# Patient Record
Sex: MALE | Race: WHITE | NOT HISPANIC OR LATINO | URBAN - METROPOLITAN AREA
[De-identification: names, ages, dates, MRNs, and addresses within clinical notes are randomized per-mention and may not be internally consistent; named-entity substitution may affect disease eponyms.]

---

## 2017-10-13 ENCOUNTER — EMERGENCY (EMERGENCY)
Facility: HOSPITAL | Age: 82
LOS: 0 days | Discharge: HOME | End: 2017-10-13
Attending: EMERGENCY MEDICINE | Admitting: EMERGENCY MEDICINE
Payer: MEDICARE

## 2017-10-13 VITALS
DIASTOLIC BLOOD PRESSURE: 62 MMHG | HEART RATE: 89 BPM | OXYGEN SATURATION: 99 % | RESPIRATION RATE: 17 BRPM | SYSTOLIC BLOOD PRESSURE: 171 MMHG | TEMPERATURE: 98 F

## 2017-10-13 VITALS — HEIGHT: 71 IN | WEIGHT: 160.06 LBS

## 2017-10-13 DIAGNOSIS — I10 ESSENTIAL (PRIMARY) HYPERTENSION: ICD-10-CM

## 2017-10-13 DIAGNOSIS — M54.9 DORSALGIA, UNSPECIFIED: ICD-10-CM

## 2017-10-13 DIAGNOSIS — E03.9 HYPOTHYROIDISM, UNSPECIFIED: ICD-10-CM

## 2017-10-13 LAB
ALBUMIN SERPL ELPH-MCNC: 4.1 G/DL — SIGNIFICANT CHANGE UP (ref 3.3–5)
ALP SERPL-CCNC: 52 U/L — SIGNIFICANT CHANGE UP (ref 40–120)
ALT FLD-CCNC: 32 U/L — SIGNIFICANT CHANGE UP (ref 12–78)
ANION GAP SERPL CALC-SCNC: 8 MMOL/L — SIGNIFICANT CHANGE UP (ref 5–17)
ANISOCYTOSIS BLD QL: SLIGHT — SIGNIFICANT CHANGE UP
APTT BLD: 29.4 SEC — SIGNIFICANT CHANGE UP (ref 27.5–37.4)
AST SERPL-CCNC: 34 U/L — SIGNIFICANT CHANGE UP (ref 15–37)
BASO STIPL BLD QL SMEAR: SLIGHT — SIGNIFICANT CHANGE UP
BASOPHILS # BLD AUTO: 0 K/UL — SIGNIFICANT CHANGE UP (ref 0–0.2)
BASOPHILS NFR BLD AUTO: 0.4 % — SIGNIFICANT CHANGE UP (ref 0–2)
BILIRUB SERPL-MCNC: 2.3 MG/DL — HIGH (ref 0.2–1.2)
BUN SERPL-MCNC: 18 MG/DL — SIGNIFICANT CHANGE UP (ref 7–23)
CALCIUM SERPL-MCNC: 9.5 MG/DL — SIGNIFICANT CHANGE UP (ref 8.5–10.1)
CHLORIDE SERPL-SCNC: 102 MMOL/L — SIGNIFICANT CHANGE UP (ref 96–108)
CO2 SERPL-SCNC: 28 MMOL/L — SIGNIFICANT CHANGE UP (ref 22–31)
CREAT SERPL-MCNC: 1.09 MG/DL — SIGNIFICANT CHANGE UP (ref 0.5–1.3)
DACRYOCYTES BLD QL SMEAR: SLIGHT — SIGNIFICANT CHANGE UP
ELLIPTOCYTES BLD QL SMEAR: SLIGHT — SIGNIFICANT CHANGE UP
EOSINOPHIL # BLD AUTO: 0 K/UL — SIGNIFICANT CHANGE UP (ref 0–0.5)
EOSINOPHIL NFR BLD AUTO: 0.4 % — SIGNIFICANT CHANGE UP (ref 0–6)
GLUCOSE SERPL-MCNC: 107 MG/DL — HIGH (ref 70–99)
HCT VFR BLD CALC: 32.2 % — LOW (ref 39–50)
HGB BLD-MCNC: 10.1 G/DL — LOW (ref 13–17)
INR BLD: 1.07 RATIO — SIGNIFICANT CHANGE UP (ref 0.88–1.16)
LYMPHOCYTES # BLD AUTO: 0.9 K/UL — LOW (ref 1–3.3)
LYMPHOCYTES # BLD AUTO: 37.2 % — SIGNIFICANT CHANGE UP (ref 13–44)
MANUAL DIF COMMENT BLD-IMP: SIGNIFICANT CHANGE UP
MCHC RBC-ENTMCNC: 26.1 PG — LOW (ref 27–34)
MCHC RBC-ENTMCNC: 31.3 GM/DL — LOW (ref 32–36)
MCV RBC AUTO: 83.6 FL — SIGNIFICANT CHANGE UP (ref 80–100)
MONOCYTES # BLD AUTO: 0 K/UL — SIGNIFICANT CHANGE UP (ref 0–0.9)
MONOCYTES NFR BLD AUTO: 1.9 % — LOW (ref 2–14)
NEUTROPHILS # BLD AUTO: 1.5 K/UL — LOW (ref 1.8–7.4)
NEUTROPHILS NFR BLD AUTO: 60 % — SIGNIFICANT CHANGE UP (ref 43–77)
PLAT MORPH BLD: NORMAL — SIGNIFICANT CHANGE UP
PLATELET # BLD AUTO: 89 K/UL — LOW (ref 150–400)
POIKILOCYTOSIS BLD QL AUTO: SLIGHT — SIGNIFICANT CHANGE UP
POLYCHROMASIA BLD QL SMEAR: SLIGHT — SIGNIFICANT CHANGE UP
POTASSIUM SERPL-MCNC: 4.1 MMOL/L — SIGNIFICANT CHANGE UP (ref 3.5–5.3)
POTASSIUM SERPL-SCNC: 4.1 MMOL/L — SIGNIFICANT CHANGE UP (ref 3.5–5.3)
PROT SERPL-MCNC: 7.9 GM/DL — SIGNIFICANT CHANGE UP (ref 6–8.3)
PROTHROM AB SERPL-ACNC: 11.6 SEC — SIGNIFICANT CHANGE UP (ref 9.8–12.7)
RBC # BLD: 3.86 M/UL — LOW (ref 4.2–5.8)
RBC # FLD: 16.4 % — HIGH (ref 10.3–14.5)
RBC BLD AUTO: (no result)
SCHISTOCYTES BLD QL AUTO: SLIGHT — SIGNIFICANT CHANGE UP
SODIUM SERPL-SCNC: 138 MMOL/L — SIGNIFICANT CHANGE UP (ref 135–145)
TARGETS BLD QL SMEAR: SLIGHT — SIGNIFICANT CHANGE UP
WBC # BLD: 2.4 K/UL — LOW (ref 3.8–10.5)
WBC # FLD AUTO: 2.4 K/UL — LOW (ref 3.8–10.5)

## 2017-10-13 PROCEDURE — 99284 EMERGENCY DEPT VISIT MOD MDM: CPT

## 2017-10-13 PROCEDURE — 73502 X-RAY EXAM HIP UNI 2-3 VIEWS: CPT | Mod: 26

## 2017-10-13 PROCEDURE — 71010: CPT | Mod: 26

## 2017-10-13 PROCEDURE — 72131 CT LUMBAR SPINE W/O DYE: CPT | Mod: 26

## 2017-10-13 RX ORDER — ONDANSETRON 8 MG/1
4 TABLET, FILM COATED ORAL ONCE
Qty: 0 | Refills: 0 | Status: COMPLETED | OUTPATIENT
Start: 2017-10-13 | End: 2017-10-13

## 2017-10-13 RX ORDER — TRAMADOL HYDROCHLORIDE 50 MG/1
1 TABLET ORAL
Qty: 12 | Refills: 0 | OUTPATIENT
Start: 2017-10-13

## 2017-10-13 RX ORDER — OXYCODONE AND ACETAMINOPHEN 5; 325 MG/1; MG/1
1 TABLET ORAL EVERY 4 HOURS
Qty: 0 | Refills: 0 | Status: DISCONTINUED | OUTPATIENT
Start: 2017-10-13 | End: 2017-10-13

## 2017-10-13 RX ADMIN — ONDANSETRON 4 MILLIGRAM(S): 8 TABLET, FILM COATED ORAL at 13:48

## 2017-10-13 RX ADMIN — OXYCODONE AND ACETAMINOPHEN 1 TABLET(S): 5; 325 TABLET ORAL at 13:47

## 2017-10-13 NOTE — ED PROVIDER NOTE - MEDICAL DECISION MAKING DETAILS
84M pmhx hypothyroidism, HTN presents to the ED s/p slip and fall last night c/o back pain. Plan labs, fluids, x-ray chest, CT lumbar.

## 2017-10-13 NOTE — ED PROVIDER NOTE - OBJECTIVE STATEMENT
84M pmhx hypothyroidism, HTN presents to the ED s/p slip and fall last night c/o back pain. Pt fell backwards last night while walking in his driveway. Denies head trauma or LOC. Pt does not take any anticoagulants or ASA. Pt normally ambulates without any assistance. After the fall, Pt notes some difficulty walking. Pt can stand with some help. Pt has no other complaints and denies SOB, CP, fever/chills, n/v/d and HA. 8/10 in severity. PMD Elyse.
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
## 2017-10-13 NOTE — SBIRT NOTE. - NSSBIRTFULLSCREEN_GEN_A_ED_FT
Meeting with patient attempted however Full Screen Not Performed due to: With another patient and unable to see them.

## 2017-10-13 NOTE — ED ADULT TRIAGE NOTE - CHIEF COMPLAINT QUOTE
slip and fall last night walking across driveway, no loc, no blood thinners, now with lower back pain

## 2017-10-19 ENCOUNTER — EMERGENCY (EMERGENCY)
Facility: HOSPITAL | Age: 82
LOS: 0 days | Discharge: HOME | End: 2017-10-19
Attending: EMERGENCY MEDICINE | Admitting: EMERGENCY MEDICINE
Payer: MEDICARE

## 2017-10-19 VITALS
TEMPERATURE: 98 F | SYSTOLIC BLOOD PRESSURE: 140 MMHG | RESPIRATION RATE: 18 BRPM | OXYGEN SATURATION: 100 % | DIASTOLIC BLOOD PRESSURE: 78 MMHG | HEART RATE: 85 BPM

## 2017-10-19 VITALS — HEIGHT: 71 IN | WEIGHT: 160.06 LBS

## 2017-10-19 DIAGNOSIS — E03.9 HYPOTHYROIDISM, UNSPECIFIED: ICD-10-CM

## 2017-10-19 DIAGNOSIS — K56.41 FECAL IMPACTION: ICD-10-CM

## 2017-10-19 DIAGNOSIS — K56.49 OTHER IMPACTION OF INTESTINE: ICD-10-CM

## 2017-10-19 DIAGNOSIS — I10 ESSENTIAL (PRIMARY) HYPERTENSION: ICD-10-CM

## 2017-10-19 DIAGNOSIS — Z85.6 PERSONAL HISTORY OF LEUKEMIA: ICD-10-CM

## 2017-10-19 PROCEDURE — 99284 EMERGENCY DEPT VISIT MOD MDM: CPT

## 2017-10-19 PROCEDURE — 74000: CPT | Mod: 26

## 2017-10-19 RX ORDER — POLYETHYLENE GLYCOL 3350 17 G/17G
17 POWDER, FOR SOLUTION ORAL
Qty: 200 | Refills: 0
Start: 2017-10-19 | End: 2017-10-26

## 2017-10-19 NOTE — ED PROVIDER NOTE - PROGRESS NOTE DETAILS
ED attending Mastanduono digital disimpaction with hard stool in vault. AJM: pt feeling improved, passed large stool after disimpaction and enema. requesting DC. will give rx for miralax

## 2017-10-19 NOTE — ED ADULT NURSE NOTE - OBJECTIVE STATEMENT
Pt complains of constipation after recent back surgery, reports taking pain medication but unsure of name.  Wife at bedside.  Awaiting x-ray.

## 2017-10-19 NOTE — ED STATDOCS - PROGRESS NOTE DETAILS
83 y/o male with PMHx of HTN, hypothyroidism, hairy cell leukemia presents to the ED c/o rectal pain and inability to go to the bathroom for the past couple days. Had one episiode of fecal impaction about 5 years ago. Pt is currently on thyroid and HTN medication. NKDA, non smoker. Last colonoscopy 4-5 years ago. PMD Dr. Mike Pappas. Will send pt to the Main ED for further evaluation.

## 2017-10-19 NOTE — ED PROVIDER NOTE - OBJECTIVE STATEMENT
83 y/o M with PMHx of HTN, hypothyroidism, hairy cell leukemia presents to the ED c/o rectal pain, constipation for the past x3 days. Pt states that he has had fecal impaction about 5 years ago, currently on thyroid and HTN medication along with pain medication that he received after recent ED visit for trip and fall. Pt currently calm, able to provide adequate hx and denies dysuria, hematuria, NVD, abd pain, CP or any other acute c/o at this time.

## 2017-10-19 NOTE — ED ADULT TRIAGE NOTE - CHIEF COMPLAINT QUOTE
Pt. to the ED C/O Impacted Bowel - Pt. reports abdominal and Rectal pain and states Last BM was yesterday - Hx. of Impacted Bowel

## 2018-07-05 ENCOUNTER — EMERGENCY (EMERGENCY)
Facility: HOSPITAL | Age: 83
LOS: 0 days | Discharge: HOME | End: 2018-07-05
Attending: EMERGENCY MEDICINE | Admitting: EMERGENCY MEDICINE
Payer: MEDICARE

## 2018-07-05 VITALS
SYSTOLIC BLOOD PRESSURE: 116 MMHG | HEART RATE: 98 BPM | HEIGHT: 67 IN | TEMPERATURE: 98 F | OXYGEN SATURATION: 96 % | WEIGHT: 149.91 LBS | DIASTOLIC BLOOD PRESSURE: 56 MMHG | RESPIRATION RATE: 18 BRPM

## 2018-07-05 VITALS
OXYGEN SATURATION: 99 % | RESPIRATION RATE: 18 BRPM | HEART RATE: 90 BPM | TEMPERATURE: 98 F | DIASTOLIC BLOOD PRESSURE: 52 MMHG | SYSTOLIC BLOOD PRESSURE: 128 MMHG

## 2018-07-05 DIAGNOSIS — E03.9 HYPOTHYROIDISM, UNSPECIFIED: ICD-10-CM

## 2018-07-05 DIAGNOSIS — Z90.49 ACQUIRED ABSENCE OF OTHER SPECIFIED PARTS OF DIGESTIVE TRACT: ICD-10-CM

## 2018-07-05 DIAGNOSIS — Z85.6 PERSONAL HISTORY OF LEUKEMIA: ICD-10-CM

## 2018-07-05 DIAGNOSIS — S62.511B DISPLACED FRACTURE OF PROXIMAL PHALANX OF RIGHT THUMB, INITIAL ENCOUNTER FOR OPEN FRACTURE: ICD-10-CM

## 2018-07-05 DIAGNOSIS — M79.641 PAIN IN RIGHT HAND: ICD-10-CM

## 2018-07-05 DIAGNOSIS — Z90.89 ACQUIRED ABSENCE OF OTHER ORGANS: ICD-10-CM

## 2018-07-05 DIAGNOSIS — M50.30 OTHER CERVICAL DISC DEGENERATION, UNSPECIFIED CERVICAL REGION: ICD-10-CM

## 2018-07-05 DIAGNOSIS — I10 ESSENTIAL (PRIMARY) HYPERTENSION: ICD-10-CM
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
## 2018-07-05 LAB
ALBUMIN SERPL ELPH-MCNC: 3.5 G/DL — SIGNIFICANT CHANGE UP (ref 3.3–5)
ALP SERPL-CCNC: 58 U/L — SIGNIFICANT CHANGE UP (ref 40–120)
ALT FLD-CCNC: 16 U/L — SIGNIFICANT CHANGE UP (ref 12–78)
ANION GAP SERPL CALC-SCNC: 10 MMOL/L — SIGNIFICANT CHANGE UP (ref 5–17)
ANISOCYTOSIS BLD QL: SLIGHT — SIGNIFICANT CHANGE UP
AST SERPL-CCNC: 16 U/L — SIGNIFICANT CHANGE UP (ref 15–37)
BASO STIPL BLD QL SMEAR: PRESENT — SIGNIFICANT CHANGE UP
BASOPHILS # BLD AUTO: 0.02 K/UL — SIGNIFICANT CHANGE UP (ref 0–0.2)
BASOPHILS NFR BLD AUTO: 0.5 % — SIGNIFICANT CHANGE UP (ref 0–2)
BILIRUB SERPL-MCNC: 0.8 MG/DL — SIGNIFICANT CHANGE UP (ref 0.2–1.2)
BUN SERPL-MCNC: 14 MG/DL — SIGNIFICANT CHANGE UP (ref 7–23)
CALCIUM SERPL-MCNC: 8.6 MG/DL — SIGNIFICANT CHANGE UP (ref 8.5–10.1)
CHLORIDE SERPL-SCNC: 105 MMOL/L — SIGNIFICANT CHANGE UP (ref 96–108)
CO2 SERPL-SCNC: 26 MMOL/L — SIGNIFICANT CHANGE UP (ref 22–31)
CREAT SERPL-MCNC: 1.09 MG/DL — SIGNIFICANT CHANGE UP (ref 0.5–1.3)
DACRYOCYTES BLD QL SMEAR: SLIGHT — SIGNIFICANT CHANGE UP
ELLIPTOCYTES BLD QL SMEAR: SLIGHT — SIGNIFICANT CHANGE UP
EOSINOPHIL # BLD AUTO: 0.08 K/UL — SIGNIFICANT CHANGE UP (ref 0–0.5)
EOSINOPHIL NFR BLD AUTO: 2.1 % — SIGNIFICANT CHANGE UP (ref 0–6)
GLUCOSE SERPL-MCNC: 87 MG/DL — SIGNIFICANT CHANGE UP (ref 70–99)
HCT VFR BLD CALC: 29 % — LOW (ref 39–50)
HGB BLD-MCNC: 9.9 G/DL — LOW (ref 13–17)
HYPOCHROMIA BLD QL: SIGNIFICANT CHANGE UP
IMM GRANULOCYTES NFR BLD AUTO: 0.8 % — SIGNIFICANT CHANGE UP (ref 0–1.5)
LYMPHOCYTES # BLD AUTO: 0.88 K/UL — LOW (ref 1–3.3)
LYMPHOCYTES # BLD AUTO: 23.4 % — SIGNIFICANT CHANGE UP (ref 13–44)
MANUAL SMEAR VERIFICATION: SIGNIFICANT CHANGE UP
MCHC RBC-ENTMCNC: 23.1 PG — LOW (ref 27–34)
MCHC RBC-ENTMCNC: 34.1 GM/DL — SIGNIFICANT CHANGE UP (ref 32–36)
MCV RBC AUTO: 67.8 FL — LOW (ref 80–100)
MONOCYTES # BLD AUTO: 0.39 K/UL — SIGNIFICANT CHANGE UP (ref 0–0.9)
MONOCYTES NFR BLD AUTO: 10.4 % — SIGNIFICANT CHANGE UP (ref 2–14)
NEUTROPHILS # BLD AUTO: 2.36 K/UL — SIGNIFICANT CHANGE UP (ref 1.8–7.4)
NEUTROPHILS NFR BLD AUTO: 62.8 % — SIGNIFICANT CHANGE UP (ref 43–77)
NRBC # BLD: 0 /100 WBCS — SIGNIFICANT CHANGE UP (ref 0–0)
PLAT MORPH BLD: NORMAL — SIGNIFICANT CHANGE UP
PLATELET # BLD AUTO: 131 K/UL — LOW (ref 150–400)
POIKILOCYTOSIS BLD QL AUTO: SLIGHT — SIGNIFICANT CHANGE UP
POLYCHROMASIA BLD QL SMEAR: SLIGHT — SIGNIFICANT CHANGE UP
POTASSIUM SERPL-MCNC: 3.8 MMOL/L — SIGNIFICANT CHANGE UP (ref 3.5–5.3)
POTASSIUM SERPL-SCNC: 3.8 MMOL/L — SIGNIFICANT CHANGE UP (ref 3.5–5.3)
PROT SERPL-MCNC: 6.6 GM/DL — SIGNIFICANT CHANGE UP (ref 6–8.3)
RBC # BLD: 4.28 M/UL — SIGNIFICANT CHANGE UP (ref 4.2–5.8)
RBC # FLD: 15.8 % — HIGH (ref 10.3–14.5)
RBC BLD AUTO: ABNORMAL
SODIUM SERPL-SCNC: 141 MMOL/L — SIGNIFICANT CHANGE UP (ref 135–145)
WBC # BLD: 3.76 K/UL — LOW (ref 3.8–10.5)
WBC # FLD AUTO: 3.76 K/UL — LOW (ref 3.8–10.5)

## 2018-07-05 PROCEDURE — 70450 CT HEAD/BRAIN W/O DYE: CPT | Mod: 26

## 2018-07-05 PROCEDURE — 99285 EMERGENCY DEPT VISIT HI MDM: CPT | Mod: 25

## 2018-07-05 PROCEDURE — 71045 X-RAY EXAM CHEST 1 VIEW: CPT | Mod: 26

## 2018-07-05 PROCEDURE — 72125 CT NECK SPINE W/O DYE: CPT | Mod: 26

## 2018-07-05 PROCEDURE — 73140 X-RAY EXAM OF FINGER(S): CPT | Mod: 26,RT

## 2018-07-05 PROCEDURE — 93010 ELECTROCARDIOGRAM REPORT: CPT

## 2018-07-05 RX ORDER — TETANUS TOXOID, REDUCED DIPHTHERIA TOXOID AND ACELLULAR PERTUSSIS VACCINE, ADSORBED 5; 2.5; 8; 8; 2.5 [IU]/.5ML; [IU]/.5ML; UG/.5ML; UG/.5ML; UG/.5ML
0.5 SUSPENSION INTRAMUSCULAR ONCE
Qty: 0 | Refills: 0 | Status: COMPLETED | OUTPATIENT
Start: 2018-07-05 | End: 2018-07-05

## 2018-07-05 RX ORDER — SODIUM CHLORIDE 9 MG/ML
1000 INJECTION INTRAMUSCULAR; INTRAVENOUS; SUBCUTANEOUS ONCE
Qty: 0 | Refills: 0 | Status: COMPLETED | OUTPATIENT
Start: 2018-07-05 | End: 2018-07-05

## 2018-07-05 RX ORDER — CEFAZOLIN SODIUM 1 G
2000 VIAL (EA) INJECTION ONCE
Qty: 0 | Refills: 0 | Status: COMPLETED | OUTPATIENT
Start: 2018-07-05 | End: 2018-07-05

## 2018-07-05 RX ORDER — CEPHALEXIN 500 MG
1 CAPSULE ORAL
Qty: 40 | Refills: 0
Start: 2018-07-05 | End: 2018-07-14

## 2018-07-05 RX ADMIN — SODIUM CHLORIDE 1000 MILLILITER(S): 9 INJECTION INTRAMUSCULAR; INTRAVENOUS; SUBCUTANEOUS at 19:55

## 2018-07-05 RX ADMIN — TETANUS TOXOID, REDUCED DIPHTHERIA TOXOID AND ACELLULAR PERTUSSIS VACCINE, ADSORBED 0.5 MILLILITER(S): 5; 2.5; 8; 8; 2.5 SUSPENSION INTRAMUSCULAR at 22:32

## 2018-07-05 RX ADMIN — Medication 100 MILLIGRAM(S): at 19:55

## 2018-07-05 NOTE — ED PROVIDER NOTE - NS_ ATTENDINGSCRIBEDETAILS _ED_A_ED_FT
The scribe's documentation has been prepared under my direction and personally reviewed by me in its entirety. I confirm that the note above accurately reflects all work, treatment, procedures, and medical decision-making performed by me.  Tyshawn Wagner MD

## 2018-07-05 NOTE — ED ADULT NURSE NOTE - OBJECTIVE STATEMENT
pt presents to the ed s/p fall and landed on his R hand, R thumb is wrapped. Pt denies loc / being on blood thinners / sob / cp.
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
## 2018-07-05 NOTE — ED PROVIDER NOTE - SKIN, MLM
right skin tear lower thoracic paraspinal. abrasion to right side scalp. multiple skin tear posterior right arm PIP (3-5)joints right knuckle. right skin tear lower thoracic paraspinal. superficial abrasion to right parietal scalp.  skin tear posterior right PIP (3-5)joints right

## 2018-07-05 NOTE — ED PROVIDER NOTE - PROGRESS NOTE DETAILS
Sharee GREWAL for ED Attending Dr. Wagner: called hand surgery pt feels well, ambulates w/o difficulty, cleared by hand. stable for dc and outpatient f/u

## 2018-07-05 NOTE — ED ADULT NURSE NOTE - CHPI ED SYMPTOMS NEG
no chills/no decreased eating/drinking/no nausea/no numbness/no tingling/no weakness/no fever/no vomiting/no dizziness
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
## 2018-07-05 NOTE — ED ADULT NURSE NOTE - CHIEF COMPLAINT QUOTE
s/p unwitnessed fall.  pt denies hitting head or blood thinners.  pt GCS 15. as per EMS right thumb fx

## 2018-07-05 NOTE — ED PROVIDER NOTE - MEDICAL DECISION MAKING DETAILS
79 y/o male presents to the ED s/p mechanical fall. Open fx to right thumb. will be given abx. Plan for X-ray, CT-Head, Consult hand surgeon.

## 2018-07-08 ENCOUNTER — EMERGENCY (EMERGENCY)
Facility: HOSPITAL | Age: 83
LOS: 0 days | Discharge: HOME | End: 2018-07-08
Attending: EMERGENCY MEDICINE | Admitting: EMERGENCY MEDICINE
Payer: MEDICARE

## 2018-07-08 VITALS
WEIGHT: 149.91 LBS | HEART RATE: 109 BPM | OXYGEN SATURATION: 100 % | DIASTOLIC BLOOD PRESSURE: 60 MMHG | SYSTOLIC BLOOD PRESSURE: 132 MMHG | TEMPERATURE: 97 F | RESPIRATION RATE: 18 BRPM | HEIGHT: 70 IN

## 2018-07-08 DIAGNOSIS — M79.641 PAIN IN RIGHT HAND: ICD-10-CM

## 2018-07-08 DIAGNOSIS — Z85.6 PERSONAL HISTORY OF LEUKEMIA: ICD-10-CM

## 2018-07-08 DIAGNOSIS — I10 ESSENTIAL (PRIMARY) HYPERTENSION: ICD-10-CM

## 2018-07-08 DIAGNOSIS — Z90.89 ACQUIRED ABSENCE OF OTHER ORGANS: ICD-10-CM

## 2018-07-08 DIAGNOSIS — Z90.49 ACQUIRED ABSENCE OF OTHER SPECIFIED PARTS OF DIGESTIVE TRACT: ICD-10-CM

## 2018-07-08 DIAGNOSIS — E03.9 HYPOTHYROIDISM, UNSPECIFIED: ICD-10-CM

## 2018-07-08 PROCEDURE — 73110 X-RAY EXAM OF WRIST: CPT | Mod: 26,RT

## 2018-07-08 PROCEDURE — 99285 EMERGENCY DEPT VISIT HI MDM: CPT

## 2018-07-08 PROCEDURE — 73090 X-RAY EXAM OF FOREARM: CPT | Mod: 26,RT

## 2018-07-08 RX ORDER — OXYCODONE AND ACETAMINOPHEN 5; 325 MG/1; MG/1
1 TABLET ORAL ONCE
Qty: 0 | Refills: 0 | Status: DISCONTINUED | OUTPATIENT
Start: 2018-07-08 | End: 2018-07-08

## 2018-07-08 RX ADMIN — OXYCODONE AND ACETAMINOPHEN 1 TABLET(S): 5; 325 TABLET ORAL at 14:06

## 2018-07-08 NOTE — ED STATDOCS - NS_ ATTENDINGSCRIBEDETAILS _ED_A_ED_FT
I, Brendan Addison MD,  performed the initial face to face bedside interview with this patient regarding history of present illness, review of symptoms and relevant past medical, social and family history.  I completed an independent physical examination.    The history, relevant review of systems, past medical and surgical history, medical decision making, and physical examination was documented by the scribe in my presence and I attest to the accuracy of the documentation.

## 2018-07-08 NOTE — ED STATDOCS - MUSCULOSKELETAL, MLM
+TTP of the R wrist, neurovascular intact distally, cape-refill less than 2 seconds, splint in place. range of motion is not limited and there is no muscle tenderness.

## 2018-07-08 NOTE — ED STATDOCS - PROGRESS NOTE DETAILS
84 yr. old male PMH: Leukemia ,Thyroidectomy, Cholecystectomy presents to ED with 84 yr. old male PMH: Leukemia ,Thyroidectomy, Cholecystectomy presents to ED with worsening pain in right thumb and wrist. . Patient sustained a fracture of thumb and seen and treated by Dr. Segundo in  ED on Thursday. No new injury yet C/O pain. Neurovascular status intact. Currently on antibiotics. Has appointment on Tuesday. Seen and examined by attending in Intake. Plan: X-Ray and pain medication. Will f/U with results and re evaluate. Neli HARPER

## 2018-07-08 NOTE — ED STATDOCS - ATTENDING CONTRIBUTION TO CARE
I, Brendan Addison MD,  performed the initial face to face bedside interview with this patient regarding history of present illness, review of symptoms and relevant past medical, social and family history.  I completed an independent physical examination.  I was the initial provider who evaluated this patient. I have signed out the follow up of any pending tests (i.e. labs, radiological studies) to the ACP.  I have communicated the patient’s plan of care and disposition with the ACP.
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
## 2018-07-08 NOTE — ED ADULT NURSE NOTE - OBJECTIVE STATEMENT
pt c/o right hand and wrist pain.  pt has splint in place from prior finger fx.  reports tylenol is "not cutting it"  fingers cool and mobile

## 2018-10-21 NOTE — ED ADULT NURSE NOTE - NSHISCREENINGQ1_ED_A_ED
I will START or STAY ON the medications listed below when I get home from the hospital:    predniSONE 5 mg oral tablet  -- 1 tab(s) by mouth every other day  -- Indication: For Scleroderma    acetaminophen 325 mg oral tablet  -- 2 tab(s) by mouth every 6 hours, As needed, Temp greater or equal to 38C (100.4F)  -- Indication: For fever     Plaquenil 200 mg oral tablet  -- 1 tab(s) by mouth once a day  -- Indication: For Scleroderma    cefpodoxime 100 mg oral tablet  -- 1 tab(s) by mouth every 12 hours x 10 days   -- Finish all this medication unless otherwise directed by prescriber.  Take with food or milk.    -- Indication: For uti    levothyroxine 88 mcg (0.088 mg) oral tablet  -- 1 tab(s) by mouth once a day  -- Indication: For Hypothyroidism    Vitamin D3 2000 intl units oral capsule  -- 1 cap(s) by mouth once a day  -- Indication: For Supplement
No

## 2019-05-05 ENCOUNTER — INPATIENT (INPATIENT)
Facility: HOSPITAL | Age: 84
LOS: 3 days | Discharge: NURSING, REHAB OR LONG-TERM CARE | End: 2019-05-09
Attending: SURGERY | Admitting: SURGERY
Payer: MEDICARE

## 2019-05-05 VITALS
DIASTOLIC BLOOD PRESSURE: 52 MMHG | WEIGHT: 139.99 LBS | SYSTOLIC BLOOD PRESSURE: 140 MMHG | OXYGEN SATURATION: 100 % | HEART RATE: 75 BPM | TEMPERATURE: 98 F | HEIGHT: 67 IN | RESPIRATION RATE: 20 BRPM

## 2019-05-05 LAB
ALBUMIN SERPL ELPH-MCNC: 3.9 G/DL — SIGNIFICANT CHANGE UP (ref 3.3–5)
ALP SERPL-CCNC: 53 U/L — SIGNIFICANT CHANGE UP (ref 40–120)
ALT FLD-CCNC: 24 U/L — SIGNIFICANT CHANGE UP (ref 12–78)
ANION GAP SERPL CALC-SCNC: 4 MMOL/L — LOW (ref 5–17)
ANISOCYTOSIS BLD QL: SIGNIFICANT CHANGE UP
APTT BLD: 29.8 SEC — SIGNIFICANT CHANGE UP (ref 27.5–36.3)
AST SERPL-CCNC: 24 U/L — SIGNIFICANT CHANGE UP (ref 15–37)
BASO STIPL BLD QL SMEAR: PRESENT — SIGNIFICANT CHANGE UP
BASOPHILS # BLD AUTO: 0.02 K/UL — SIGNIFICANT CHANGE UP (ref 0–0.2)
BASOPHILS NFR BLD AUTO: 0.6 % — SIGNIFICANT CHANGE UP (ref 0–2)
BILIRUB SERPL-MCNC: 1.1 MG/DL — SIGNIFICANT CHANGE UP (ref 0.2–1.2)
BUN SERPL-MCNC: 9 MG/DL — SIGNIFICANT CHANGE UP (ref 7–23)
CALCIUM SERPL-MCNC: 9.1 MG/DL — SIGNIFICANT CHANGE UP (ref 8.5–10.1)
CHLORIDE SERPL-SCNC: 107 MMOL/L — SIGNIFICANT CHANGE UP (ref 96–108)
CO2 SERPL-SCNC: 29 MMOL/L — SIGNIFICANT CHANGE UP (ref 22–31)
CREAT SERPL-MCNC: 0.94 MG/DL — SIGNIFICANT CHANGE UP (ref 0.5–1.3)
DACRYOCYTES BLD QL SMEAR: SLIGHT — SIGNIFICANT CHANGE UP
EOSINOPHIL # BLD AUTO: 0.04 K/UL — SIGNIFICANT CHANGE UP (ref 0–0.5)
EOSINOPHIL NFR BLD AUTO: 1.2 % — SIGNIFICANT CHANGE UP (ref 0–6)
ETHANOL SERPL-MCNC: 218 MG/DL — HIGH (ref 0–10)
GLUCOSE SERPL-MCNC: 104 MG/DL — HIGH (ref 70–99)
HCT VFR BLD CALC: 34.2 % — LOW (ref 39–50)
HGB BLD-MCNC: 11.4 G/DL — LOW (ref 13–17)
HYPOCHROMIA BLD QL: SLIGHT — SIGNIFICANT CHANGE UP
IMM GRANULOCYTES NFR BLD AUTO: 1.2 % — SIGNIFICANT CHANGE UP (ref 0–1.5)
INR BLD: 0.97 RATIO — SIGNIFICANT CHANGE UP (ref 0.88–1.16)
LYMPHOCYTES # BLD AUTO: 0.9 K/UL — LOW (ref 1–3.3)
LYMPHOCYTES # BLD AUTO: 26.1 % — SIGNIFICANT CHANGE UP (ref 13–44)
MACROCYTES BLD QL: SLIGHT — SIGNIFICANT CHANGE UP
MANUAL SMEAR VERIFICATION: SIGNIFICANT CHANGE UP
MCHC RBC-ENTMCNC: 23 PG — LOW (ref 27–34)
MCHC RBC-ENTMCNC: 33.3 GM/DL — SIGNIFICANT CHANGE UP (ref 32–36)
MCV RBC AUTO: 69.1 FL — LOW (ref 80–100)
MICROCYTES BLD QL: SLIGHT — SIGNIFICANT CHANGE UP
MONOCYTES # BLD AUTO: 0.32 K/UL — SIGNIFICANT CHANGE UP (ref 0–0.9)
MONOCYTES NFR BLD AUTO: 9.3 % — SIGNIFICANT CHANGE UP (ref 2–14)
NEUTROPHILS # BLD AUTO: 2.13 K/UL — SIGNIFICANT CHANGE UP (ref 1.8–7.4)
NEUTROPHILS NFR BLD AUTO: 61.6 % — SIGNIFICANT CHANGE UP (ref 43–77)
NRBC # BLD: 0 /100 WBCS — SIGNIFICANT CHANGE UP (ref 0–0)
OVALOCYTES BLD QL SMEAR: SIGNIFICANT CHANGE UP
PLAT MORPH BLD: NORMAL — SIGNIFICANT CHANGE UP
PLATELET # BLD AUTO: 107 K/UL — LOW (ref 150–400)
POIKILOCYTOSIS BLD QL AUTO: SLIGHT — SIGNIFICANT CHANGE UP
POLYCHROMASIA BLD QL SMEAR: SLIGHT — SIGNIFICANT CHANGE UP
POTASSIUM SERPL-MCNC: 4.2 MMOL/L — SIGNIFICANT CHANGE UP (ref 3.5–5.3)
POTASSIUM SERPL-SCNC: 4.2 MMOL/L — SIGNIFICANT CHANGE UP (ref 3.5–5.3)
PROT SERPL-MCNC: 7.2 GM/DL — SIGNIFICANT CHANGE UP (ref 6–8.3)
PROTHROM AB SERPL-ACNC: 10.8 SEC — SIGNIFICANT CHANGE UP (ref 10–12.9)
RBC # BLD: 4.95 M/UL — SIGNIFICANT CHANGE UP (ref 4.2–5.8)
RBC # FLD: 16.9 % — HIGH (ref 10.3–14.5)
RBC BLD AUTO: ABNORMAL
SODIUM SERPL-SCNC: 140 MMOL/L — SIGNIFICANT CHANGE UP (ref 135–145)
TARGETS BLD QL SMEAR: SIGNIFICANT CHANGE UP
WBC # BLD: 3.45 K/UL — LOW (ref 3.8–10.5)
WBC # FLD AUTO: 3.45 K/UL — LOW (ref 3.8–10.5)

## 2019-05-05 PROCEDURE — 70450 CT HEAD/BRAIN W/O DYE: CPT | Mod: 26

## 2019-05-05 PROCEDURE — 72170 X-RAY EXAM OF PELVIS: CPT | Mod: 26

## 2019-05-05 PROCEDURE — 99285 EMERGENCY DEPT VISIT HI MDM: CPT | Mod: 25

## 2019-05-05 PROCEDURE — 72125 CT NECK SPINE W/O DYE: CPT | Mod: 26

## 2019-05-05 PROCEDURE — 71260 CT THORAX DX C+: CPT | Mod: 26

## 2019-05-05 PROCEDURE — 71045 X-RAY EXAM CHEST 1 VIEW: CPT | Mod: 26

## 2019-05-05 PROCEDURE — 93010 ELECTROCARDIOGRAM REPORT: CPT

## 2019-05-05 PROCEDURE — 74177 CT ABD & PELVIS W/CONTRAST: CPT | Mod: 26

## 2019-05-05 PROCEDURE — 99053 MED SERV 10PM-8AM 24 HR FAC: CPT
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
## 2019-05-05 NOTE — ED ADULT NURSE NOTE - NSIMPLEMENTINTERV_GEN_ALL_ED
Implemented All Fall Risk Interventions:  [place] to call system. Call bell, personal items and telephone within reach. Instruct patient to call for assistance. Room bathroom lighting operational. Non-slip footwear when patient is off stretcher. Physically safe environment: no spills, clutter or unnecessary equipment. Stretcher in lowest position, wheels locked, appropriate side rails in place. Provide visual cue, wrist band, yellow gown, etc. Monitor gait and stability. Monitor for mental status changes and reorient to person, place, and time. Review medications for side effects contributing to fall risk. Reinforce activity limits and safety measures with patient and family.
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
## 2019-05-06 LAB
ACANTHOCYTES BLD QL SMEAR: SLIGHT — SIGNIFICANT CHANGE UP
ANION GAP SERPL CALC-SCNC: 7 MMOL/L — SIGNIFICANT CHANGE UP (ref 5–17)
ANISOCYTOSIS BLD QL: SIGNIFICANT CHANGE UP
APPEARANCE UR: ABNORMAL
BACTERIA # UR AUTO: ABNORMAL
BASOPHILS # BLD AUTO: 0.02 K/UL — SIGNIFICANT CHANGE UP (ref 0–0.2)
BASOPHILS NFR BLD AUTO: 0.3 % — SIGNIFICANT CHANGE UP (ref 0–2)
BILIRUB UR-MCNC: NEGATIVE — SIGNIFICANT CHANGE UP
BUN SERPL-MCNC: 8 MG/DL — SIGNIFICANT CHANGE UP (ref 7–23)
CALCIUM SERPL-MCNC: 8.7 MG/DL — SIGNIFICANT CHANGE UP (ref 8.5–10.1)
CHLORIDE SERPL-SCNC: 107 MMOL/L — SIGNIFICANT CHANGE UP (ref 96–108)
CO2 SERPL-SCNC: 26 MMOL/L — SIGNIFICANT CHANGE UP (ref 22–31)
COLOR SPEC: ABNORMAL
COMMENT - URINE: SIGNIFICANT CHANGE UP
CREAT SERPL-MCNC: 0.78 MG/DL — SIGNIFICANT CHANGE UP (ref 0.5–1.3)
DACRYOCYTES BLD QL SMEAR: SLIGHT — SIGNIFICANT CHANGE UP
DIFF PNL FLD: ABNORMAL
ELLIPTOCYTES BLD QL SMEAR: SLIGHT — SIGNIFICANT CHANGE UP
EOSINOPHIL # BLD AUTO: 0.04 K/UL — SIGNIFICANT CHANGE UP (ref 0–0.5)
EOSINOPHIL NFR BLD AUTO: 0.6 % — SIGNIFICANT CHANGE UP (ref 0–6)
GLUCOSE SERPL-MCNC: 75 MG/DL — SIGNIFICANT CHANGE UP (ref 70–99)
GLUCOSE UR QL: NEGATIVE MG/DL — SIGNIFICANT CHANGE UP
HCT VFR BLD CALC: 35.1 % — LOW (ref 39–50)
HGB BLD-MCNC: 11.9 G/DL — LOW (ref 13–17)
IMM GRANULOCYTES NFR BLD AUTO: 1.4 % — SIGNIFICANT CHANGE UP (ref 0–1.5)
KETONES UR-MCNC: ABNORMAL
LEUKOCYTE ESTERASE UR-ACNC: ABNORMAL
LYMPHOCYTES # BLD AUTO: 0.84 K/UL — LOW (ref 1–3.3)
LYMPHOCYTES # BLD AUTO: 13.2 % — SIGNIFICANT CHANGE UP (ref 13–44)
MANUAL SMEAR VERIFICATION: SIGNIFICANT CHANGE UP
MCHC RBC-ENTMCNC: 23.2 PG — LOW (ref 27–34)
MCHC RBC-ENTMCNC: 33.9 GM/DL — SIGNIFICANT CHANGE UP (ref 32–36)
MCV RBC AUTO: 68.3 FL — LOW (ref 80–100)
MONOCYTES # BLD AUTO: 0.44 K/UL — SIGNIFICANT CHANGE UP (ref 0–0.9)
MONOCYTES NFR BLD AUTO: 6.9 % — SIGNIFICANT CHANGE UP (ref 2–14)
NEUTROPHILS # BLD AUTO: 4.93 K/UL — SIGNIFICANT CHANGE UP (ref 1.8–7.4)
NEUTROPHILS NFR BLD AUTO: 77.6 % — HIGH (ref 43–77)
NITRITE UR-MCNC: NEGATIVE — SIGNIFICANT CHANGE UP
NRBC # BLD: 0 /100 WBCS — SIGNIFICANT CHANGE UP (ref 0–0)
OVALOCYTES BLD QL SMEAR: SLIGHT — SIGNIFICANT CHANGE UP
PH UR: 7 — SIGNIFICANT CHANGE UP (ref 5–8)
PLAT MORPH BLD: NORMAL — SIGNIFICANT CHANGE UP
PLATELET # BLD AUTO: 106 K/UL — LOW (ref 150–400)
POIKILOCYTOSIS BLD QL AUTO: SIGNIFICANT CHANGE UP
POTASSIUM SERPL-MCNC: 3.9 MMOL/L — SIGNIFICANT CHANGE UP (ref 3.5–5.3)
POTASSIUM SERPL-SCNC: 3.9 MMOL/L — SIGNIFICANT CHANGE UP (ref 3.5–5.3)
PROT UR-MCNC: 30 MG/DL
RBC # BLD: 5.14 M/UL — SIGNIFICANT CHANGE UP (ref 4.2–5.8)
RBC # FLD: 17.2 % — HIGH (ref 10.3–14.5)
RBC BLD AUTO: ABNORMAL
RBC CASTS # UR COMP ASSIST: >50 /HPF (ref 0–4)
SCHISTOCYTES BLD QL AUTO: SLIGHT — SIGNIFICANT CHANGE UP
SODIUM SERPL-SCNC: 140 MMOL/L — SIGNIFICANT CHANGE UP (ref 135–145)
SP GR SPEC: 1.01 — SIGNIFICANT CHANGE UP (ref 1.01–1.02)
TARGETS BLD QL SMEAR: SIGNIFICANT CHANGE UP
UROBILINOGEN FLD QL: 1 MG/DL
WBC # BLD: 6.36 K/UL — SIGNIFICANT CHANGE UP (ref 3.8–10.5)
WBC # FLD AUTO: 6.36 K/UL — SIGNIFICANT CHANGE UP (ref 3.8–10.5)
WBC UR QL: SIGNIFICANT CHANGE UP

## 2019-05-06 PROCEDURE — 72141 MRI NECK SPINE W/O DYE: CPT | Mod: 26

## 2019-05-06 PROCEDURE — 93010 ELECTROCARDIOGRAM REPORT: CPT

## 2019-05-06 PROCEDURE — 99232 SBSQ HOSP IP/OBS MODERATE 35: CPT

## 2019-05-06 RX ORDER — LOSARTAN POTASSIUM 100 MG/1
100 TABLET, FILM COATED ORAL DAILY
Qty: 0 | Refills: 0 | Status: DISCONTINUED | OUTPATIENT
Start: 2019-05-06 | End: 2019-05-09

## 2019-05-06 RX ORDER — DOCUSATE SODIUM 100 MG
100 CAPSULE ORAL THREE TIMES A DAY
Qty: 0 | Refills: 0 | Status: DISCONTINUED | OUTPATIENT
Start: 2019-05-06 | End: 2019-05-09

## 2019-05-06 RX ORDER — ACETAMINOPHEN 500 MG
650 TABLET ORAL EVERY 4 HOURS
Qty: 0 | Refills: 0 | Status: DISCONTINUED | OUTPATIENT
Start: 2019-05-06 | End: 2019-05-09

## 2019-05-06 RX ORDER — LEVOTHYROXINE SODIUM 125 MCG
175 TABLET ORAL DAILY
Qty: 0 | Refills: 0 | Status: DISCONTINUED | OUTPATIENT
Start: 2019-05-06 | End: 2019-05-09

## 2019-05-06 RX ORDER — TAMSULOSIN HYDROCHLORIDE 0.4 MG/1
0.4 CAPSULE ORAL AT BEDTIME
Qty: 0 | Refills: 0 | Status: DISCONTINUED | OUTPATIENT
Start: 2019-05-06 | End: 2019-05-09

## 2019-05-06 RX ORDER — ONDANSETRON 8 MG/1
4 TABLET, FILM COATED ORAL EVERY 6 HOURS
Qty: 0 | Refills: 0 | Status: DISCONTINUED | OUTPATIENT
Start: 2019-05-06 | End: 2019-05-09

## 2019-05-06 RX ORDER — PANTOPRAZOLE SODIUM 20 MG/1
40 TABLET, DELAYED RELEASE ORAL DAILY
Qty: 0 | Refills: 0 | Status: COMPLETED | OUTPATIENT
Start: 2019-05-06 | End: 2019-05-07

## 2019-05-06 RX ORDER — SODIUM CHLORIDE 9 MG/ML
1000 INJECTION, SOLUTION INTRAVENOUS
Qty: 0 | Refills: 0 | Status: DISCONTINUED | OUTPATIENT
Start: 2019-05-06 | End: 2019-05-09

## 2019-05-06 RX ORDER — SENNA PLUS 8.6 MG/1
2 TABLET ORAL AT BEDTIME
Qty: 0 | Refills: 0 | Status: DISCONTINUED | OUTPATIENT
Start: 2019-05-06 | End: 2019-05-09

## 2019-05-06 RX ADMIN — SODIUM CHLORIDE 75 MILLILITER(S): 9 INJECTION, SOLUTION INTRAVENOUS at 22:32

## 2019-05-06 RX ADMIN — Medication 650 MILLIGRAM(S): at 22:23

## 2019-05-06 RX ADMIN — TAMSULOSIN HYDROCHLORIDE 0.4 MILLIGRAM(S): 0.4 CAPSULE ORAL at 22:21

## 2019-05-06 RX ADMIN — Medication 650 MILLIGRAM(S): at 23:19

## 2019-05-06 RX ADMIN — PANTOPRAZOLE SODIUM 40 MILLIGRAM(S): 20 TABLET, DELAYED RELEASE ORAL at 14:14

## 2019-05-06 RX ADMIN — Medication 100 MILLIGRAM(S): at 22:21

## 2019-05-06 RX ADMIN — LOSARTAN POTASSIUM 100 MILLIGRAM(S): 100 TABLET, FILM COATED ORAL at 16:45

## 2019-05-06 RX ADMIN — SODIUM CHLORIDE 75 MILLILITER(S): 9 INJECTION, SOLUTION INTRAVENOUS at 06:14

## 2019-05-06 RX ADMIN — SODIUM CHLORIDE 75 MILLILITER(S): 9 INJECTION, SOLUTION INTRAVENOUS at 02:42

## 2019-05-06 NOTE — H&P ADULT - NSHPREVIEWOFSYSTEMS_GEN_ALL_CORE
ROS:.  [] A ten-point review of systems was otherwise negative except as noted.  Systemic:	[ ] Fever	[ ] Chills	[ ] Night sweats    [ ] Fatigue	[ ] Other  [] Cardiovascular:  [] Pulmonary:  [] Renal/Urologic:  [] Gastrointestinal: abdominal pain, vomiting  [] Metabolic:  [] Neurologic:  [] Hematologic:  [] ENT:  [] Ophthalmologic:  [] Musculoskeletal:    [ X Due to altered mental status/intubation, subjective information were not able to be obtained from the patient. History was obtained, to the extent possible, from review of the chart and collateral sources of information.
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
## 2019-05-06 NOTE — ED ADULT NURSE REASSESSMENT NOTE - NEURO WDL
Alert and oriented to person, place and time, memory intact, behavior appropriate to situation, PERRL. Alert and oriented to person, place and time, memory intact, behavior appropriate to situation, PERRL- L eye artifical
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
## 2019-05-06 NOTE — H&P ADULT - NSHPPHYSICALEXAM_GEN_ALL_CORE
Vital Signs Last 24 Hrs  T(C): 36.7 (05 May 2019 23:00), Max: 36.7 (05 May 2019 21:19)  T(F): 98 (05 May 2019 23:00), Max: 98 (05 May 2019 21:19)  HR: 80 (05 May 2019 23:00) (75 - 80)  BP: 150/68 (05 May 2019 23:00) (140/52 - 150/68)  BP(mean): --  RR: 20 (05 May 2019 23:00) (20 - 20)  SpO2: 100% (05 May 2019 23:00) (100% - 100%)    PHYSICAL EXAM:  Constitutional: NAD, GCS: 14/15, emaciated  AOX2  Eyes:  WNL  ENMT:  cervical collar in place  Neck:  WNL, non tender  Back: Non tender  Respiratory: CTABL  Cardiovascular:  S1+S2+0  Gastrointestinal: Soft, ND , NT  Genitourinary:  WNL  Extremities: NV intact, multiple old bruises B/l hands, forearm.  Vascular:  Intact  Neurological: No focal neurological deficit,  CN, motor and sensory system grossly intact.  Skin: WNL  Musculoskeletal: WNL  Psychiatric: Grossly WNL
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
## 2019-05-06 NOTE — CONSULT NOTE ADULT - ASSESSMENT
1. Hematuria  2. Recurrent UTIs  3. LUTS  4. Renal cysts    Plan:    -UA/urine culture to r/o UTI as possible etiology of hematuria  -No further work-up at this time. Will need to follow up as outpatient for further evaluation/management  -flomax 0.4mg daily  -finasteride 5mg daily  -no further workup or intervention for renal cysts indicated at this time      Thank you for allowing me to assist in the care of this patient  Please feel free to call with any questions/concerns    Curtis Castaneda MD  [facility]  W: [phone]
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
## 2019-05-07 LAB
CULTURE RESULTS: NO GROWTH — SIGNIFICANT CHANGE UP
SPECIMEN SOURCE: SIGNIFICANT CHANGE UP

## 2019-05-07 PROCEDURE — 93010 ELECTROCARDIOGRAM REPORT: CPT

## 2019-05-07 RX ORDER — PANTOPRAZOLE SODIUM 20 MG/1
40 TABLET, DELAYED RELEASE ORAL
Qty: 0 | Refills: 0 | Status: DISCONTINUED | OUTPATIENT
Start: 2019-05-08 | End: 2019-05-09

## 2019-05-07 RX ORDER — TUBERCULIN PURIFIED PROTEIN DERIVATIVE 5 [IU]/.1ML
5 INJECTION, SOLUTION INTRADERMAL ONCE
Qty: 0 | Refills: 0 | Status: DISCONTINUED | OUTPATIENT
Start: 2019-05-07 | End: 2019-05-09

## 2019-05-07 RX ORDER — HEPARIN SODIUM 5000 [USP'U]/ML
5000 INJECTION INTRAVENOUS; SUBCUTANEOUS EVERY 12 HOURS
Qty: 0 | Refills: 0 | Status: DISCONTINUED | OUTPATIENT
Start: 2019-05-07 | End: 2019-05-09

## 2019-05-07 RX ADMIN — PANTOPRAZOLE SODIUM 40 MILLIGRAM(S): 20 TABLET, DELAYED RELEASE ORAL at 13:08

## 2019-05-07 RX ADMIN — Medication 100 MILLIGRAM(S): at 22:44

## 2019-05-07 RX ADMIN — Medication 100 MILLIGRAM(S): at 13:08

## 2019-05-07 RX ADMIN — Medication 175 MICROGRAM(S): at 05:12

## 2019-05-07 RX ADMIN — Medication 650 MILLIGRAM(S): at 22:44

## 2019-05-07 RX ADMIN — Medication 100 MILLIGRAM(S): at 05:12

## 2019-05-07 RX ADMIN — Medication 650 MILLIGRAM(S): at 23:14

## 2019-05-07 RX ADMIN — HEPARIN SODIUM 5000 UNIT(S): 5000 INJECTION INTRAVENOUS; SUBCUTANEOUS at 17:35

## 2019-05-07 RX ADMIN — LOSARTAN POTASSIUM 100 MILLIGRAM(S): 100 TABLET, FILM COATED ORAL at 05:12
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
## 2019-05-07 NOTE — PROGRESS NOTE ADULT - SUBJECTIVE AND OBJECTIVE BOX
Patient seen   Chart reviewed    MRI and CT scan reviewed    Patient is asymptomatic  ROM is stiff, but painless    The CTscan "fx", is a non-displaced line through anterior osteophyte at C5. There is autofusion at C5-6-7, bridging osteophyte at C4-5  No retropulsion or compression    MRI is NOT impressive for edema that you would expect with a fracture  "hyperintensity" may represent a vascular channel.    Given the patient's stable MRI and CT scan and no complaints of pain, I would D/C the collar  Follow along clinically    PAVEL Xiao MD

## 2019-05-08 RX ADMIN — SODIUM CHLORIDE 75 MILLILITER(S): 9 INJECTION, SOLUTION INTRAVENOUS at 17:51

## 2019-05-08 RX ADMIN — SODIUM CHLORIDE 75 MILLILITER(S): 9 INJECTION, SOLUTION INTRAVENOUS at 05:36

## 2019-05-08 RX ADMIN — Medication 100 MILLIGRAM(S): at 14:26

## 2019-05-08 RX ADMIN — PANTOPRAZOLE SODIUM 40 MILLIGRAM(S): 20 TABLET, DELAYED RELEASE ORAL at 05:37

## 2019-05-08 RX ADMIN — LOSARTAN POTASSIUM 100 MILLIGRAM(S): 100 TABLET, FILM COATED ORAL at 05:37

## 2019-05-08 RX ADMIN — HEPARIN SODIUM 5000 UNIT(S): 5000 INJECTION INTRAVENOUS; SUBCUTANEOUS at 05:36

## 2019-05-08 RX ADMIN — TAMSULOSIN HYDROCHLORIDE 0.4 MILLIGRAM(S): 0.4 CAPSULE ORAL at 00:25

## 2019-05-08 RX ADMIN — Medication 100 MILLIGRAM(S): at 21:42

## 2019-05-08 RX ADMIN — TAMSULOSIN HYDROCHLORIDE 0.4 MILLIGRAM(S): 0.4 CAPSULE ORAL at 21:42

## 2019-05-08 RX ADMIN — Medication 175 MICROGRAM(S): at 05:38

## 2019-05-08 RX ADMIN — HEPARIN SODIUM 5000 UNIT(S): 5000 INJECTION INTRAVENOUS; SUBCUTANEOUS at 17:53

## 2019-05-08 RX ADMIN — Medication 100 MILLIGRAM(S): at 05:38

## 2019-05-08 NOTE — PROVIDER CONTACT NOTE (OTHER) - SITUATION
called Dr Rey service spoke to  Jaja
Left message with service to please return Dr. Hines's call.
Left message with service to please return Dr. Hines's call.
Spoke with  to inform him of consult
hematuria    left message with ans service
left message for Dr. Pappas

## 2019-05-08 NOTE — PROGRESS NOTE ADULT - SUBJECTIVE AND OBJECTIVE BOX
[facility]                                                           Orthopedic Spine Progress Note      SUBJECTIVE: Patient seen and examined, c/o some neck soreness this morning    Pain (0-10):   Current Pain Management:  [ ] PCA   [x] Po Analgesics [ ] IM /IV Anagesics     Vital Signs Last 24 Hrs  T(C): 37 (08 May 2019 05:25), Max: 37 (08 May 2019 05:25)  T(F): 98.6 (08 May 2019 05:25), Max: 98.6 (08 May 2019 05:25)  HR: 81 (08 May 2019 05:25) (52 - 102)  BP: 151/61 (08 May 2019 05:25) (109/45 - 151/61)  BP(mean): 61 (07 May 2019 14:00) (61 - 63)  RR: 18 (08 May 2019 05:25) (18 - 24)  SpO2: 100% (08 May 2019 05:25) (100% - 100%)  I&O's Detail    07 May 2019 07:01  -  08 May 2019 07:00  --------------------------------------------------------  IN:    sodium chloride 0.9%: 1000 mL  Total IN: 1000 mL    OUT:    Incontinent per Condom Catheter: 600 mL  Total OUT: 600 mL    Total NET: 400 mL          OBJECTIVE:     Cervical ROM: wnl  Lumbar ROM: not tested  Neurological: A/O x 3              Sensation: [x] intact to light touch  [ ] decreased:          Motor exam: [x]          [x] Upper extremity    Delt      Bicp       Tri      Wrist ext  Wrst Flex       Digit Ext Digit Flex                               R         5/5       5/5       5/5       5/5           5/5           5/5       5/5          5/5                               L          5/5       5/5       5/5       5/5           5/5           5/5       5/5          5/5         [x] Lower ext.     Hip Flx   Quad   Hamstrg   TA       EHL      GS                          R        5/5       5/5       5/5        5/5       5/5      5/5                                 L         5/5       5/5       5/5        5/5       5/5      5/5                                                                [x] Vascular: intact           Tension Signs: none          Long Tract Findings: none           Urinalysis Basic - ( 06 May 2019 13:09 )    Color: Red / Appearance: bloody / S.010 / pH: x  Gluc: x / Ketone: Moderate  / Bili: Negative / Urobili: 1 mg/dL   Blood: x / Protein: 30 mg/dL / Nitrite: Negative   Leuk Esterase: Small / RBC: >50 /HPF / WBC 3-5   Sq Epi: x / Non Sq Epi: x / Bacteria: Occasional
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
## 2019-05-08 NOTE — DIETITIAN INITIAL EVALUATION ADULT. - PHYSICAL APPEARANCE
BMI 21.9. NFPE performed and reveals severe muscle wasting in temples, clavicles, deltoids, severe fat wasting in orbitals, triceps and ribs./underweight
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
## 2019-05-08 NOTE — PROVIDER CONTACT NOTE (OTHER) - DATE AND TIME:
08-May-2019 11:02
06-May-2019 00:48
06-May-2019 01:00
06-May-2019 05:41
06-May-2019 13:58
07-May-2019
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
## 2019-05-08 NOTE — CHART NOTE - NSCHARTNOTEFT_GEN_A_CORE
Upon Nutritional Assessment by the Registered Dietitian your patient was determined to meet criteria / has evidence of the following diagnosis/diagnoses:          [ ]  Mild Protein Calorie Malnutrition        [ ]  Moderate Protein Calorie Malnutrition        [x] Severe Protein Calorie Malnutrition        [ ] Unspecified Protein Calorie Malnutrition        [ ] Underweight / BMI <19        [ ] Morbid Obesity / BMI > 40      Findings as based on:  •  Comprehensive nutrition assessment and consultation  •  Calorie counts (nutrient intake analysis)  •  Food acceptance and intake status from observations by staff  •  Follow up  •  Patient education  •  Intervention secondary to interdisciplinary rounds  •   concerns    Pt meet criteria for severe protein-energy malnutrition in the context of chronic illness r/t leukemia AEB severe muscle/fat wasting, 6/6% wt loss in 2-3 months, increased needs for wound healing/skin integrity.    Treatment:    The following diet has been recommended:   1) Encourage PO intake >80% to meet ENN, Provide Ensure Enlive TID to optimize intake between meals.   2) Monitor PO intake and document in flowsheet to track trends.   3) Monitor labs/electroltes. 4) Weekly wts.       PROVIDER Section:     By signing this assessment you are acknowledging and agree with the diagnosis/diagnoses assigned by the Registered Dietitian    Comments:

## 2019-05-08 NOTE — DIETITIAN INITIAL EVALUATION ADULT. - PERTINENT MEDS FT
MEDICATIONS  (STANDING):  docusate sodium 100 milliGRAM(s) Oral three times a day  heparin  Injectable 5000 Unit(s) SubCutaneous every 12 hours  levothyroxine 175 MICROGram(s) Oral daily  losartan 100 milliGRAM(s) Oral daily  pantoprazole    Tablet 40 milliGRAM(s) Oral before breakfast  PPD  5 Tuberculin Unit(s) Injectable 5 Unit(s) IntraDermal once  sodium chloride 0.9% 1000 milliLiter(s) (75 mL/Hr) IV Continuous <Continuous>  tamsulosin 0.4 milliGRAM(s) Oral at bedtime    MEDICATIONS  (PRN):  acetaminophen   Tablet .. 650 milliGRAM(s) Oral every 4 hours PRN Temp greater or equal to 38C (100.4F), Mild Pain (1 - 3)  ondansetron Injectable 4 milliGRAM(s) IV Push every 6 hours PRN Nausea  senna 2 Tablet(s) Oral at bedtime PRN Constipation

## 2019-05-09 ENCOUNTER — TRANSCRIPTION ENCOUNTER (OUTPATIENT)
Age: 84
End: 2019-05-09

## 2019-05-09 VITALS
HEART RATE: 96 BPM | OXYGEN SATURATION: 100 % | RESPIRATION RATE: 16 BRPM | DIASTOLIC BLOOD PRESSURE: 78 MMHG | SYSTOLIC BLOOD PRESSURE: 138 MMHG | TEMPERATURE: 97 F

## 2019-05-09 PROCEDURE — 99239 HOSP IP/OBS DSCHRG MGMT >30: CPT

## 2019-05-09 RX ORDER — PANTOPRAZOLE SODIUM 20 MG/1
1 TABLET, DELAYED RELEASE ORAL
Qty: 0 | Refills: 0 | DISCHARGE
Start: 2019-05-09

## 2019-05-09 RX ORDER — LEVOTHYROXINE SODIUM 125 MCG
1 TABLET ORAL
Qty: 0 | Refills: 0 | DISCHARGE

## 2019-05-09 RX ORDER — LEVOTHYROXINE SODIUM 125 MCG
1 TABLET ORAL
Qty: 0 | Refills: 0 | DISCHARGE
Start: 2019-05-09

## 2019-05-09 RX ORDER — LOSARTAN POTASSIUM 100 MG/1
1 TABLET, FILM COATED ORAL
Qty: 0 | Refills: 0 | DISCHARGE

## 2019-05-09 RX ORDER — TAMSULOSIN HYDROCHLORIDE 0.4 MG/1
1 CAPSULE ORAL
Qty: 0 | Refills: 0 | DISCHARGE

## 2019-05-09 RX ORDER — DOCUSATE SODIUM 100 MG
1 CAPSULE ORAL
Qty: 0 | Refills: 0 | DISCHARGE
Start: 2019-05-09

## 2019-05-09 RX ORDER — FINASTERIDE 5 MG/1
1 TABLET, FILM COATED ORAL
Qty: 30 | Refills: 0
Start: 2019-05-09 | End: 2019-06-07

## 2019-05-09 RX ORDER — LOSARTAN POTASSIUM 100 MG/1
1 TABLET, FILM COATED ORAL
Qty: 0 | Refills: 0 | DISCHARGE
Start: 2019-05-09

## 2019-05-09 RX ORDER — TAMSULOSIN HYDROCHLORIDE 0.4 MG/1
1 CAPSULE ORAL
Qty: 0 | Refills: 0 | DISCHARGE
Start: 2019-05-09

## 2019-05-09 RX ADMIN — LOSARTAN POTASSIUM 100 MILLIGRAM(S): 100 TABLET, FILM COATED ORAL at 05:48

## 2019-05-09 RX ADMIN — Medication 175 MICROGRAM(S): at 05:48

## 2019-05-09 RX ADMIN — Medication 100 MILLIGRAM(S): at 05:48

## 2019-05-09 RX ADMIN — PANTOPRAZOLE SODIUM 40 MILLIGRAM(S): 20 TABLET, DELAYED RELEASE ORAL at 05:48

## 2019-05-09 RX ADMIN — Medication 100 MILLIGRAM(S): at 13:12

## 2019-05-09 RX ADMIN — HEPARIN SODIUM 5000 UNIT(S): 5000 INJECTION INTRAVENOUS; SUBCUTANEOUS at 05:48

## 2019-05-09 NOTE — DISCHARGE NOTE NURSING/CASE MANAGEMENT/SOCIAL WORK - NSDCPEEMAIL_GEN_ALL_CORE
[facility] email [email or web address]

## 2019-05-09 NOTE — PROGRESS NOTE ADULT - SUBJECTIVE AND OBJECTIVE BOX
Pt seen resting in bed. Pt c/o mild constant cervical pain. Pain is improving compared to yesterday. Pt denies upper extremities pain, numbness, and weakness. Pt denies changes in bowel and bladder habits.     PE  Gen appearance: NAD  Motor strength: 5/5 of b/l upper extremities  Sensation: intact to light touch  Non-tender to palpation of the cervical spine at this time.    Plan---cervical pain---non-displaced line through anterior osteophyte at C5. There is autofusion at C5-6-7, bridging osteophyte at C4-5  No retropulsion or compression  - Can DC collar and wear it for comfort.  - Pt is cleared from spine standpoint for discharge.  - F/u office as outpatient. Sign off. Reconsult as needed.   - Discussed case with Dr. Torres.
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
## 2019-05-09 NOTE — PROGRESS NOTE ADULT - PROVIDER SPECIALTY LIST ADULT
Hospitalist
Orthopedics
Trauma Surgery

## 2019-05-09 NOTE — DISCHARGE NOTE PROVIDER - CARE PROVIDER_API CALL
Curtis Castaneda)  Urology  [address] [place] [address]  Phone: [phone]  Fax: [phone]  Follow Up Time: 1 week    Shiva Austin)  Orthopaedic Surgery  [address] [place] [address], [address] [place] [address]  Phone: [phone]  Fax: [phone]  Follow Up Time: 1 week    Primary Medical Doctor,   Phone: (   )    -  Fax: (   )    -  Follow Up Time: 1 week
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
## 2019-05-09 NOTE — DISCHARGE NOTE PROVIDER - NSDCFUADDINST_GEN_ALL_CORE_FT
Take all appropriate fall risk precautions. Please follow up with spine surgeon Dr Austin, and urologist Dr Castaneda, and primary medical doctor as instructed after discharge. Please seek immediate medical attention for chest pain, shortness of breath, worsening neck pain, any neurologic changes, or adverse changes to your health.

## 2019-05-09 NOTE — DISCHARGE NOTE NURSING/CASE MANAGEMENT/SOCIAL WORK - NSDCDPATPORTLINK_GEN_ALL_CORE
You can access the [facility] Patient Portal, offered by [facility], by registering with the following website: [email or web address]

## 2019-05-09 NOTE — DISCHARGE NOTE PROVIDER - PROVIDER TOKENS
PROVIDER:[TOKEN:[[id number]],FOLLOWUP:[1 week]],PROVIDER:[TOKEN:[[id number]],FOLLOWUP:[1 week]],FREE:[LAST:[Primary Medical Doctor],PHONE:[(   )    -],FAX:[(   )    -],FOLLOWUP:[1 week]]
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
## 2019-05-09 NOTE — DISCHARGE NOTE NURSING/CASE MANAGEMENT/SOCIAL WORK - NSDCPEWEB_GEN_ALL_CORE
[facility] for Tobacco Control website --- [email or web address] website --- [email or web address]
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
## 2019-05-09 NOTE — DISCHARGE NOTE PROVIDER - NSDCACTIVITY_GEN_ALL_CORE
Walking - Indoors allowed/No heavy lifting/straining/Do not drive or operate machinery/Walking - Outdoors allowed/Do not make important decisions/Stairs allowed/Showering allowed
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
## 2019-05-13 DIAGNOSIS — E43 UNSPECIFIED SEVERE PROTEIN-CALORIE MALNUTRITION: ICD-10-CM
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
## 2019-05-13 DIAGNOSIS — S12.401A UNSPECIFIED NONDISPLACED FRACTURE OF FIFTH CERVICAL VERTEBRA, INITIAL ENCOUNTER FOR CLOSED FRACTURE: ICD-10-CM

## 2019-05-13 DIAGNOSIS — N40.0 BENIGN PROSTATIC HYPERPLASIA WITHOUT LOWER URINARY TRACT SYMPTOMS: ICD-10-CM

## 2019-05-13 DIAGNOSIS — R31.9 HEMATURIA, UNSPECIFIED: ICD-10-CM

## 2019-05-13 DIAGNOSIS — C91.40 HAIRY CELL LEUKEMIA NOT HAVING ACHIEVED REMISSION: ICD-10-CM

## 2019-05-13 DIAGNOSIS — N28.1 CYST OF KIDNEY, ACQUIRED: ICD-10-CM

## 2019-05-13 DIAGNOSIS — I10 ESSENTIAL (PRIMARY) HYPERTENSION: ICD-10-CM

## 2019-05-13 DIAGNOSIS — F10.129 ALCOHOL ABUSE WITH INTOXICATION, UNSPECIFIED: ICD-10-CM

## 2019-05-13 DIAGNOSIS — R91.1 SOLITARY PULMONARY NODULE: ICD-10-CM

## 2019-05-13 DIAGNOSIS — E03.9 HYPOTHYROIDISM, UNSPECIFIED: ICD-10-CM

## 2019-05-13 DIAGNOSIS — F03.90 UNSPECIFIED DEMENTIA WITHOUT BEHAVIORAL DISTURBANCE: ICD-10-CM

## 2019-07-27 NOTE — ED PROVIDER NOTE - CPE EDP NEURO NORM
Implemented All Universal Safety Interventions:  [place] to call system. Call bell, personal items and telephone within reach. Instruct patient to call for assistance. Room bathroom lighting operational. Non-slip footwear when patient is off stretcher. Physically safe environment: no spills, clutter or unnecessary equipment. Stretcher in lowest position, wheels locked, appropriate side rails in place. normal... - - -

## 2019-09-17 NOTE — ED PROVIDER NOTE - SKIN, MLM
Continue Regimen: Epiduo Forte nightly Detail Level: Simple Skin normal color for race, warm, dry and intact. Plan: Patient is interested in Isotretinoin. Patient will need letter from mental health professional faxed to our office stating her depression is stable and the provider is comfortable with her going on a medication that can potentially worsen her depression.\\n\\nPatient declined to give Spironolactone a longer timeframe for improvement. Also declined increasing dosage to see if that would helpful Samples Given: Oracea

## 2019-12-16 ENCOUNTER — INPATIENT (INPATIENT)
Facility: HOSPITAL | Age: 84
LOS: 2 days | Discharge: NURSING, REHAB OR LONG-TERM CARE | DRG: 205 | End: 2019-12-19
Attending: INTERNAL MEDICINE | Admitting: INTERNAL MEDICINE
Payer: MEDICARE

## 2019-12-16 VITALS
TEMPERATURE: 97 F | SYSTOLIC BLOOD PRESSURE: 139 MMHG | DIASTOLIC BLOOD PRESSURE: 63 MMHG | HEIGHT: 72 IN | RESPIRATION RATE: 17 BRPM | OXYGEN SATURATION: 100 % | HEART RATE: 73 BPM | WEIGHT: 149.91 LBS

## 2019-12-16 DIAGNOSIS — R29.6 REPEATED FALLS: ICD-10-CM

## 2019-12-16 LAB
ALBUMIN SERPL ELPH-MCNC: 3.7 G/DL — SIGNIFICANT CHANGE UP (ref 3.3–5)
ALP SERPL-CCNC: 47 U/L — SIGNIFICANT CHANGE UP (ref 40–120)
ALT FLD-CCNC: 17 U/L — SIGNIFICANT CHANGE UP (ref 12–78)
ANION GAP SERPL CALC-SCNC: 7 MMOL/L — SIGNIFICANT CHANGE UP (ref 5–17)
APTT BLD: 29.3 SEC — SIGNIFICANT CHANGE UP (ref 27.5–36.3)
AST SERPL-CCNC: 16 U/L — SIGNIFICANT CHANGE UP (ref 15–37)
BASOPHILS # BLD AUTO: 0.01 K/UL — SIGNIFICANT CHANGE UP (ref 0–0.2)
BASOPHILS NFR BLD AUTO: 0.1 % — SIGNIFICANT CHANGE UP (ref 0–2)
BILIRUB SERPL-MCNC: 1.8 MG/DL — HIGH (ref 0.2–1.2)
BUN SERPL-MCNC: 14 MG/DL — SIGNIFICANT CHANGE UP (ref 7–23)
CALCIUM SERPL-MCNC: 8.6 MG/DL — SIGNIFICANT CHANGE UP (ref 8.5–10.1)
CHLORIDE SERPL-SCNC: 104 MMOL/L — SIGNIFICANT CHANGE UP (ref 96–108)
CO2 SERPL-SCNC: 28 MMOL/L — SIGNIFICANT CHANGE UP (ref 22–31)
CREAT SERPL-MCNC: 0.98 MG/DL — SIGNIFICANT CHANGE UP (ref 0.5–1.3)
EOSINOPHIL # BLD AUTO: 0.02 K/UL — SIGNIFICANT CHANGE UP (ref 0–0.5)
EOSINOPHIL NFR BLD AUTO: 0.3 % — SIGNIFICANT CHANGE UP (ref 0–6)
GLUCOSE SERPL-MCNC: 106 MG/DL — HIGH (ref 70–99)
HCT VFR BLD CALC: 35.4 % — LOW (ref 39–50)
HGB BLD-MCNC: 11.5 G/DL — LOW (ref 13–17)
IMM GRANULOCYTES NFR BLD AUTO: 0.4 % — SIGNIFICANT CHANGE UP (ref 0–1.5)
INR BLD: 1.09 RATIO — SIGNIFICANT CHANGE UP (ref 0.88–1.16)
LYMPHOCYTES # BLD AUTO: 0.63 K/UL — LOW (ref 1–3.3)
LYMPHOCYTES # BLD AUTO: 9.1 % — LOW (ref 13–44)
MCHC RBC-ENTMCNC: 21.8 PG — LOW (ref 27–34)
MCHC RBC-ENTMCNC: 32.5 GM/DL — SIGNIFICANT CHANGE UP (ref 32–36)
MCV RBC AUTO: 67.2 FL — LOW (ref 80–100)
MONOCYTES # BLD AUTO: 0.59 K/UL — SIGNIFICANT CHANGE UP (ref 0–0.9)
MONOCYTES NFR BLD AUTO: 8.5 % — SIGNIFICANT CHANGE UP (ref 2–14)
NEUTROPHILS # BLD AUTO: 5.65 K/UL — SIGNIFICANT CHANGE UP (ref 1.8–7.4)
NEUTROPHILS NFR BLD AUTO: 81.6 % — HIGH (ref 43–77)
PLATELET # BLD AUTO: 112 K/UL — LOW (ref 150–400)
POTASSIUM SERPL-MCNC: 4.3 MMOL/L — SIGNIFICANT CHANGE UP (ref 3.5–5.3)
POTASSIUM SERPL-SCNC: 4.3 MMOL/L — SIGNIFICANT CHANGE UP (ref 3.5–5.3)
PROT SERPL-MCNC: 6.9 GM/DL — SIGNIFICANT CHANGE UP (ref 6–8.3)
PROTHROM AB SERPL-ACNC: 12.1 SEC — SIGNIFICANT CHANGE UP (ref 10–12.9)
RBC # BLD: 5.27 M/UL — SIGNIFICANT CHANGE UP (ref 4.2–5.8)
RBC # FLD: 15.7 % — HIGH (ref 10.3–14.5)
SODIUM SERPL-SCNC: 139 MMOL/L — SIGNIFICANT CHANGE UP (ref 135–145)
TROPONIN I SERPL-MCNC: <0.015 NG/ML — SIGNIFICANT CHANGE UP (ref 0.01–0.04)
WBC # BLD: 6.93 K/UL — SIGNIFICANT CHANGE UP (ref 3.8–10.5)
WBC # FLD AUTO: 6.93 K/UL — SIGNIFICANT CHANGE UP (ref 3.8–10.5)

## 2019-12-16 PROCEDURE — 85025 COMPLETE CBC W/AUTO DIFF WBC: CPT

## 2019-12-16 PROCEDURE — 73060 X-RAY EXAM OF HUMERUS: CPT | Mod: 26,RT

## 2019-12-16 PROCEDURE — 84484 ASSAY OF TROPONIN QUANT: CPT

## 2019-12-16 PROCEDURE — 97530 THERAPEUTIC ACTIVITIES: CPT | Mod: GP

## 2019-12-16 PROCEDURE — 97116 GAIT TRAINING THERAPY: CPT | Mod: GP

## 2019-12-16 PROCEDURE — 36415 COLL VENOUS BLD VENIPUNCTURE: CPT

## 2019-12-16 PROCEDURE — 82607 VITAMIN B-12: CPT

## 2019-12-16 PROCEDURE — 86780 TREPONEMA PALLIDUM: CPT

## 2019-12-16 PROCEDURE — 71260 CT THORAX DX C+: CPT | Mod: 26

## 2019-12-16 PROCEDURE — 83735 ASSAY OF MAGNESIUM: CPT

## 2019-12-16 PROCEDURE — 84443 ASSAY THYROID STIM HORMONE: CPT

## 2019-12-16 PROCEDURE — 86618 LYME DISEASE ANTIBODY: CPT

## 2019-12-16 PROCEDURE — 80053 COMPREHEN METABOLIC PANEL: CPT

## 2019-12-16 PROCEDURE — 70450 CT HEAD/BRAIN W/O DYE: CPT | Mod: 26

## 2019-12-16 PROCEDURE — 84100 ASSAY OF PHOSPHORUS: CPT

## 2019-12-16 PROCEDURE — 74177 CT ABD & PELVIS W/CONTRAST: CPT | Mod: 26

## 2019-12-16 RX ORDER — ACETAMINOPHEN 500 MG
650 TABLET ORAL EVERY 4 HOURS
Refills: 0 | Status: DISCONTINUED | OUTPATIENT
Start: 2019-12-16 | End: 2019-12-19

## 2019-12-16 RX ORDER — SODIUM CHLORIDE 9 MG/ML
1000 INJECTION INTRAMUSCULAR; INTRAVENOUS; SUBCUTANEOUS
Refills: 0 | Status: DISCONTINUED | OUTPATIENT
Start: 2019-12-16 | End: 2019-12-19

## 2019-12-16 RX ORDER — LACTULOSE 10 G/15ML
10 SOLUTION ORAL DAILY
Refills: 0 | Status: DISCONTINUED | OUTPATIENT
Start: 2019-12-16 | End: 2019-12-19

## 2019-12-16 RX ORDER — HEPARIN SODIUM 5000 [USP'U]/ML
5000 INJECTION INTRAVENOUS; SUBCUTANEOUS EVERY 12 HOURS
Refills: 0 | Status: DISCONTINUED | OUTPATIENT
Start: 2019-12-16 | End: 2019-12-19

## 2019-12-16 RX ORDER — LEVOTHYROXINE SODIUM 125 MCG
175 TABLET ORAL DAILY
Refills: 0 | Status: DISCONTINUED | OUTPATIENT
Start: 2019-12-16 | End: 2019-12-19

## 2019-12-16 RX ORDER — SODIUM CHLORIDE 9 MG/ML
1000 INJECTION INTRAMUSCULAR; INTRAVENOUS; SUBCUTANEOUS ONCE
Refills: 0 | Status: COMPLETED | OUTPATIENT
Start: 2019-12-16 | End: 2019-12-16

## 2019-12-16 RX ADMIN — SODIUM CHLORIDE 75 MILLILITER(S): 9 INJECTION INTRAMUSCULAR; INTRAVENOUS; SUBCUTANEOUS at 23:45

## 2019-12-16 RX ADMIN — SODIUM CHLORIDE 1000 MILLILITER(S): 9 INJECTION INTRAMUSCULAR; INTRAVENOUS; SUBCUTANEOUS at 17:52

## 2019-12-16 NOTE — ED ADULT NURSE NOTE - OBJECTIVE STATEMENT
87 y/o M presents to the ED c/o fall yesterday. Pt c/o right rib pain, ecchymosis noted on arm throughout and skin tears.
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
## 2019-12-16 NOTE — ED PROVIDER NOTE - OBJECTIVE STATEMENT
87 yo M hx of presents with CC fall, weakness.  Ambulates with walker at baseline.  States he was walking at home with walker yesterday fell, hitting right chest/side.  Denies head injury or LOC. 85 yo M hx HTN, Hypothyroidism, BPH, hairy-cell Leukemia of presents with CC fall, weakness.  Ambulates with walker at baseline.  States he was walking at home with walker yesterday fell, hitting right chest/side.  He states he's had three falls.  Denies head injury or LOC.  Denies fever, chills, cough, SOB, abdominal pain, n/v/d, rash, or any other symptoms.
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
## 2019-12-16 NOTE — ED PROVIDER NOTE - CARE PLAN
Principal Discharge DX:	Frequent falls  Secondary Diagnosis:	Rib fracture  Secondary Diagnosis:	Dehydration

## 2019-12-16 NOTE — H&P ADULT - HISTORY OF PRESENT ILLNESS
87 y/o M with PMH of HTN, hypothyroidism, BPH, hair cell leukemia, p/w fall. Patient states he was walking with his walker and his legs got tangled up and he fell on his R side hitting his R lateral chest wall. Ln CT patient found to have possible non-displaced fracture of lateral aspect of R 11th rib. Patient states presently pain is well controlled. Patient also denies any symptoms before or after episode, and denies any LOC. Denies CP / palpitations / dizziness / lightheadedness. Patient states that he also felt everyday the two days prior as well, and each time patient states it was because he felt like his "legs tangled up". States that he only recently got the walker and he is still learning to use it properly. Patient states he also has occasional generalized weakness, but the weakness is mostly in his B/L arms, and denies weakness in his legs. Denies sensory deficits. Denies nausea, vomiting, abdominal pain, urinary complaints, unhealing wounds, rashes. Patient states he has been having some unsteady gait lately.     PSH: Thyroidectomy, cholecystectomy    Social Hx: Former smoker, etoh - 1 drink daily, drugs, denies     Family Hx: Denies

## 2019-12-16 NOTE — ED ADULT NURSE REASSESSMENT NOTE - NS ED NURSE REASSESS COMMENT FT1
Pt provided warm blanket and pillow. No complaints at this time. PT pending bed assignment. Will continue to monitor for safety and comfort.

## 2019-12-16 NOTE — ED ADULT NURSE NOTE - NSIMPLEMENTINTERV_GEN_ALL_ED
Implemented All Fall with Harm Risk Interventions:  [place] to call system. Call bell, personal items and telephone within reach. Instruct patient to call for assistance. Room bathroom lighting operational. Non-slip footwear when patient is off stretcher. Physically safe environment: no spills, clutter or unnecessary equipment. Stretcher in lowest position, wheels locked, appropriate side rails in place. Provide visual cue, wrist band, yellow gown, etc. Monitor gait and stability. Monitor for mental status changes and reorient to person, place, and time. Review medications for side effects contributing to fall risk. Reinforce activity limits and safety measures with patient and family. Provide visual clues: red socks.

## 2019-12-16 NOTE — ED ADULT TRIAGE NOTE - CHIEF COMPLAINT QUOTE
Patient brought in by EMS for weakness for 3 days and fall yesterday afternoon from "missed step". Patient denies taking blood thinners and denies head injury. Patient has dried blood on ear from abrasion when asked where it is from patient states he hit his head there but didn't hit the top of his head. Patient denies taking blood thinners.

## 2019-12-17 LAB
ALBUMIN SERPL ELPH-MCNC: 3.1 G/DL — LOW (ref 3.3–5)
ALP SERPL-CCNC: 46 U/L — SIGNIFICANT CHANGE UP (ref 40–120)
ALT FLD-CCNC: 14 U/L — SIGNIFICANT CHANGE UP (ref 12–78)
ANION GAP SERPL CALC-SCNC: 5 MMOL/L — SIGNIFICANT CHANGE UP (ref 5–17)
AST SERPL-CCNC: 12 U/L — LOW (ref 15–37)
BASOPHILS # BLD AUTO: 0.02 K/UL — SIGNIFICANT CHANGE UP (ref 0–0.2)
BASOPHILS NFR BLD AUTO: 0.3 % — SIGNIFICANT CHANGE UP (ref 0–2)
BILIRUB SERPL-MCNC: 1.5 MG/DL — HIGH (ref 0.2–1.2)
BUN SERPL-MCNC: 11 MG/DL — SIGNIFICANT CHANGE UP (ref 7–23)
CALCIUM SERPL-MCNC: 8.2 MG/DL — LOW (ref 8.5–10.1)
CHLORIDE SERPL-SCNC: 103 MMOL/L — SIGNIFICANT CHANGE UP (ref 96–108)
CO2 SERPL-SCNC: 28 MMOL/L — SIGNIFICANT CHANGE UP (ref 22–31)
CREAT SERPL-MCNC: 0.91 MG/DL — SIGNIFICANT CHANGE UP (ref 0.5–1.3)
EOSINOPHIL # BLD AUTO: 0.04 K/UL — SIGNIFICANT CHANGE UP (ref 0–0.5)
EOSINOPHIL NFR BLD AUTO: 0.7 % — SIGNIFICANT CHANGE UP (ref 0–6)
GLUCOSE SERPL-MCNC: 86 MG/DL — SIGNIFICANT CHANGE UP (ref 70–99)
HCT VFR BLD CALC: 32.7 % — LOW (ref 39–50)
HGB BLD-MCNC: 10.6 G/DL — LOW (ref 13–17)
IMM GRANULOCYTES NFR BLD AUTO: 0.5 % — SIGNIFICANT CHANGE UP (ref 0–1.5)
LYME C6 AB IGG/IGM EIA REFLEX WESTERN BL: SIGNIFICANT CHANGE UP
LYMPHOCYTES # BLD AUTO: 0.67 K/UL — LOW (ref 1–3.3)
LYMPHOCYTES # BLD AUTO: 11.3 % — LOW (ref 13–44)
MAGNESIUM SERPL-MCNC: 1.8 MG/DL — SIGNIFICANT CHANGE UP (ref 1.6–2.6)
MCHC RBC-ENTMCNC: 22 PG — LOW (ref 27–34)
MCHC RBC-ENTMCNC: 32.4 GM/DL — SIGNIFICANT CHANGE UP (ref 32–36)
MCV RBC AUTO: 68 FL — LOW (ref 80–100)
MONOCYTES # BLD AUTO: 0.51 K/UL — SIGNIFICANT CHANGE UP (ref 0–0.9)
MONOCYTES NFR BLD AUTO: 8.6 % — SIGNIFICANT CHANGE UP (ref 2–14)
NEUTROPHILS # BLD AUTO: 4.65 K/UL — SIGNIFICANT CHANGE UP (ref 1.8–7.4)
NEUTROPHILS NFR BLD AUTO: 78.6 % — HIGH (ref 43–77)
PHOSPHATE SERPL-MCNC: 2.7 MG/DL — SIGNIFICANT CHANGE UP (ref 2.5–4.5)
PLATELET # BLD AUTO: 102 K/UL — LOW (ref 150–400)
POTASSIUM SERPL-MCNC: 4.1 MMOL/L — SIGNIFICANT CHANGE UP (ref 3.5–5.3)
POTASSIUM SERPL-SCNC: 4.1 MMOL/L — SIGNIFICANT CHANGE UP (ref 3.5–5.3)
PROT SERPL-MCNC: 6.5 GM/DL — SIGNIFICANT CHANGE UP (ref 6–8.3)
RBC # BLD: 4.81 M/UL — SIGNIFICANT CHANGE UP (ref 4.2–5.8)
RBC # FLD: 15.8 % — HIGH (ref 10.3–14.5)
SODIUM SERPL-SCNC: 136 MMOL/L — SIGNIFICANT CHANGE UP (ref 135–145)
T PALLIDUM AB TITR SER: NEGATIVE — SIGNIFICANT CHANGE UP
TSH SERPL-MCNC: 25.5 UU/ML — HIGH (ref 0.34–4.82)
WBC # BLD: 5.92 K/UL — SIGNIFICANT CHANGE UP (ref 3.8–10.5)
WBC # FLD AUTO: 5.92 K/UL — SIGNIFICANT CHANGE UP (ref 3.8–10.5)

## 2019-12-17 PROCEDURE — 99223 1ST HOSP IP/OBS HIGH 75: CPT

## 2019-12-17 RX ADMIN — HEPARIN SODIUM 5000 UNIT(S): 5000 INJECTION INTRAVENOUS; SUBCUTANEOUS at 18:10

## 2019-12-17 RX ADMIN — HEPARIN SODIUM 5000 UNIT(S): 5000 INJECTION INTRAVENOUS; SUBCUTANEOUS at 05:58

## 2019-12-17 RX ADMIN — Medication 175 MICROGRAM(S): at 05:58

## 2019-12-17 NOTE — PHYSICAL THERAPY INITIAL EVALUATION ADULT - GENERAL OBSERVATIONS, REHAB EVAL
Pt. received supine in bed, pleasant and cooperative with PT, endorses that he has had three falls in the last week, no c/o pain at rest.

## 2019-12-17 NOTE — PHYSICAL THERAPY INITIAL EVALUATION ADULT - PERTINENT HX OF CURRENT PROBLEM, REHAB EVAL
87 y/o M with PMH of HTN, hypothyroidism, BPH, hair cell leukemia, p/w fall 12/16. Pt. states he was walking with his walker and his legs got tangled up and he fell on his R side hitting his R lateral chest wall. Ln CT found to have possible non-displaced fracture of lateral aspect of R 11th rib. Pt. states he also fell two other times in the same week and only just recently got his walker and is learning how to use it. Pt. endorses occasional weakness in BUE, not BLE.

## 2019-12-17 NOTE — PHYSICAL THERAPY INITIAL EVALUATION ADULT - GAIT DEVIATIONS NOTED, PT EVAL
Pt. ambulates with an unsteady gait, decreased amairani and step length, flexed posture, mild knee buckling with self correction

## 2019-12-17 NOTE — PROVIDER CONTACT NOTE (OTHER) - SITUATION
Called dr Candido steve
left message for Dr. Rey office staff
left message for Dr. Winston for morning consult.

## 2019-12-17 NOTE — CONSULT NOTE ADULT - ASSESSMENT
86 year old man hx of recurrent falls, poss underlying neuropathy, no long tract finding. elevated TSH.  suggest: check vit b12, folate  endocrinology eval  Pt referral as outpatient. emg/ncv as outpatient

## 2019-12-17 NOTE — PROGRESS NOTE ADULT - SUBJECTIVE AND OBJECTIVE BOX
c/c: fall    HPI:  85 y/o M with PMH of HTN, hypothyroidism, BPH, hairy cell leukemia, p/w fall. Patient states he was walking with his walker and his legs got tangled up and he fell on his R side hitting his R lateral chest wall. In CT patient found to have possible non-displaced fracture of lateral aspect of R 11th rib.  Patient states presently pain is well controlled. Patient also denies any symptoms before or after episode, and denies any LOC. Denies CP / palpitations / dizziness / lightheadedness. Patient states that he also felt everyday the two days prior as well, and each time patient states it was because he felt like his "legs tangled up". States that he only recently got the walker and he is still learning to use it properly. Patient states he also has occasional generalized weakness, but the weakness is mostly in his B/L arms, and denies weakness in his legs. Denies sensory deficits. Denies nausea, vomiting, abdominal pain, urinary complaints, unhealing wounds, rashes. Patient states he has been having some unsteady gait lately.     12/17: pt seen and examined this am. Felt well. Was awaiting physical therapy eval. Upon questioning takes his synthroid with food.       Review of system- All 10 systems reviewed and is as per HPI otherwise negative.     VITALS  T(C): 36.7 (12-17-19 @ 11:23), Max: 36.9 (12-16-19 @ 21:17)  HR: 71 (12-17-19 @ 11:23) (67 - 73)  BP: 123/47 (12-17-19 @ 11:23) (123/47 - 163/58)  RR: 16 (12-17-19 @ 11:23) (16 - 17)  SpO2: 99% (12-17-19 @ 11:23) (98% - 100%)      PHYSICAL EXAM:    GENERAL: Comfortable, no acute distress  HEAD:  Atraumatic, Normocephalic  EYES: EOMI, PERRLA  HEENT: Moist mucous membranes  NECK: Supple, No JVD  NERVOUS SYSTEM:  Alert & Oriented X3, Motor Strength 5/5 B/L upper and lower extremities  CHEST/LUNG: Right lateral chest wall tenderness  HEART: Regular rate and rhythm; No murmurs, rubs, or gallops  ABDOMEN: Soft, Nontender, Nondistended; Bowel sounds present  GENITOURINARY- Voiding, no palpable bladder  EXTREMITIES:  No clubbing, cyanosis, or edema  MUSCULOSKELETAL- No muscle tenderness, No joint tenderness  SKIN-no rash        LABS:                        10.6   5.92  )-----------( 102      ( 17 Dec 2019 06:41 )             32.7     12-17    136  |  103  |  11  ----------------------------<  86  4.1   |  28  |  0.91    Ca    8.2<L>      17 Dec 2019 06:41  Phos  2.7     12-17  Mg     1.8     12-17    TPro  6.5  /  Alb  3.1<L>  /  TBili  1.5<H>  /  DBili  x   /  AST  12<L>  /  ALT  14  /  AlkPhos  46  12-17    PT/INR - ( 16 Dec 2019 15:37 )   PT: 12.1 sec;   INR: 1.09 ratio         PTT - ( 16 Dec 2019 15:37 )  PTT:29.3 sec        CARDIAC MARKERS ( 17 Dec 2019 06:41 )  <0.015 ng/mL / x     / x     / x     / x      CARDIAC MARKERS ( 16 Dec 2019 15:37 )  <0.015 ng/mL / x     / x     / x     / x            MEDS  acetaminophen   Tablet .. 650 milliGRAM(s) Oral every 4 hours PRN  heparin  Injectable 5000 Unit(s) SubCutaneous every 12 hours  lactulose Syrup 10 Gram(s) Oral daily PRN  levothyroxine 175 MICROGram(s) Oral daily  sodium chloride 0.9%. 1000 milliLiter(s) IV Continuous <Continuous>    ASSESSMENT AND PLAN:  86m, PMH as above a/w:    1.Fall/unsteady gait:  -neuro eval appreciated  -check b12/folate  -tsh noted, but pt not taking synthroid appropriately.  -physical therapy eval  -outpt emg/ncv     2. Hypothyroid:  -continue current dose synthroid  -tsh noted, but pt was taking with food likely not having medication absorbed completely    3. Hairy cell leukemia:  -heme consulted.     4. Right rib fx:  -incentive spirometry  -pain control    5. BPH:  -not on meds  -observe for voiding difficulties.    6. DVT px

## 2019-12-17 NOTE — PHYSICAL THERAPY INITIAL EVALUATION ADULT - ADDITIONAL COMMENTS
Progress Notes by Mabel Vaca APN at 04/17/17 03:48 PM     Author:  Mabel Vaca APN Service:  (none) Author Type:  Nurse Practitioner     Filed:  04/17/17 05:53 PM Encounter Date:  4/17/2017 Status:  Signed     :  Mabel Vaca APN (Nurse Practitioner)            Chart note dictated.  Provider billing:[MA1.1T] Supervising Provider: MD Elsy[MA1.1M]        Revision History        User Key Date/Time User Provider Type Action    > MA1.1 04/17/17 05:53 PM Mabel Vaca APN Nurse Practitioner Sign    M - Manual, T - Template             Pt. reports ambulating with a walker "on and off" for about the last week prior to current hospital course. Wife has a 24 hr HHA but pt. states he does not need help in the house or bathing. Pt. states he has a steep driveway.

## 2019-12-18 ENCOUNTER — TRANSCRIPTION ENCOUNTER (OUTPATIENT)
Age: 84
End: 2019-12-18

## 2019-12-18 DIAGNOSIS — C91.40 HAIRY CELL LEUKEMIA NOT HAVING ACHIEVED REMISSION: ICD-10-CM

## 2019-12-18 DIAGNOSIS — R29.6 REPEATED FALLS: ICD-10-CM

## 2019-12-18 DIAGNOSIS — S22.39XA FRACTURE OF ONE RIB, UNSPECIFIED SIDE, INITIAL ENCOUNTER FOR CLOSED FRACTURE: ICD-10-CM

## 2019-12-18 LAB — TSH SERPL-MCNC: 24.4 UU/ML — HIGH (ref 0.34–4.82)

## 2019-12-18 RX ADMIN — HEPARIN SODIUM 5000 UNIT(S): 5000 INJECTION INTRAVENOUS; SUBCUTANEOUS at 07:06

## 2019-12-18 RX ADMIN — HEPARIN SODIUM 5000 UNIT(S): 5000 INJECTION INTRAVENOUS; SUBCUTANEOUS at 17:31

## 2019-12-18 RX ADMIN — Medication 175 MICROGRAM(S): at 14:59

## 2019-12-18 NOTE — DISCHARGE NOTE PROVIDER - HOSPITAL COURSE
87 y/o M with PMH of HTN not on meds, hypothyroidism, BPH, hairy cell leukemia, p/w fall. Patient states he was walking with his walker and his legs got tangled up and he fell on his R side hitting his R lateral chest wall. In CT patient found to have possible non-displaced fracture of lateral aspect of R 11th rib.  Patient states presently pain is well controlled. Patient also denies any symptoms before or after episode, and denies any LOC. Denies CP / palpitations / dizziness / lightheadedness. Patient states that he also felt everyday the two days prior as well, and each time patient states it was because he felt like his "legs tangled up". States that he only recently got the walker and he is still learning to use it properly. Patient states he also has occasional generalized weakness, but the weakness is mostly in his B/L arms, and denies weakness in his legs. Denies sensory deficits. Denies nausea, vomiting, abdominal pain, urinary complaints, unhealing wounds, rashes. Patient states he has been having some unsteady gait lately. He was admitted for further evaluation. Seen by neurology and recommended outpt emg/ncv. His TSH was noted to be around 25. Upon further questioning, was taking his synthroid with food. He is advised not to eat or drink anything other than water for about 1 hour before and after taking his synthroid. He will need repeat TSH in 2-3 weeks.     He also was seen by Dr. Rey from oncology and recommended outpt f/u in 6 mos. He is strongly recommended MARY, but he is refusing. He understands risks of recurrent falls/injuries.        Vital Signs Last 24 Hrs    T(C): 36.4 (18 Dec 2019 10:47), Max: 37.1 (18 Dec 2019 05:05)    T(F): 97.5 (18 Dec 2019 10:47), Max: 98.7 (18 Dec 2019 05:05)    HR: 79 (18 Dec 2019 10:47) (57 - 79)    BP: 124/53 (18 Dec 2019 10:47) (119/54 - 135/55)    BP(mean): 70 (18 Dec 2019 10:47) (70 - 70)    RR: 16 (18 Dec 2019 10:47) (16 - 16)    SpO2: 99% (18 Dec 2019 10:47) (98% - 100%)        PHYSICAL EXAM:        GENERAL: Comfortable, no acute distress     HEAD:  Normocephalic, atraumatic    EYES: EOMI, PERRLA    HEENT: Moist mucous membranes    NECK: Supple, No JVD    NERVOUS SYSTEM:  Alert & Oriented X3    CHEST/LUNG: Clear to auscultation bilaterally    HEART: Regular rate and rhythm    ABDOMEN: Soft, Nontender, Nondistended, Bowel sounds present    GENITOURINARY: Voiding, no palpable bladder    EXTREMITIES:   No clubbing, cyanosis, or edema    MUSCULOSKELETAL- No muscle tenderness, no joint tenderness    SKIN-no rash        LABS:                            10.6     5.92  )-----------( 102      ( 17 Dec 2019 06:41 )               32.7         12-17        136  |  103  |  11    ----------------------------<  86    4.1   |  28  |  0.91        Ca    8.2<L>      17 Dec 2019 06:41    Phos  2.7     12-17    Mg     1.8     12-17        TPro  6.5  /  Alb  3.1<L>  /  TBili  1.5<H>  /  DBili  x   /  AST  12<L>  /  ALT  14  /  AlkPhos  46  12-17        PT/INR - ( 16 Dec 2019 15:37 )   PT: 12.1 sec;   INR: 1.09 ratio               PTT - ( 16 Dec 2019 15:37 )  PTT:29.3 sec        CARDIAC MARKERS ( 17 Dec 2019 06:41 )    <0.015 ng/mL / x     / x     / x     / x        CARDIAC MARKERS ( 16 Dec 2019 15:37 )    <0.015 ng/mL / x     / x     / x     / x                CT Chest w/ IV Cont (12.16.19 @ 17:03) >        IMPRESSION:         Question for nondisplaced fracture of the lateral aspect of the right     11th rib. Small right pleural effusion.        Otherwise, no CT evidence of traumatic injury to the chest, abdomen and     pelvis.        Incidental findings are as above.        FINAL DIAGNOSIS:        1.Fall/unsteady gait:    -likely underlying peripheral neuropathy, for further w/u as outpt.     2. Hypothyroid-uncontrolled likely related to poor absorption given he was taking med with food.    3. Right 11th rib fracture    4. BPH    5. Hairy cell leukemia.        time taken for dc 52 min    plan d/w patinet in detail, d/w Dr. Rey, left message for pcp. 85 y/o M with PMH of HTN not on meds, hypothyroidism, BPH, hairy cell leukemia, p/w fall. Patient states he was walking with his walker and his legs got tangled up and he fell on his R side hitting his R lateral chest wall. In CT patient found to have possible non-displaced fracture of lateral aspect of R 11th rib.  Patient states presently pain is well controlled. Patient also denies any symptoms before or after episode, and denies any LOC. Denies CP / palpitations / dizziness / lightheadedness. Patient states that he also felt everyday the two days prior as well, and each time patient states it was because he felt like his "legs tangled up". States that he only recently got the walker and he is still learning to use it properly. Patient states he also has occasional generalized weakness, but the weakness is mostly in his B/L arms, and denies weakness in his legs. Denies sensory deficits. Denies nausea, vomiting, abdominal pain, urinary complaints, unhealing wounds, rashes. Patient states he has been having some unsteady gait lately. He was admitted for further evaluation. Seen by neurology and recommended outpt emg/ncv. His TSH was noted to be around 25. Upon further questioning, was taking his synthroid with food. He is advised not to eat or drink anything other than water for about 1 hour before and after taking his synthroid. He will need repeat TSH in 2-3 weeks.     He also was seen by Dr. Rey from oncology and recommended outpt f/u in 6 mos. He is strongly recommended MARY, and has finally agreed         for physical exam please see progress note from 12/19        LABS:                            10.6     5.92  )-----------( 102      ( 17 Dec 2019 06:41 )               32.7         12-17        136  |  103  |  11    ----------------------------<  86    4.1   |  28  |  0.91        Ca    8.2<L>      17 Dec 2019 06:41    Phos  2.7     12-17    Mg     1.8     12-17        TPro  6.5  /  Alb  3.1<L>  /  TBili  1.5<H>  /  DBili  x   /  AST  12<L>  /  ALT  14  /  AlkPhos  46  12-17        PT/INR - ( 16 Dec 2019 15:37 )   PT: 12.1 sec;   INR: 1.09 ratio               PTT - ( 16 Dec 2019 15:37 )  PTT:29.3 sec        CARDIAC MARKERS ( 17 Dec 2019 06:41 )    <0.015 ng/mL / x     / x     / x     / x        CARDIAC MARKERS ( 16 Dec 2019 15:37 )    <0.015 ng/mL / x     / x     / x     / x                CT Chest w/ IV Cont (12.16.19 @ 17:03) >        IMPRESSION:         Question for nondisplaced fracture of the lateral aspect of the right     11th rib. Small right pleural effusion.        Otherwise, no CT evidence of traumatic injury to the chest, abdomen and     pelvis.        Incidental findings are as above.        FINAL DIAGNOSIS:        1.Fall/unsteady gait:    -likely underlying peripheral neuropathy, for further w/u as outpt.     2. Hypothyroid-uncontrolled likely related to poor absorption given he was taking med with food.    3. Right 11th rib fracture    4. BPH    5. Hairy cell leukemia.        time taken for dc 52 min    plan d/w patinet in detail, d/w Dr. Rey, left message for pcp.

## 2019-12-18 NOTE — DIETITIAN INITIAL EVALUATION ADULT. - PERTINENT LABORATORY DATA
Sodium, Serum: 136 mmol/L (12.17.19 @ 06:41); Potassium, Serum: 4.1 mmol/L (12.17.19 @ 06:41); Glucose, Serum: 86 mg/dL (12.17.19 @ 06:41); Albumin, Serum: 3.1 g/dL (12.17.19 @ 06:41)

## 2019-12-18 NOTE — DIETITIAN INITIAL EVALUATION ADULT. - MALNUTRITION
Pt meets criteria for severe malnutrition in the context of chronic illness AEB severe muscle wasting, severe fat depletion, PO intake < 75% ENN

## 2019-12-18 NOTE — DIETITIAN INITIAL EVALUATION ADULT. - PERTINENT MEDS FT
Normal vision: sees adequately in most situations; can see medication labels, newsprint MEDICATIONS  (STANDING):  heparin  Injectable 5000 Unit(s) SubCutaneous every 12 hours  levothyroxine 175 MICROGram(s) Oral daily  sodium chloride 0.9%. 1000 milliLiter(s) (75 mL/Hr) IV Continuous <Continuous>    MEDICATIONS  (PRN):  acetaminophen   Tablet .. 650 milliGRAM(s) Oral every 4 hours PRN Mild Pain (1 - 3), Moderate Pain (4 - 6)  lactulose Syrup 10 Gram(s) Oral daily PRN constipation

## 2019-12-18 NOTE — DISCHARGE NOTE NURSING/CASE MANAGEMENT/SOCIAL WORK - PATIENT PORTAL LINK FT
You can access the FollowMyHealth Patient Portal offered by [facility] by registering at the following website: [email or web address]. By joining [facility]’s FollowMyHealth portal, you will also be able to view your health information using other applications (apps) compatible with our system.

## 2019-12-18 NOTE — CONSULT NOTE ADULT - SUBJECTIVE AND OBJECTIVE BOX
Neurology Consult requested by:   Patient is a 86y old  Male who presents with a chief complaint of fall (16 Dec 2019 21:54)     HPI:  85 y/o M with PMH of HTN, hypothyroidism, BPH, hairy cell leukemia, alcoholism, dementia, p/w fall. Patient states his legs got tangled up on walker, and he fell, denies any LOC.  Patient states that he has been falling, trips, stumbles  reports has generalized weakness. Denies sensory deficits, no pins/needles sensations.   Denies headache, dizziness, no nausea, vomiting, no urinary complaints, no recent fever or chills, no CP, palpitations.     PSH: Thyroidectomy, cholecystectomy    Social Hx: Former smoker, etoh - 1 drink daily, drugs, denies     Family Hx: Denies (16 Dec 2019 21:54)      PAST MEDICAL & SURGICAL HISTORY:  Dementia  Leukemia  Hypothyroidism  HTN (hypertension)  No significant past surgical history    FAMILY HISTORY:    Social Hx:  Nonsmoker, no drug or alcohol use  Medications and Allergies ReviewedMEDICATIONS  (STANDING):  heparin  Injectable 5000 Unit(s) SubCutaneous every 12 hours  levothyroxine 175 MICROGram(s) Oral daily  sodium chloride 0.9%. 1000 milliLiter(s) (75 mL/Hr) IV Continuous <Continuous>     ROS: Pertinent positives in HPI, all other ROS were reviewed and are negative.      Examination:   Vital Signs Last 24 Hrs  T(C): 36.7 (17 Dec 2019 11:23), Max: 36.9 (16 Dec 2019 21:17)  T(F): 98 (17 Dec 2019 11:23), Max: 98.4 (16 Dec 2019 21:17)  HR: 71 (17 Dec 2019 11:23) (67 - 73)  BP: 123/47 (17 Dec 2019 11:23) (123/47 - 163/58)  BP(mean): --  RR: 16 (17 Dec 2019 11:23) (16 - 17)  SpO2: 99% (17 Dec 2019 11:23) (98% - 100%)  General: Cooperative, NAD   NECK: supple, no masses  ENT: reduced  hearing   Vascular : no carotid bruits,   Lungs: CTAB  Chest: RRR, no murmurs  Extremities: nontender, no edema  Musculoskeletal: no adventitious movements, no joint stiffness  Skin: no rash    Neurological Examination:  NIHSS:0  MS: AOx3. interactive, normal affect. Speech fluent, names and repeats   CN: PERLL, EOMI, V1-3 sensation intact, face symmetric, hearing intact, palate elevates symmetrically, tongue midline, SCM equal bilaterally  Motor: normal bulk and tone, no tremor, rigidity or bradykinesia.  5-/5 all over   Sens: Intact to light touch.    Reflexes: 0-1/4 all over, no clonus, downgoing toes b/l  Coord:  No dysmetria   Gait: Cannot test    Labs: Reviewed  Thyroid Stimulating Hormone, Serum: 25.50 uU/mL (12.17.19 @ 06:41)    Comprehensive Metabolic Panel (12.17.19 @ 06:41)    Sodium, Serum: 136 mmol/L    Potassium, Serum: 4.1 mmol/L    Chloride, Serum: 103 mmol/L    Carbon Dioxide, Serum: 28 mmol/L    Anion Gap, Serum: 5 mmol/L    Blood Urea Nitrogen, Serum: 11 mg/dL    Creatinine, Serum: 0.91 mg/dL    Glucose, Serum: 86 mg/dL    Calcium, Total Serum: 8.2 mg/dL    Protein Total, Serum: 6.5 gm/dL    Albumin, Serum: 3.1 g/dL    Bilirubin Total, Serum: 1.5 mg/dL    Alkaline Phosphatase, Serum: 46 U/L    Aspartate Aminotransferase (AST/SGOT): 12 U/L    Alanine Aminotransferase (ALT/SGPT): 14 U/L    eGFR if Non : 76    Imaging:   < from: CT Head No Cont (12.16.19 @ 16:59) >  FINDINGS:   CT dated 05/05/2019 available for review.  The brain demonstrates mild periventricular white matter ischemia.   No   acute cerebral cortical infarct is seen.  No intracranial hemorrhage is   found.  No mass effect is found in the brain.    The ventricles, sulci and basal cisterns appear unremarkable.  The orbits show a LEFT orbital prosthesis.  The paranasal sinuses are   clear.  The nasal cavity appears intact.  The nasopharynx is symmetric.    The central skull base, petrous temporal bones and calvarium remain   intact.  IMPRESSION: Mild periventricular white matter ischemia.    < from: CT Cervical Spine No Cont (05.05.19 @ 22:21) >  Cervical Spine CT Findings:   The normal cervical lordosis is maintained.  There is minimal   anterolisthesis at C4-C5. There is an acute appearing nondisplaced   fracture extending through the right aspect of the superior endplate of   C5 with extension into an anterior bridging osteophyte (601, 43). There   is chronic mild loss of height at C6. There are multilevel degenerative   changes characterized by disc osteophyte complexes and facet and uncinate   hypertrophy. This results in mild canal stenosis and multilevel neural   foraminal narrowing. Status post thyroidectomy.    Impression:   1. No evidence of acute intracranial trauma.  2. Acute nondisplaced fracture through the superior endplate of C5   extending into an anterior bridging osteophyte. Consider MRI to further   evaluate for extent of injury. Findings were discussed with Dr. Jalloh at   10:43 PM on 5/5/2019.    < from: MR Cervical Spine No Cont (05.06.19 @ 11:33) >  IMPRESSION:  Faint linear T2 weighted horizontal T2-weighted signal   within the C4 and C5 vertebral bodies which may reflect fractures without   compression.  Minimal prevertebral edema is noted.     Multilevel   degenerative disc disease and spondylosis at C4-5 and C5-6. Osseous   fusion noted at C6-7. There is narrowing of the LEFT C4-5, LEFT C5-6   neural foramina due to uncovertebral spurring and facet osteophytic   hypertrophy.    < end of copied text >
Patient is a 86y old  Male who presents with a chief complaint of fall (18 Dec 2019 10:11)      HPI:  87 y/o M with PMH of HTN, hypothyroidism, BPH, hair cell leukemia, p/w fall  the patient was diagnosed with hairy cell leukemia in 2012 and was treated with cladribine  he was retreated with cladribine ( 2-CDA) in 2017 and has been observed without treatment since then    he has lost weight but scans done previously did not show any bulky adenopathy.    admitted with a fall , legs got tangled up.    PSH: Thyroidectomy, cholecystectomy    Social Hx: Former smoker, etoh - 1 drink daily, drugs, denies     Family Hx: Denies (16 Dec 2019 21:54)      PAST MEDICAL & SURGICAL HISTORY:  Dementia  Leukemia  Hypothyroidism  HTN (hypertension)  No significant past surgical history  Thalassemia trait    REVIEW OF SYSTEMS    General:	  well  weak  no shortness of breath  no chest pain  no pain    Allergies    No Known Allergies    Intolerances    Occupation:  Marital Status:   wife has dementia      FAMILY HISTORY:      Home Medications:  levothyroxine 175 mcg (0.175 mg) oral tablet: 1 tab(s) orally once a day (16 Dec 2019 21:56)      MEDICATIONS  (STANDING):  heparin  Injectable 5000 Unit(s) SubCutaneous every 12 hours  levothyroxine 175 MICROGram(s) Oral daily  sodium chloride 0.9%. 1000 milliLiter(s) (75 mL/Hr) IV Continuous <Continuous>    MEDICATIONS  (PRN):  acetaminophen   Tablet .. 650 milliGRAM(s) Oral every 4 hours PRN Mild Pain (1 - 3), Moderate Pain (4 - 6)  lactulose Syrup 10 Gram(s) Oral daily PRN constipation      PHYSICAL EXAM:  Vital Signs Last 24 Hrs  T(C): 37.1 (18 Dec 2019 05:05), Max: 37.1 (18 Dec 2019 05:05)  T(F): 98.7 (18 Dec 2019 05:05), Max: 98.7 (18 Dec 2019 05:05)  HR: 68 (18 Dec 2019 05:05) (57 - 71)  BP: 121/57 (18 Dec 2019 05:05) (119/54 - 135/55)  BP(mean): --  RR: 16 (18 Dec 2019 05:05) (16 - 16)  SpO2: 98% (18 Dec 2019 05:05) (98% - 100%)      Gen:  thin  no lymphadenopathy  no splenomegaly  chest clear  H1 h2  no neurological deficit  LABS:                        10.6   5.92  )-----------( 102      ( 17 Dec 2019 06:41 )             32.7     MCV 68    17 Dec 2019 06:41    136    |  103    |  11     ----------------------------<  86     4.1     |  28     |  0.91     Ca    8.2        17 Dec 2019 06:41  Phos  2.7       17 Dec 2019 06:41  Mg     1.8       17 Dec 2019 06:41    TPro  6.5    /  Alb  3.1    /  TBili  1.5    /  DBili  x      /  AST  12     /  ALT  14     /  AlkPhos  46     17 Dec 2019 06:41    LIVER FUNCTIONS - ( 17 Dec 2019 06:41 )  Alb: 3.1 g/dL / Pro: 6.5 gm/dL / ALK PHOS: 46 U/L / ALT: 14 U/L / AST: 12 U/L / GGT: x           PT/INR - ( 16 Dec 2019 15:37 )   PT: 12.1 sec;   INR: 1.09 ratio         PTT - ( 16 Dec 2019 15:37 )  PTT:29.3 sec      RADIOLOGY & ADDITIONAL STUDIES:  rib fracture right 11 th   spleen 12 cm upper limit normal

## 2019-12-18 NOTE — DIETITIAN INITIAL EVALUATION ADULT. - ADD RECOMMEND
1. liberalize diet to low NA 2. add ensure enlive BID and gelatein 1x/day 3. encourage small frequent meals w/ adequate protein. 4. add MVI w/ minerals 5. weekly wt.

## 2019-12-18 NOTE — DISCHARGE NOTE PROVIDER - NSDCCPCAREPLAN_GEN_ALL_CORE_FT
PRINCIPAL DISCHARGE DIAGNOSIS  Diagnosis: Frequent falls  Assessment and Plan of Treatment: further evaluation  follow up with dr. snyder for nerve conduction studies        SECONDARY DISCHARGE DIAGNOSES  Diagnosis: Hypothyroid  Assessment and Plan of Treatment: treatment  take medication on its own. Do not eat or drink anything other than water 1 hour before and after taking medication  repeat TSH in 2-4 weeks    Diagnosis: Dehydration  Assessment and Plan of Treatment:     Diagnosis: Rib fracture  Assessment and Plan of Treatment:

## 2019-12-18 NOTE — DIETITIAN INITIAL EVALUATION ADULT. - NS FNS WEIGHT CHANGE REASON
Pt denies wt changes however pt reports # and current wt 132#. When discussed w/ pt he reports over the past month he lost weight however pt admitted to  5/2019 w/ wt of 140#. Based on wt hx in EMR pt appears to have been losing wt since 2018./unintentional

## 2019-12-18 NOTE — CHART NOTE - NSCHARTNOTEFT_GEN_A_CORE
Upon Nutritional Assessment by the Registered Dietitian your patient was determined to meet criteria / has evidence of the following diagnosis/diagnoses:          [ ]  Mild Protein Calorie Malnutrition        [ ]  Moderate Protein Calorie Malnutrition        [x ] Severe Protein Calorie Malnutrition        [ ] Unspecified Protein Calorie Malnutrition        [x ] Underweight / BMI <19        [ ] Morbid Obesity / BMI > 40      Findings:    Pt meets criteria for severe malnutrition in the context of chronic illness AEB severe muscle wasting, severe fat depletion, PO intake < 75% ENN      Findings as based on:  •  Comprehensive nutrition assessment and consultation  •  Calorie counts (nutrient intake analysis)  •  Food acceptance and intake status from observations by staff  •  Follow up  •  Patient education  •  Intervention secondary to interdisciplinary rounds  •   concerns      Treatment:      1. liberalize diet to low NA   2. add ensure enlive BID and gelatein 1x/day   3. encourage small frequent meals w/ adequate protein.   4. add MVI w/ minerals   5. weekly wt.      The following diet has been recommended:      PROVIDER Section:     By signing this assessment you are acknowledging and agree with the diagnosis/diagnoses assigned by the Registered Dietitian    Comments:

## 2019-12-18 NOTE — DIETITIAN INITIAL EVALUATION ADULT. - OTHER INFO
PT is a 85 yo M w/ PMH HTN, hypothyroidism, BPH, hair cell leukemia, p/w fall. Patient states he was walking with his walker and his legs got tangled up and he fell on his R side hitting his R lateral chest wall. Ln CT patient found to have possible non-displaced fracture of lateral aspect of R 11th rib. Patient states presently pain is well controlled. Patient also denies any symptoms before or after episode, and denies any LOC. Denies CP / palpitations / dizziness / lightheadedness. Patient states that he also felt everyday the two days prior as well, and each time patient states it was because he felt like his "legs tangled up". States that he only recently got the walker and he is still learning to use it properly. Patient states he also has occasional generalized weakness, but the weakness is mostly in his B/L arms, and denies weakness in his legs. Denies sensory deficits. Denies nausea, vomiting, abdominal pain, urinary complaints, unhealing wounds, rashes. Patient states he has been having some unsteady gait lately.    Upon visit pt lying in bed w/ no current c/o, states he had breakfast this AM of scrambled eggs, traylor, juice, and coffee. Pt denies chewing or swallowing difficulty. No reported c/o n/v/c/d at this time.

## 2019-12-18 NOTE — DIETITIAN INITIAL EVALUATION ADULT. - ENERGY INTAKE
As per pt he has had no significant changes in appetite/intake recently however pt appears to have been losing wt over the past 1.5 years. Dietary recall reveals pt consuming 3 meals/day: cereal w/ milk and juice for breakfast, soup/ sandwich for lunch, and eats at restaurant daily for dinner. Pt likely not meeting at least 75% ENN to maintain normal BW. Pt agreeable to ensure enlive BID and gelatein 1x/day Fair (>50%)

## 2019-12-18 NOTE — DIETITIAN INITIAL EVALUATION ADULT. - PHYSICAL APPEARANCE
underweight/other (specify)/BMI 18.0 NFPE performed and significant for muscle wasting: severe: temporal, clavicle, deltoid, interosseous, quad, patellar, calf regions; fat depletion: severe: occipital, buccal, tricep, ribs.   Tristan 17  no noted edema  noted stage 1 pressure ulcer on coccyx.

## 2019-12-18 NOTE — DISCHARGE NOTE PROVIDER - CARE PROVIDER_API CALL
Kristofer Winston)  Internal Medicine; Neurology  [address] [facility] [facility], [address]  [place] [address]  Phone: [phone]  Fax: [phone]  Follow Up Time:

## 2019-12-18 NOTE — PROGRESS NOTE ADULT - SUBJECTIVE AND OBJECTIVE BOX
c/c: fall    HPI:  85 y/o M with PMH of HTN, hypothyroidism, BPH, hairy cell leukemia, p/w fall. Patient states he was walking with his walker and his legs got tangled up and he fell on his R side hitting his R lateral chest wall. In CT patient found to have possible non-displaced fracture of lateral aspect of R 11th rib.  Patient states presently pain is well controlled. Patient also denies any symptoms before or after episode, and denies any LOC. Denies CP / palpitations / dizziness / lightheadedness. Patient states that he also felt everyday the two days prior as well, and each time patient states it was because he felt like his "legs tangled up". States that he only recently got the walker and he is still learning to use it properly. Patient states he also has occasional generalized weakness, but the weakness is mostly in his B/L arms, and denies weakness in his legs. Denies sensory deficits. Denies nausea, vomiting, abdominal pain, urinary complaints, unhealing wounds, rashes. Patient states he has been having some unsteady gait lately.     12/18: pt seen and examined. Felt ok. Doesn't want MARY. Wants to go home. No sob/chest pain/dizziness.      Review of system- All 10 systems reviewed and is as per HPI otherwise negative.       PHYSICAL EXAM:  Vital Signs Last 24 Hrs  T(C): 37.1 (18 Dec 2019 05:05), Max: 37.1 (18 Dec 2019 05:05)  T(F): 98.7 (18 Dec 2019 05:05), Max: 98.7 (18 Dec 2019 05:05)  HR: 68 (18 Dec 2019 05:05) (57 - 71)  BP: 121/57 (18 Dec 2019 05:05) (119/54 - 135/55)  RR: 16 (18 Dec 2019 05:05) (16 - 16)  SpO2: 98% (18 Dec 2019 05:05) (98% - 100%)      PHYSICAL EXAM:    GENERAL: Comfortable, no acute distress  HEAD:  Atraumatic, Normocephalic  EYES: EOMI, PERRLA  HEENT: Moist mucous membranes  NECK: Supple, No JVD  NERVOUS SYSTEM:  Alert & Oriented X3, non focal.   CHEST/LUNG: Right lateral chest wall tenderness  HEART: Regular rate and rhythm; No murmurs, rubs, or gallops  ABDOMEN: Soft, Nontender, Nondistended; Bowel sounds present  GENITOURINARY- Voiding, no palpable bladder  EXTREMITIES:  No clubbing, cyanosis, or edema  MUSCULOSKELETAL- FROM b/l knees  SKIN-no rash    LABS:                        10.6   5.92  )-----------( 102      ( 17 Dec 2019 06:41 )             32.7     MCV 68    17 Dec 2019 06:41    136    |  103    |  11     ----------------------------<  86     4.1     |  28     |  0.91     Ca    8.2        17 Dec 2019 06:41  Phos  2.7       17 Dec 2019 06:41  Mg     1.8       17 Dec 2019 06:41    TPro  6.5    /  Alb  3.1    /  TBili  1.5    /  DBili  x      /  AST  12     /  ALT  14     /  AlkPhos  46     17 Dec 2019 06:41          MEDS  acetaminophen   Tablet .. 650 milliGRAM(s) Oral every 4 hours PRN  heparin  Injectable 5000 Unit(s) SubCutaneous every 12 hours  lactulose Syrup 10 Gram(s) Oral daily PRN  levothyroxine 175 MICROGram(s) Oral daily  sodium chloride 0.9%. 1000 milliLiter(s) IV Continuous <Continuous>    ASSESSMENT AND PLAN:  86m, PMH as above a/w:    1.Fall/unsteady gait:  -neuro eval appreciated  -b12/folate adequate.  -tsh noted, but pt not taking synthroid appropriately.  -physical therapy eval  -outpt emg/ncv     2. Hypothyroid:  -continue current dose synthroid  -tsh noted, but pt was taking with food likely not having medication absorbed completely    3. Hairy cell leukemia:  -heme consult appreciated  -no indications for treatment at this time.   -outpt f/u in 6 mos.    4. Right rib fx:  -incentive spirometry  -pain control    5. BPH:  -not on meds  -observe for voiding difficulties.    6. DVT px    7. dispo:  dc planning

## 2019-12-19 VITALS
HEART RATE: 75 BPM | OXYGEN SATURATION: 100 % | RESPIRATION RATE: 16 BRPM | DIASTOLIC BLOOD PRESSURE: 65 MMHG | SYSTOLIC BLOOD PRESSURE: 136 MMHG | TEMPERATURE: 98 F

## 2019-12-19 RX ADMIN — Medication 175 MICROGRAM(S): at 05:25

## 2019-12-19 RX ADMIN — HEPARIN SODIUM 5000 UNIT(S): 5000 INJECTION INTRAVENOUS; SUBCUTANEOUS at 05:24

## 2019-12-19 NOTE — PROGRESS NOTE ADULT - SUBJECTIVE AND OBJECTIVE BOX
c/c: fall    HPI:  87 y/o M with PMH of HTN, hypothyroidism, BPH, hairy cell leukemia, p/w fall. Patient states he was walking with his walker and his legs got tangled up and he fell on his R side hitting his R lateral chest wall. In CT patient found to have possible non-displaced fracture of lateral aspect of R 11th rib.  Patient states presently pain is well controlled. Patient also denies any symptoms before or after episode, and denies any LOC. Denies CP / palpitations / dizziness / lightheadedness. Patient states that he also felt everyday the two days prior as well, and each time patient states it was because he felt like his "legs tangled up". States that he only recently got the walker and he is still learning to use it properly. Patient states he also has occasional generalized weakness, but the weakness is mostly in his B/L arms, and denies weakness in his legs. Denies sensory deficits. Denies nausea, vomiting, abdominal pain, urinary complaints, unhealing wounds, rashes. Patient states he has been having some unsteady gait lately.     12/19: pt seen and examined this am. c/o LLE pain around the knee. No swelling/erythema/warmth.      Review of system- All 10 systems reviewed and is as per HPI otherwise negative.     Vital Signs Last 24 Hrs  T(C): 36.7 (19 Dec 2019 11:27), Max: 36.8 (19 Dec 2019 00:11)  T(F): 98.1 (19 Dec 2019 11:27), Max: 98.3 (19 Dec 2019 00:11)  HR: 75 (19 Dec 2019 11:27) (65 - 75)  BP: 136/65 (19 Dec 2019 11:27) (115/51 - 136/65)  BP(mean): 79 (19 Dec 2019 11:27) (79 - 79)  RR: 16 (19 Dec 2019 11:27) (16 - 16)  SpO2: 100% (19 Dec 2019 11:27) (99% - 100%)  PHYSICAL EXAM:    GENERAL: Comfortable, no acute distress  HEAD:  Atraumatic, Normocephalic  EYES: EOMI, PERRLA  HEENT: Moist mucous membranes  NECK: Supple, No JVD  NERVOUS SYSTEM:  Alert & Oriented X3, non focal.   CHEST/LUNG: Right lateral chest wall tenderness  HEART: Regular rate and rhythm; No murmurs, rubs, or gallops  ABDOMEN: Soft, Nontender, Nondistended; Bowel sounds present  GENITOURINARY- Voiding, no palpable bladder  EXTREMITIES:  No clubbing, cyanosis, or edema  MUSCULOSKELETAL- FROM b/l knees  SKIN-no rash      LABS:      none today    CARDIAC MARKERS ( 17 Dec 2019 06:41 )  <0.015 ng/mL / x     / x     / x     / x      CARDIAC MARKERS ( 16 Dec 2019 15:37 )  <0.015 ng/mL / x     / x     / x     / x            MEDS  acetaminophen   Tablet .. 650 milliGRAM(s) Oral every 4 hours PRN  heparin  Injectable 5000 Unit(s) SubCutaneous every 12 hours  lactulose Syrup 10 Gram(s) Oral daily PRN  levothyroxine 175 MICROGram(s) Oral daily  sodium chloride 0.9%. 1000 milliLiter(s) IV Continuous <Continuous>    ASSESSMENT AND PLAN:  86m, PMH as above a/w:    1.Fall/unsteady gait:  -neuro eval appreciated  -b12/folate adequate.  -tsh noted, but pt not taking synthroid appropriately.  -physical therapy eval  -outpt emg/ncv     2. Hypothyroid:  -continue current dose synthroid  -tsh noted, but pt was taking with food likely not having medication absorbed completely    3. Hairy cell leukemia:  -heme consulted.     4. Right rib fx:  -incentive spirometry  -pain control    5. BPH:  -not on meds  -observe for voiding difficulties.    6. DVT px    7. dispo:  jacob today
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
## 2019-12-23 DIAGNOSIS — J90 PLEURAL EFFUSION, NOT ELSEWHERE CLASSIFIED: ICD-10-CM
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
## 2019-12-23 DIAGNOSIS — R29.6 REPEATED FALLS: ICD-10-CM

## 2019-12-23 DIAGNOSIS — R26.81 UNSTEADINESS ON FEET: ICD-10-CM

## 2019-12-23 DIAGNOSIS — N40.0 BENIGN PROSTATIC HYPERPLASIA WITHOUT LOWER URINARY TRACT SYMPTOMS: ICD-10-CM

## 2019-12-23 DIAGNOSIS — K59.00 CONSTIPATION, UNSPECIFIED: ICD-10-CM

## 2019-12-23 DIAGNOSIS — D69.6 THROMBOCYTOPENIA, UNSPECIFIED: ICD-10-CM

## 2019-12-23 DIAGNOSIS — S22.31XA FRACTURE OF ONE RIB, RIGHT SIDE, INITIAL ENCOUNTER FOR CLOSED FRACTURE: ICD-10-CM

## 2019-12-23 DIAGNOSIS — Q61.02 CONGENITAL MULTIPLE RENAL CYSTS: ICD-10-CM

## 2019-12-23 DIAGNOSIS — I10 ESSENTIAL (PRIMARY) HYPERTENSION: ICD-10-CM

## 2019-12-23 DIAGNOSIS — C91.40 HAIRY CELL LEUKEMIA NOT HAVING ACHIEVED REMISSION: ICD-10-CM

## 2019-12-23 DIAGNOSIS — E03.9 HYPOTHYROIDISM, UNSPECIFIED: ICD-10-CM

## 2019-12-23 DIAGNOSIS — G62.9 POLYNEUROPATHY, UNSPECIFIED: ICD-10-CM

## 2019-12-23 DIAGNOSIS — E43 UNSPECIFIED SEVERE PROTEIN-CALORIE MALNUTRITION: ICD-10-CM

## 2020-08-10 ENCOUNTER — EMERGENCY (EMERGENCY)
Facility: HOSPITAL | Age: 85
LOS: 0 days | Discharge: HOME | End: 2020-08-10
Attending: EMERGENCY MEDICINE
Payer: MEDICARE

## 2020-08-10 VITALS
SYSTOLIC BLOOD PRESSURE: 157 MMHG | RESPIRATION RATE: 18 BRPM | TEMPERATURE: 97 F | DIASTOLIC BLOOD PRESSURE: 62 MMHG | HEIGHT: 71 IN | WEIGHT: 149.91 LBS | OXYGEN SATURATION: 98 % | HEART RATE: 90 BPM

## 2020-08-10 VITALS
TEMPERATURE: 98 F | RESPIRATION RATE: 17 BRPM | DIASTOLIC BLOOD PRESSURE: 54 MMHG | HEART RATE: 74 BPM | SYSTOLIC BLOOD PRESSURE: 135 MMHG | OXYGEN SATURATION: 99 %

## 2020-08-10 DIAGNOSIS — I10 ESSENTIAL (PRIMARY) HYPERTENSION: ICD-10-CM

## 2020-08-10 DIAGNOSIS — F03.90 UNSPECIFIED DEMENTIA WITHOUT BEHAVIORAL DISTURBANCE: ICD-10-CM

## 2020-08-10 DIAGNOSIS — E03.9 HYPOTHYROIDISM, UNSPECIFIED: ICD-10-CM

## 2020-08-10 DIAGNOSIS — Z85.6 PERSONAL HISTORY OF LEUKEMIA: ICD-10-CM

## 2020-08-10 DIAGNOSIS — Z76.89 PERSONS ENCOUNTERING HEALTH SERVICES IN OTHER SPECIFIED CIRCUMSTANCES: ICD-10-CM

## 2020-08-10 DIAGNOSIS — R53.1 WEAKNESS: ICD-10-CM

## 2020-08-10 LAB
ALBUMIN SERPL ELPH-MCNC: 3.3 G/DL — SIGNIFICANT CHANGE UP (ref 3.3–5)
ALP SERPL-CCNC: 59 U/L — SIGNIFICANT CHANGE UP (ref 40–120)
ALT FLD-CCNC: 18 U/L — SIGNIFICANT CHANGE UP (ref 12–78)
ANION GAP SERPL CALC-SCNC: 6 MMOL/L — SIGNIFICANT CHANGE UP (ref 5–17)
AST SERPL-CCNC: 21 U/L — SIGNIFICANT CHANGE UP (ref 15–37)
BASOPHILS # BLD AUTO: 0.02 K/UL — SIGNIFICANT CHANGE UP (ref 0–0.2)
BASOPHILS NFR BLD AUTO: 0.4 % — SIGNIFICANT CHANGE UP (ref 0–2)
BILIRUB SERPL-MCNC: 1.2 MG/DL — SIGNIFICANT CHANGE UP (ref 0.2–1.2)
BUN SERPL-MCNC: 16 MG/DL — SIGNIFICANT CHANGE UP (ref 7–23)
CALCIUM SERPL-MCNC: 9 MG/DL — SIGNIFICANT CHANGE UP (ref 8.5–10.1)
CHLORIDE SERPL-SCNC: 106 MMOL/L — SIGNIFICANT CHANGE UP (ref 96–108)
CO2 SERPL-SCNC: 27 MMOL/L — SIGNIFICANT CHANGE UP (ref 22–31)
CREAT SERPL-MCNC: 0.78 MG/DL — SIGNIFICANT CHANGE UP (ref 0.5–1.3)
EOSINOPHIL # BLD AUTO: 0.24 K/UL — SIGNIFICANT CHANGE UP (ref 0–0.5)
EOSINOPHIL NFR BLD AUTO: 4.7 % — SIGNIFICANT CHANGE UP (ref 0–6)
GLUCOSE SERPL-MCNC: 109 MG/DL — HIGH (ref 70–99)
HCT VFR BLD CALC: 35.5 % — LOW (ref 39–50)
HGB BLD-MCNC: 11.3 G/DL — LOW (ref 13–17)
IMM GRANULOCYTES NFR BLD AUTO: 0.2 % — SIGNIFICANT CHANGE UP (ref 0–1.5)
LYMPHOCYTES # BLD AUTO: 0.89 K/UL — LOW (ref 1–3.3)
LYMPHOCYTES # BLD AUTO: 17.6 % — SIGNIFICANT CHANGE UP (ref 13–44)
MCHC RBC-ENTMCNC: 20 PG — LOW (ref 27–34)
MCHC RBC-ENTMCNC: 31.8 GM/DL — LOW (ref 32–36)
MCV RBC AUTO: 62.7 FL — LOW (ref 80–100)
MONOCYTES # BLD AUTO: 0.26 K/UL — SIGNIFICANT CHANGE UP (ref 0–0.9)
MONOCYTES NFR BLD AUTO: 5.1 % — SIGNIFICANT CHANGE UP (ref 2–14)
NEUTROPHILS # BLD AUTO: 3.64 K/UL — SIGNIFICANT CHANGE UP (ref 1.8–7.4)
NEUTROPHILS NFR BLD AUTO: 72 % — SIGNIFICANT CHANGE UP (ref 43–77)
PLATELET # BLD AUTO: 166 K/UL — SIGNIFICANT CHANGE UP (ref 150–400)
POTASSIUM SERPL-MCNC: 4.7 MMOL/L — SIGNIFICANT CHANGE UP (ref 3.5–5.3)
POTASSIUM SERPL-SCNC: 4.7 MMOL/L — SIGNIFICANT CHANGE UP (ref 3.5–5.3)
PROT SERPL-MCNC: 7.4 GM/DL — SIGNIFICANT CHANGE UP (ref 6–8.3)
RBC # BLD: 5.66 M/UL — SIGNIFICANT CHANGE UP (ref 4.2–5.8)
RBC # FLD: 17.3 % — HIGH (ref 10.3–14.5)
SARS-COV-2 RNA SPEC QL NAA+PROBE: SIGNIFICANT CHANGE UP
SODIUM SERPL-SCNC: 139 MMOL/L — SIGNIFICANT CHANGE UP (ref 135–145)
WBC # BLD: 5.06 K/UL — SIGNIFICANT CHANGE UP (ref 3.8–10.5)
WBC # FLD AUTO: 5.06 K/UL — SIGNIFICANT CHANGE UP (ref 3.8–10.5)

## 2020-08-10 PROCEDURE — 93010 ELECTROCARDIOGRAM REPORT: CPT | Mod: 76

## 2020-08-10 PROCEDURE — 93005 ELECTROCARDIOGRAM TRACING: CPT

## 2020-08-10 PROCEDURE — 80053 COMPREHEN METABOLIC PANEL: CPT

## 2020-08-10 PROCEDURE — 87635 SARS-COV-2 COVID-19 AMP PRB: CPT

## 2020-08-10 PROCEDURE — 36415 COLL VENOUS BLD VENIPUNCTURE: CPT

## 2020-08-10 PROCEDURE — 99283 EMERGENCY DEPT VISIT LOW MDM: CPT

## 2020-08-10 PROCEDURE — 71045 X-RAY EXAM CHEST 1 VIEW: CPT | Mod: 26

## 2020-08-10 PROCEDURE — 99283 EMERGENCY DEPT VISIT LOW MDM: CPT | Mod: 25

## 2020-08-10 PROCEDURE — 71045 X-RAY EXAM CHEST 1 VIEW: CPT

## 2020-08-10 PROCEDURE — 85730 THROMBOPLASTIN TIME PARTIAL: CPT

## 2020-08-10 PROCEDURE — 85025 COMPLETE CBC W/AUTO DIFF WBC: CPT

## 2020-08-10 PROCEDURE — 85610 PROTHROMBIN TIME: CPT

## 2020-08-10 NOTE — ED ADULT NURSE REASSESSMENT NOTE - NS ED NURSE REASSESS COMMENT FT1
spoke wth Yolette Chapa ( MSW) pt is refusing placement for himself and wife. They would like to go home. Pts aide service will call back with update on 24hr care on having an aide back at the house.

## 2020-08-10 NOTE — ED PROVIDER NOTE - PATIENT PORTAL LINK FT
You can access the FollowMyHealth Patient Portal offered by [facility] by registering at the following website: [email or web address]. By joining [facility]’s FollowMyHealth portal, you will also be able to view your health information using other applications (apps) compatible with our system.

## 2020-08-10 NOTE — ED PROVIDER NOTE - NSFOLLOWUPCLINICS_GEN_ALL_ED_FT
[facility]  Family Medicine  [address] [place] [address]  Phone: [phone]  Fax:   Follow Up Time:

## 2020-08-10 NOTE — ED ADULT NURSE NOTE - CHIEF COMPLAINT QUOTE
PT a/ox3, BIBEMS From home c/o generalized weakness x10 days, no other complaints at this time. pt denies fever, chills, N/V/D, CP/SOB. pt wife at bedside, brought to ED with pt because wife is has dementia and "can not be left alone". pt has been without power, reports "difficulty doing normal tasks due to no power"

## 2020-08-10 NOTE — ED ADULT NURSE NOTE - OBJECTIVE STATEMENT
pt presents from home after not having power for six days and feeling a little weak. 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 They came here for the sole purpose of staying somewhere until their electricity comes back on.

## 2020-08-10 NOTE — ED PROVIDER NOTE - CLINICAL SUMMARY MEDICAL DECISION MAKING FREE TEXT BOX
86M presents to ED for air conditioning, pt has no power at home and was told by his health care aide to come to ED. Denies any complaints. Will consult social work.

## 2020-08-10 NOTE — ED PROVIDER NOTE - OBJECTIVE STATEMENT
85 y/o M with PMHx of leukemia presents to ED with wife because there is no power at home. Pt states he was told by their home care aide to come to ED till they have power. Pt denies any complaints, states he is not sick.

## 2020-08-10 NOTE — ED ADULT TRIAGE NOTE - CHIEF COMPLAINT QUOTE
PT BIBEMS From home c/o generalized weakness x10 days, no other complaints at this time. pt denies fever,chills,N/V/D, CP/SOB. PT a/ox3, BIBEMS From home c/o generalized weakness x10 days, no other complaints at this time. pt denies fever, chills, N/V/D, CP/SOB. pt wife at bedside, brought to ED with pt because wife is has dementia and "can not be left alone". pt has been without power, reports "difficulty doing normal tasks due to no power"

## 2021-01-01 ENCOUNTER — TRANSCRIPTION ENCOUNTER (OUTPATIENT)
Age: 86
End: 2021-01-01

## 2021-01-01 ENCOUNTER — INPATIENT (INPATIENT)
Facility: HOSPITAL | Age: 86
LOS: 8 days | Discharge: HOME HEALTH | DRG: 871 | End: 2021-05-28
Attending: FAMILY MEDICINE | Admitting: FAMILY MEDICINE
Payer: MEDICARE

## 2021-01-01 ENCOUNTER — INPATIENT (INPATIENT)
Facility: HOSPITAL | Age: 86
LOS: 4 days | Discharge: NURSING, REHAB OR LONG-TERM CARE | DRG: 177 | End: 2021-06-03
Attending: INTERNAL MEDICINE | Admitting: HOSPITALIST
Payer: MEDICARE

## 2021-01-01 VITALS
TEMPERATURE: 98 F | RESPIRATION RATE: 20 BRPM | HEIGHT: 71 IN | WEIGHT: 130.07 LBS | DIASTOLIC BLOOD PRESSURE: 52 MMHG | SYSTOLIC BLOOD PRESSURE: 131 MMHG | OXYGEN SATURATION: 97 % | HEART RATE: 107 BPM

## 2021-01-01 VITALS
DIASTOLIC BLOOD PRESSURE: 55 MMHG | TEMPERATURE: 98 F | RESPIRATION RATE: 17 BRPM | SYSTOLIC BLOOD PRESSURE: 146 MMHG | OXYGEN SATURATION: 99 % | HEART RATE: 82 BPM

## 2021-01-01 VITALS
HEART RATE: 78 BPM | DIASTOLIC BLOOD PRESSURE: 59 MMHG | TEMPERATURE: 98 F | OXYGEN SATURATION: 98 % | SYSTOLIC BLOOD PRESSURE: 130 MMHG | RESPIRATION RATE: 18 BRPM

## 2021-01-01 VITALS
SYSTOLIC BLOOD PRESSURE: 155 MMHG | OXYGEN SATURATION: 93 % | TEMPERATURE: 99 F | HEART RATE: 98 BPM | WEIGHT: 145.06 LBS | RESPIRATION RATE: 16 BRPM | DIASTOLIC BLOOD PRESSURE: 73 MMHG | HEIGHT: 71 IN

## 2021-01-01 DIAGNOSIS — D64.9 ANEMIA, UNSPECIFIED: ICD-10-CM

## 2021-01-01 DIAGNOSIS — E87.2 ACIDOSIS: ICD-10-CM

## 2021-01-01 DIAGNOSIS — Z86.73 PERSONAL HISTORY OF TRANSIENT ISCHEMIC ATTACK (TIA), AND CEREBRAL INFARCTION WITHOUT RESIDUAL DEFICITS: ICD-10-CM

## 2021-01-01 DIAGNOSIS — E43 UNSPECIFIED SEVERE PROTEIN-CALORIE MALNUTRITION: ICD-10-CM

## 2021-01-01 DIAGNOSIS — J18.9 PNEUMONIA, UNSPECIFIED ORGANISM: ICD-10-CM

## 2021-01-01 DIAGNOSIS — Z90.79 ACQUIRED ABSENCE OF OTHER GENITAL ORGAN(S): Chronic | ICD-10-CM

## 2021-01-01 DIAGNOSIS — R62.7 ADULT FAILURE TO THRIVE: ICD-10-CM

## 2021-01-01 DIAGNOSIS — C91.40 HAIRY CELL LEUKEMIA NOT HAVING ACHIEVED REMISSION: ICD-10-CM

## 2021-01-01 DIAGNOSIS — R73.9 HYPERGLYCEMIA, UNSPECIFIED: ICD-10-CM

## 2021-01-01 DIAGNOSIS — Z90.89 ACQUIRED ABSENCE OF OTHER ORGANS: Chronic | ICD-10-CM

## 2021-01-01 DIAGNOSIS — N40.0 BENIGN PROSTATIC HYPERPLASIA WITHOUT LOWER URINARY TRACT SYMPTOMS: ICD-10-CM

## 2021-01-01 DIAGNOSIS — K21.9 GASTRO-ESOPHAGEAL REFLUX DISEASE WITHOUT ESOPHAGITIS: ICD-10-CM

## 2021-01-01 DIAGNOSIS — E89.0 POSTPROCEDURAL HYPOTHYROIDISM: ICD-10-CM

## 2021-01-01 DIAGNOSIS — E89.0 POSTPROCEDURAL HYPOTHYROIDISM: Chronic | ICD-10-CM

## 2021-01-01 DIAGNOSIS — F10.10 ALCOHOL ABUSE, UNCOMPLICATED: ICD-10-CM

## 2021-01-01 DIAGNOSIS — J69.0 PNEUMONITIS DUE TO INHALATION OF FOOD AND VOMIT: ICD-10-CM

## 2021-01-01 DIAGNOSIS — J96.00 ACUTE RESPIRATORY FAILURE, UNSPECIFIED WHETHER WITH HYPOXIA OR HYPERCAPNIA: ICD-10-CM

## 2021-01-01 DIAGNOSIS — F10.21 ALCOHOL DEPENDENCE, IN REMISSION: ICD-10-CM

## 2021-01-01 DIAGNOSIS — R13.10 DYSPHAGIA, UNSPECIFIED: ICD-10-CM

## 2021-01-01 DIAGNOSIS — F03.90 UNSPECIFIED DEMENTIA WITHOUT BEHAVIORAL DISTURBANCE: ICD-10-CM

## 2021-01-01 DIAGNOSIS — E03.9 HYPOTHYROIDISM, UNSPECIFIED: ICD-10-CM

## 2021-01-01 DIAGNOSIS — A41.9 SEPSIS, UNSPECIFIED ORGANISM: ICD-10-CM

## 2021-01-01 LAB
24R-OH-CALCIDIOL SERPL-MCNC: 18.1 NG/ML — LOW (ref 30–80)
A1C WITH ESTIMATED AVERAGE GLUCOSE RESULT: 5.2 % — SIGNIFICANT CHANGE UP (ref 4–5.6)
ALBUMIN SERPL ELPH-MCNC: 1.8 G/DL — LOW (ref 3.3–5)
ALBUMIN SERPL ELPH-MCNC: 1.8 G/DL — LOW (ref 3.3–5)
ALBUMIN SERPL ELPH-MCNC: 2.2 G/DL — LOW (ref 3.3–5)
ALP SERPL-CCNC: 81 U/L — SIGNIFICANT CHANGE UP (ref 40–120)
ALP SERPL-CCNC: 94 U/L — SIGNIFICANT CHANGE UP (ref 40–120)
ALP SERPL-CCNC: 97 U/L — SIGNIFICANT CHANGE UP (ref 40–120)
ALT FLD-CCNC: 22 U/L — SIGNIFICANT CHANGE UP (ref 12–78)
ALT FLD-CCNC: 31 U/L — SIGNIFICANT CHANGE UP (ref 12–78)
ALT FLD-CCNC: 46 U/L — SIGNIFICANT CHANGE UP (ref 12–78)
ANION GAP SERPL CALC-SCNC: 2 MMOL/L — LOW (ref 5–17)
ANION GAP SERPL CALC-SCNC: 4 MMOL/L — LOW (ref 5–17)
ANION GAP SERPL CALC-SCNC: 5 MMOL/L — SIGNIFICANT CHANGE UP (ref 5–17)
ANION GAP SERPL CALC-SCNC: 6 MMOL/L — SIGNIFICANT CHANGE UP (ref 5–17)
ANION GAP SERPL CALC-SCNC: 8 MMOL/L — SIGNIFICANT CHANGE UP (ref 5–17)
APTT BLD: 30.9 SEC — SIGNIFICANT CHANGE UP (ref 27.5–35.5)
AST SERPL-CCNC: 26 U/L — SIGNIFICANT CHANGE UP (ref 15–37)
AST SERPL-CCNC: 40 U/L — HIGH (ref 15–37)
AST SERPL-CCNC: 42 U/L — HIGH (ref 15–37)
BASOPHILS # BLD AUTO: 0 K/UL — SIGNIFICANT CHANGE UP (ref 0–0.2)
BASOPHILS # BLD AUTO: 0.02 K/UL — SIGNIFICANT CHANGE UP (ref 0–0.2)
BASOPHILS # BLD AUTO: 0.02 K/UL — SIGNIFICANT CHANGE UP (ref 0–0.2)
BASOPHILS # BLD AUTO: 0.03 K/UL — SIGNIFICANT CHANGE UP (ref 0–0.2)
BASOPHILS NFR BLD AUTO: 0 % — SIGNIFICANT CHANGE UP (ref 0–2)
BASOPHILS NFR BLD AUTO: 0.1 % — SIGNIFICANT CHANGE UP (ref 0–2)
BASOPHILS NFR BLD AUTO: 0.2 % — SIGNIFICANT CHANGE UP (ref 0–2)
BASOPHILS NFR BLD AUTO: 0.3 % — SIGNIFICANT CHANGE UP (ref 0–2)
BILIRUB SERPL-MCNC: 0.6 MG/DL — SIGNIFICANT CHANGE UP (ref 0.2–1.2)
BILIRUB SERPL-MCNC: 0.6 MG/DL — SIGNIFICANT CHANGE UP (ref 0.2–1.2)
BILIRUB SERPL-MCNC: 1.2 MG/DL — SIGNIFICANT CHANGE UP (ref 0.2–1.2)
BUN SERPL-MCNC: 11 MG/DL — SIGNIFICANT CHANGE UP (ref 7–23)
BUN SERPL-MCNC: 11 MG/DL — SIGNIFICANT CHANGE UP (ref 7–23)
BUN SERPL-MCNC: 12 MG/DL — SIGNIFICANT CHANGE UP (ref 7–23)
BUN SERPL-MCNC: 16 MG/DL — SIGNIFICANT CHANGE UP (ref 7–23)
BUN SERPL-MCNC: 23 MG/DL — SIGNIFICANT CHANGE UP (ref 7–23)
CALCIUM SERPL-MCNC: 7.5 MG/DL — LOW (ref 8.5–10.1)
CALCIUM SERPL-MCNC: 7.8 MG/DL — LOW (ref 8.5–10.1)
CALCIUM SERPL-MCNC: 7.9 MG/DL — LOW (ref 8.5–10.1)
CALCIUM SERPL-MCNC: 8.1 MG/DL — LOW (ref 8.5–10.1)
CALCIUM SERPL-MCNC: 8.2 MG/DL — LOW (ref 8.5–10.1)
CALCIUM SERPL-MCNC: 8.3 MG/DL — LOW (ref 8.5–10.1)
CALCIUM SERPL-MCNC: 8.7 MG/DL — SIGNIFICANT CHANGE UP (ref 8.5–10.1)
CHLORIDE SERPL-SCNC: 102 MMOL/L — SIGNIFICANT CHANGE UP (ref 96–108)
CHLORIDE SERPL-SCNC: 102 MMOL/L — SIGNIFICANT CHANGE UP (ref 96–108)
CHLORIDE SERPL-SCNC: 103 MMOL/L — SIGNIFICANT CHANGE UP (ref 96–108)
CHLORIDE SERPL-SCNC: 104 MMOL/L — SIGNIFICANT CHANGE UP (ref 96–108)
CHLORIDE SERPL-SCNC: 107 MMOL/L — SIGNIFICANT CHANGE UP (ref 96–108)
CO2 SERPL-SCNC: 27 MMOL/L — SIGNIFICANT CHANGE UP (ref 22–31)
CO2 SERPL-SCNC: 28 MMOL/L — SIGNIFICANT CHANGE UP (ref 22–31)
CO2 SERPL-SCNC: 29 MMOL/L — SIGNIFICANT CHANGE UP (ref 22–31)
CO2 SERPL-SCNC: 29 MMOL/L — SIGNIFICANT CHANGE UP (ref 22–31)
CO2 SERPL-SCNC: 30 MMOL/L — SIGNIFICANT CHANGE UP (ref 22–31)
COVID-19 SPIKE DOMAIN AB INTERP: NEGATIVE — SIGNIFICANT CHANGE UP
COVID-19 SPIKE DOMAIN AB INTERP: NEGATIVE — SIGNIFICANT CHANGE UP
COVID-19 SPIKE DOMAIN ANTIBODY RESULT: 0.4 U/ML — SIGNIFICANT CHANGE UP
COVID-19 SPIKE DOMAIN ANTIBODY RESULT: 0.4 U/ML — SIGNIFICANT CHANGE UP
CREAT SERPL-MCNC: 0.5 MG/DL — SIGNIFICANT CHANGE UP (ref 0.5–1.3)
CREAT SERPL-MCNC: 0.54 MG/DL — SIGNIFICANT CHANGE UP (ref 0.5–1.3)
CREAT SERPL-MCNC: 0.56 MG/DL — SIGNIFICANT CHANGE UP (ref 0.5–1.3)
CREAT SERPL-MCNC: 0.58 MG/DL — SIGNIFICANT CHANGE UP (ref 0.5–1.3)
CREAT SERPL-MCNC: 0.59 MG/DL — SIGNIFICANT CHANGE UP (ref 0.5–1.3)
CREAT SERPL-MCNC: 0.61 MG/DL — SIGNIFICANT CHANGE UP (ref 0.5–1.3)
CREAT SERPL-MCNC: 0.81 MG/DL — SIGNIFICANT CHANGE UP (ref 0.5–1.3)
CULTURE RESULTS: SIGNIFICANT CHANGE UP
EOSINOPHIL # BLD AUTO: 0 K/UL — SIGNIFICANT CHANGE UP (ref 0–0.5)
EOSINOPHIL # BLD AUTO: 0 K/UL — SIGNIFICANT CHANGE UP (ref 0–0.5)
EOSINOPHIL # BLD AUTO: 0.03 K/UL — SIGNIFICANT CHANGE UP (ref 0–0.5)
EOSINOPHIL # BLD AUTO: 0.05 K/UL — SIGNIFICANT CHANGE UP (ref 0–0.5)
EOSINOPHIL NFR BLD AUTO: 0 % — SIGNIFICANT CHANGE UP (ref 0–6)
EOSINOPHIL NFR BLD AUTO: 0 % — SIGNIFICANT CHANGE UP (ref 0–6)
EOSINOPHIL NFR BLD AUTO: 0.3 % — SIGNIFICANT CHANGE UP (ref 0–6)
EOSINOPHIL NFR BLD AUTO: 0.5 % — SIGNIFICANT CHANGE UP (ref 0–6)
ESTIMATED AVERAGE GLUCOSE: 103 MG/DL — SIGNIFICANT CHANGE UP (ref 68–114)
ETHANOL SERPL-MCNC: <10 MG/DL — SIGNIFICANT CHANGE UP (ref 0–10)
FOLATE SERPL-MCNC: 3.7 NG/ML — LOW
GLUCOSE SERPL-MCNC: 102 MG/DL — HIGH (ref 70–99)
GLUCOSE SERPL-MCNC: 106 MG/DL — HIGH (ref 70–99)
GLUCOSE SERPL-MCNC: 201 MG/DL — HIGH (ref 70–99)
GLUCOSE SERPL-MCNC: 76 MG/DL — SIGNIFICANT CHANGE UP (ref 70–99)
GLUCOSE SERPL-MCNC: 88 MG/DL — SIGNIFICANT CHANGE UP (ref 70–99)
GLUCOSE SERPL-MCNC: 89 MG/DL — SIGNIFICANT CHANGE UP (ref 70–99)
GLUCOSE SERPL-MCNC: 96 MG/DL — SIGNIFICANT CHANGE UP (ref 70–99)
HAV IGM SER-ACNC: SIGNIFICANT CHANGE UP
HBV CORE IGM SER-ACNC: SIGNIFICANT CHANGE UP
HBV SURFACE AG SER-ACNC: SIGNIFICANT CHANGE UP
HCT VFR BLD CALC: 26.5 % — LOW (ref 39–50)
HCT VFR BLD CALC: 27 % — LOW (ref 39–50)
HCT VFR BLD CALC: 27.3 % — LOW (ref 39–50)
HCT VFR BLD CALC: 27.5 % — LOW (ref 39–50)
HCT VFR BLD CALC: 27.6 % — LOW (ref 39–50)
HCT VFR BLD CALC: 28.3 % — LOW (ref 39–50)
HCT VFR BLD CALC: 29.3 % — LOW (ref 39–50)
HCT VFR BLD CALC: 31.1 % — LOW (ref 39–50)
HCT VFR BLD CALC: 32.1 % — LOW (ref 39–50)
HCT VFR BLD CALC: 34.1 % — LOW (ref 39–50)
HCV AB S/CO SERPL IA: 0.15 S/CO — SIGNIFICANT CHANGE UP (ref 0–0.99)
HCV AB SERPL-IMP: SIGNIFICANT CHANGE UP
HGB BLD-MCNC: 10 G/DL — LOW (ref 13–17)
HGB BLD-MCNC: 10.5 G/DL — LOW (ref 13–17)
HGB BLD-MCNC: 11.2 G/DL — LOW (ref 13–17)
HGB BLD-MCNC: 8.7 G/DL — LOW (ref 13–17)
HGB BLD-MCNC: 8.8 G/DL — LOW (ref 13–17)
HGB BLD-MCNC: 8.8 G/DL — LOW (ref 13–17)
HGB BLD-MCNC: 8.9 G/DL — LOW (ref 13–17)
HGB BLD-MCNC: 9 G/DL — LOW (ref 13–17)
HGB BLD-MCNC: 9.4 G/DL — LOW (ref 13–17)
HGB BLD-MCNC: 9.4 G/DL — LOW (ref 13–17)
IMM GRANULOCYTES NFR BLD AUTO: 0.7 % — SIGNIFICANT CHANGE UP (ref 0–1.5)
IMM GRANULOCYTES NFR BLD AUTO: 0.8 % — SIGNIFICANT CHANGE UP (ref 0–1.5)
IMM GRANULOCYTES NFR BLD AUTO: 0.9 % — SIGNIFICANT CHANGE UP (ref 0–1.5)
INR BLD: 1.38 RATIO — HIGH (ref 0.88–1.16)
LACTATE SERPL-SCNC: 2.5 MMOL/L — HIGH (ref 0.7–2)
LYMPHOCYTES # BLD AUTO: 0.62 K/UL — LOW (ref 1–3.3)
LYMPHOCYTES # BLD AUTO: 0.64 K/UL — LOW (ref 1–3.3)
LYMPHOCYTES # BLD AUTO: 0.65 K/UL — LOW (ref 1–3.3)
LYMPHOCYTES # BLD AUTO: 0.71 K/UL — LOW (ref 1–3.3)
LYMPHOCYTES # BLD AUTO: 4.1 % — LOW (ref 13–44)
LYMPHOCYTES # BLD AUTO: 5.7 % — LOW (ref 13–44)
LYMPHOCYTES # BLD AUTO: 6.6 % — LOW (ref 13–44)
LYMPHOCYTES # BLD AUTO: 8 % — LOW (ref 13–44)
MAGNESIUM SERPL-MCNC: 2 MG/DL — SIGNIFICANT CHANGE UP (ref 1.6–2.6)
MAGNESIUM SERPL-MCNC: 2.2 MG/DL — SIGNIFICANT CHANGE UP (ref 1.6–2.6)
MCHC RBC-ENTMCNC: 19.7 PG — LOW (ref 27–34)
MCHC RBC-ENTMCNC: 19.8 PG — LOW (ref 27–34)
MCHC RBC-ENTMCNC: 20 PG — LOW (ref 27–34)
MCHC RBC-ENTMCNC: 20.1 PG — LOW (ref 27–34)
MCHC RBC-ENTMCNC: 20.2 PG — LOW (ref 27–34)
MCHC RBC-ENTMCNC: 20.3 PG — LOW (ref 27–34)
MCHC RBC-ENTMCNC: 20.3 PG — LOW (ref 27–34)
MCHC RBC-ENTMCNC: 20.4 PG — LOW (ref 27–34)
MCHC RBC-ENTMCNC: 32 GM/DL — SIGNIFICANT CHANGE UP (ref 32–36)
MCHC RBC-ENTMCNC: 32.1 GM/DL — SIGNIFICANT CHANGE UP (ref 32–36)
MCHC RBC-ENTMCNC: 32.2 GM/DL — SIGNIFICANT CHANGE UP (ref 32–36)
MCHC RBC-ENTMCNC: 32.2 GM/DL — SIGNIFICANT CHANGE UP (ref 32–36)
MCHC RBC-ENTMCNC: 32.6 GM/DL — SIGNIFICANT CHANGE UP (ref 32–36)
MCHC RBC-ENTMCNC: 32.7 GM/DL — SIGNIFICANT CHANGE UP (ref 32–36)
MCHC RBC-ENTMCNC: 32.8 GM/DL — SIGNIFICANT CHANGE UP (ref 32–36)
MCHC RBC-ENTMCNC: 32.8 GM/DL — SIGNIFICANT CHANGE UP (ref 32–36)
MCHC RBC-ENTMCNC: 33 GM/DL — SIGNIFICANT CHANGE UP (ref 32–36)
MCHC RBC-ENTMCNC: 33.2 GM/DL — SIGNIFICANT CHANGE UP (ref 32–36)
MCV RBC AUTO: 61.1 FL — LOW (ref 80–100)
MCV RBC AUTO: 61.2 FL — LOW (ref 80–100)
MCV RBC AUTO: 61.3 FL — LOW (ref 80–100)
MCV RBC AUTO: 61.5 FL — LOW (ref 80–100)
MCV RBC AUTO: 61.6 FL — LOW (ref 80–100)
MCV RBC AUTO: 61.7 FL — LOW (ref 80–100)
MCV RBC AUTO: 61.8 FL — LOW (ref 80–100)
MCV RBC AUTO: 61.9 FL — LOW (ref 80–100)
MCV RBC AUTO: 61.9 FL — LOW (ref 80–100)
MCV RBC AUTO: 62.1 FL — LOW (ref 80–100)
MONOCYTES # BLD AUTO: 0.32 K/UL — SIGNIFICANT CHANGE UP (ref 0–0.9)
MONOCYTES # BLD AUTO: 0.47 K/UL — SIGNIFICANT CHANGE UP (ref 0–0.9)
MONOCYTES # BLD AUTO: 0.53 K/UL — SIGNIFICANT CHANGE UP (ref 0–0.9)
MONOCYTES # BLD AUTO: 0.81 K/UL — SIGNIFICANT CHANGE UP (ref 0–0.9)
MONOCYTES NFR BLD AUTO: 4 % — SIGNIFICANT CHANGE UP (ref 2–14)
MONOCYTES NFR BLD AUTO: 4.1 % — SIGNIFICANT CHANGE UP (ref 2–14)
MONOCYTES NFR BLD AUTO: 4.9 % — SIGNIFICANT CHANGE UP (ref 2–14)
MONOCYTES NFR BLD AUTO: 5.4 % — SIGNIFICANT CHANGE UP (ref 2–14)
NEUTROPHILS # BLD AUTO: 10.17 K/UL — HIGH (ref 1.8–7.4)
NEUTROPHILS # BLD AUTO: 13.56 K/UL — HIGH (ref 1.8–7.4)
NEUTROPHILS # BLD AUTO: 6.99 K/UL — SIGNIFICANT CHANGE UP (ref 1.8–7.4)
NEUTROPHILS # BLD AUTO: 9.43 K/UL — HIGH (ref 1.8–7.4)
NEUTROPHILS NFR BLD AUTO: 85 % — HIGH (ref 43–77)
NEUTROPHILS NFR BLD AUTO: 87 % — HIGH (ref 43–77)
NEUTROPHILS NFR BLD AUTO: 88.7 % — HIGH (ref 43–77)
NEUTROPHILS NFR BLD AUTO: 89.7 % — HIGH (ref 43–77)
NRBC # BLD: SIGNIFICANT CHANGE UP /100 WBCS (ref 0–0)
PHOSPHATE SERPL-MCNC: 2.7 MG/DL — SIGNIFICANT CHANGE UP (ref 2.5–4.5)
PLATELET # BLD AUTO: 243 K/UL — SIGNIFICANT CHANGE UP (ref 150–400)
PLATELET # BLD AUTO: 244 K/UL — SIGNIFICANT CHANGE UP (ref 150–400)
PLATELET # BLD AUTO: 262 K/UL — SIGNIFICANT CHANGE UP (ref 150–400)
PLATELET # BLD AUTO: 271 K/UL — SIGNIFICANT CHANGE UP (ref 150–400)
PLATELET # BLD AUTO: 292 K/UL — SIGNIFICANT CHANGE UP (ref 150–400)
PLATELET # BLD AUTO: 310 K/UL — SIGNIFICANT CHANGE UP (ref 150–400)
PLATELET # BLD AUTO: 331 K/UL — SIGNIFICANT CHANGE UP (ref 150–400)
PLATELET # BLD AUTO: 340 K/UL — SIGNIFICANT CHANGE UP (ref 150–400)
PLATELET # BLD AUTO: 342 K/UL — SIGNIFICANT CHANGE UP (ref 150–400)
PLATELET # BLD AUTO: 360 K/UL — SIGNIFICANT CHANGE UP (ref 150–400)
POTASSIUM SERPL-MCNC: 3.7 MMOL/L — SIGNIFICANT CHANGE UP (ref 3.5–5.3)
POTASSIUM SERPL-MCNC: 3.7 MMOL/L — SIGNIFICANT CHANGE UP (ref 3.5–5.3)
POTASSIUM SERPL-MCNC: 3.8 MMOL/L — SIGNIFICANT CHANGE UP (ref 3.5–5.3)
POTASSIUM SERPL-MCNC: 3.8 MMOL/L — SIGNIFICANT CHANGE UP (ref 3.5–5.3)
POTASSIUM SERPL-MCNC: 3.9 MMOL/L — SIGNIFICANT CHANGE UP (ref 3.5–5.3)
POTASSIUM SERPL-MCNC: 4.1 MMOL/L — SIGNIFICANT CHANGE UP (ref 3.5–5.3)
POTASSIUM SERPL-MCNC: 4.2 MMOL/L — SIGNIFICANT CHANGE UP (ref 3.5–5.3)
POTASSIUM SERPL-SCNC: 3.7 MMOL/L — SIGNIFICANT CHANGE UP (ref 3.5–5.3)
POTASSIUM SERPL-SCNC: 3.7 MMOL/L — SIGNIFICANT CHANGE UP (ref 3.5–5.3)
POTASSIUM SERPL-SCNC: 3.8 MMOL/L — SIGNIFICANT CHANGE UP (ref 3.5–5.3)
POTASSIUM SERPL-SCNC: 3.8 MMOL/L — SIGNIFICANT CHANGE UP (ref 3.5–5.3)
POTASSIUM SERPL-SCNC: 3.9 MMOL/L — SIGNIFICANT CHANGE UP (ref 3.5–5.3)
POTASSIUM SERPL-SCNC: 4.1 MMOL/L — SIGNIFICANT CHANGE UP (ref 3.5–5.3)
POTASSIUM SERPL-SCNC: 4.2 MMOL/L — SIGNIFICANT CHANGE UP (ref 3.5–5.3)
PROT SERPL-MCNC: 5.9 GM/DL — LOW (ref 6–8.3)
PROT SERPL-MCNC: 6.3 GM/DL — SIGNIFICANT CHANGE UP (ref 6–8.3)
PROT SERPL-MCNC: 6.5 GM/DL — SIGNIFICANT CHANGE UP (ref 6–8.3)
PROTHROM AB SERPL-ACNC: 15.9 SEC — HIGH (ref 10.6–13.6)
RAPID RVP RESULT: SIGNIFICANT CHANGE UP
RBC # BLD: 4.3 M/UL — SIGNIFICANT CHANGE UP (ref 4.2–5.8)
RBC # BLD: 4.37 M/UL — SIGNIFICANT CHANGE UP (ref 4.2–5.8)
RBC # BLD: 4.41 M/UL — SIGNIFICANT CHANGE UP (ref 4.2–5.8)
RBC # BLD: 4.44 M/UL — SIGNIFICANT CHANGE UP (ref 4.2–5.8)
RBC # BLD: 4.51 M/UL — SIGNIFICANT CHANGE UP (ref 4.2–5.8)
RBC # BLD: 4.63 M/UL — SIGNIFICANT CHANGE UP (ref 4.2–5.8)
RBC # BLD: 4.78 M/UL — SIGNIFICANT CHANGE UP (ref 4.2–5.8)
RBC # BLD: 5.01 M/UL — SIGNIFICANT CHANGE UP (ref 4.2–5.8)
RBC # BLD: 5.22 M/UL — SIGNIFICANT CHANGE UP (ref 4.2–5.8)
RBC # BLD: 5.53 M/UL — SIGNIFICANT CHANGE UP (ref 4.2–5.8)
RBC # FLD: 15 % — HIGH (ref 10.3–14.5)
RBC # FLD: 15.1 % — HIGH (ref 10.3–14.5)
RBC # FLD: 15.1 % — HIGH (ref 10.3–14.5)
RBC # FLD: 15.2 % — HIGH (ref 10.3–14.5)
RBC # FLD: 15.3 % — HIGH (ref 10.3–14.5)
RBC # FLD: 15.9 % — HIGH (ref 10.3–14.5)
RBC # FLD: 16 % — HIGH (ref 10.3–14.5)
RBC # FLD: 16 % — HIGH (ref 10.3–14.5)
RBC # FLD: 16.1 % — HIGH (ref 10.3–14.5)
RBC # FLD: 16.2 % — HIGH (ref 10.3–14.5)
SARS-COV-2 IGG+IGM SERPL QL IA: 0.4 U/ML — SIGNIFICANT CHANGE UP
SARS-COV-2 IGG+IGM SERPL QL IA: 0.4 U/ML — SIGNIFICANT CHANGE UP
SARS-COV-2 IGG+IGM SERPL QL IA: NEGATIVE — SIGNIFICANT CHANGE UP
SARS-COV-2 IGG+IGM SERPL QL IA: NEGATIVE — SIGNIFICANT CHANGE UP
SARS-COV-2 RNA SPEC QL NAA+PROBE: SIGNIFICANT CHANGE UP
SARS-COV-2 RNA SPEC QL NAA+PROBE: SIGNIFICANT CHANGE UP
SODIUM SERPL-SCNC: 135 MMOL/L — SIGNIFICANT CHANGE UP (ref 135–145)
SODIUM SERPL-SCNC: 136 MMOL/L — SIGNIFICANT CHANGE UP (ref 135–145)
SODIUM SERPL-SCNC: 138 MMOL/L — SIGNIFICANT CHANGE UP (ref 135–145)
SODIUM SERPL-SCNC: 138 MMOL/L — SIGNIFICANT CHANGE UP (ref 135–145)
SODIUM SERPL-SCNC: 139 MMOL/L — SIGNIFICANT CHANGE UP (ref 135–145)
SPECIMEN SOURCE: SIGNIFICANT CHANGE UP
TROPONIN I SERPL-MCNC: 0.03 NG/ML — SIGNIFICANT CHANGE UP (ref 0.01–0.04)
VIT B12 SERPL-MCNC: 1200 PG/ML — SIGNIFICANT CHANGE UP (ref 232–1245)
WBC # BLD: 10.83 K/UL — HIGH (ref 3.8–10.5)
WBC # BLD: 11.19 K/UL — HIGH (ref 3.8–10.5)
WBC # BLD: 11.45 K/UL — HIGH (ref 3.8–10.5)
WBC # BLD: 14.58 K/UL — HIGH (ref 3.8–10.5)
WBC # BLD: 15.11 K/UL — HIGH (ref 3.8–10.5)
WBC # BLD: 8.04 K/UL — SIGNIFICANT CHANGE UP (ref 3.8–10.5)
WBC # BLD: 8.22 K/UL — SIGNIFICANT CHANGE UP (ref 3.8–10.5)
WBC # BLD: 9.46 K/UL — SIGNIFICANT CHANGE UP (ref 3.8–10.5)
WBC # BLD: 9.74 K/UL — SIGNIFICANT CHANGE UP (ref 3.8–10.5)
WBC # BLD: 9.89 K/UL — SIGNIFICANT CHANGE UP (ref 3.8–10.5)
WBC # FLD AUTO: 10.83 K/UL — HIGH (ref 3.8–10.5)
WBC # FLD AUTO: 11.19 K/UL — HIGH (ref 3.8–10.5)
WBC # FLD AUTO: 11.45 K/UL — HIGH (ref 3.8–10.5)
WBC # FLD AUTO: 14.58 K/UL — HIGH (ref 3.8–10.5)
WBC # FLD AUTO: 15.11 K/UL — HIGH (ref 3.8–10.5)
WBC # FLD AUTO: 8.04 K/UL — SIGNIFICANT CHANGE UP (ref 3.8–10.5)
WBC # FLD AUTO: 8.22 K/UL — SIGNIFICANT CHANGE UP (ref 3.8–10.5)
WBC # FLD AUTO: 9.46 K/UL — SIGNIFICANT CHANGE UP (ref 3.8–10.5)
WBC # FLD AUTO: 9.74 K/UL — SIGNIFICANT CHANGE UP (ref 3.8–10.5)
WBC # FLD AUTO: 9.89 K/UL — SIGNIFICANT CHANGE UP (ref 3.8–10.5)

## 2021-01-01 PROCEDURE — 80048 BASIC METABOLIC PNL TOTAL CA: CPT

## 2021-01-01 PROCEDURE — 99232 SBSQ HOSP IP/OBS MODERATE 35: CPT

## 2021-01-01 PROCEDURE — 70450 CT HEAD/BRAIN W/O DYE: CPT

## 2021-01-01 PROCEDURE — 99239 HOSP IP/OBS DSCHRG MGMT >30: CPT

## 2021-01-01 PROCEDURE — 80053 COMPREHEN METABOLIC PANEL: CPT

## 2021-01-01 PROCEDURE — 99497 ADVNCD CARE PLAN 30 MIN: CPT | Mod: 25

## 2021-01-01 PROCEDURE — 85027 COMPLETE CBC AUTOMATED: CPT

## 2021-01-01 PROCEDURE — 92523 SPEECH SOUND LANG COMPREHEN: CPT | Mod: GN

## 2021-01-01 PROCEDURE — 99285 EMERGENCY DEPT VISIT HI MDM: CPT | Mod: CS

## 2021-01-01 PROCEDURE — 86769 SARS-COV-2 COVID-19 ANTIBODY: CPT

## 2021-01-01 PROCEDURE — 80307 DRUG TEST PRSMV CHEM ANLYZR: CPT

## 2021-01-01 PROCEDURE — 99222 1ST HOSP IP/OBS MODERATE 55: CPT

## 2021-01-01 PROCEDURE — 76705 ECHO EXAM OF ABDOMEN: CPT | Mod: 26

## 2021-01-01 PROCEDURE — 92610 EVALUATE SWALLOWING FUNCTION: CPT | Mod: GN

## 2021-01-01 PROCEDURE — 99223 1ST HOSP IP/OBS HIGH 75: CPT

## 2021-01-01 PROCEDURE — 36415 COLL VENOUS BLD VENIPUNCTURE: CPT

## 2021-01-01 PROCEDURE — 99233 SBSQ HOSP IP/OBS HIGH 50: CPT

## 2021-01-01 PROCEDURE — 83036 HEMOGLOBIN GLYCOSYLATED A1C: CPT

## 2021-01-01 PROCEDURE — 97530 THERAPEUTIC ACTIVITIES: CPT | Mod: GP

## 2021-01-01 PROCEDURE — 97163 PT EVAL HIGH COMPLEX 45 MIN: CPT | Mod: GP

## 2021-01-01 PROCEDURE — 80074 ACUTE HEPATITIS PANEL: CPT

## 2021-01-01 PROCEDURE — 97116 GAIT TRAINING THERAPY: CPT | Mod: GP

## 2021-01-01 PROCEDURE — 99497 ADVNCD CARE PLAN 30 MIN: CPT

## 2021-01-01 PROCEDURE — 99233 SBSQ HOSP IP/OBS HIGH 50: CPT | Mod: 25

## 2021-01-01 PROCEDURE — 70450 CT HEAD/BRAIN W/O DYE: CPT | Mod: 26

## 2021-01-01 PROCEDURE — 93010 ELECTROCARDIOGRAM REPORT: CPT

## 2021-01-01 PROCEDURE — 92507 TX SP LANG VOICE COMM INDIV: CPT | Mod: GN

## 2021-01-01 PROCEDURE — 82746 ASSAY OF FOLIC ACID SERUM: CPT

## 2021-01-01 PROCEDURE — 82306 VITAMIN D 25 HYDROXY: CPT

## 2021-01-01 PROCEDURE — 85025 COMPLETE CBC W/AUTO DIFF WBC: CPT

## 2021-01-01 PROCEDURE — 83735 ASSAY OF MAGNESIUM: CPT

## 2021-01-01 PROCEDURE — 71045 X-RAY EXAM CHEST 1 VIEW: CPT

## 2021-01-01 PROCEDURE — 71045 X-RAY EXAM CHEST 1 VIEW: CPT | Mod: 26

## 2021-01-01 PROCEDURE — 83605 ASSAY OF LACTIC ACID: CPT

## 2021-01-01 PROCEDURE — 76705 ECHO EXAM OF ABDOMEN: CPT

## 2021-01-01 PROCEDURE — 82962 GLUCOSE BLOOD TEST: CPT

## 2021-01-01 PROCEDURE — 82607 VITAMIN B-12: CPT

## 2021-01-01 PROCEDURE — 84100 ASSAY OF PHOSPHORUS: CPT

## 2021-01-01 PROCEDURE — 99498 ADVNCD CARE PLAN ADDL 30 MIN: CPT

## 2021-01-01 PROCEDURE — 92526 ORAL FUNCTION THERAPY: CPT | Mod: GN

## 2021-01-01 RX ORDER — LEVOTHYROXINE SODIUM 125 MCG
1 TABLET ORAL
Qty: 0 | Refills: 0 | DISCHARGE
Start: 2021-01-01

## 2021-01-01 RX ORDER — ERGOCALCIFEROL 1.25 MG/1
50000 CAPSULE ORAL ONCE
Refills: 0 | Status: COMPLETED | OUTPATIENT
Start: 2021-01-01 | End: 2021-01-01

## 2021-01-01 RX ORDER — LEVOTHYROXINE SODIUM 125 MCG
175 TABLET ORAL DAILY
Refills: 0 | Status: DISCONTINUED | OUTPATIENT
Start: 2021-01-01 | End: 2021-01-01

## 2021-01-01 RX ORDER — DEXTROSE 50 % IN WATER 50 %
12.5 SYRINGE (ML) INTRAVENOUS ONCE
Refills: 0 | Status: DISCONTINUED | OUTPATIENT
Start: 2021-01-01 | End: 2021-01-01

## 2021-01-01 RX ORDER — ONDANSETRON 8 MG/1
4 TABLET, FILM COATED ORAL EVERY 6 HOURS
Refills: 0 | Status: DISCONTINUED | OUTPATIENT
Start: 2021-01-01 | End: 2021-01-01

## 2021-01-01 RX ORDER — GLUCAGON INJECTION, SOLUTION 0.5 MG/.1ML
1 INJECTION, SOLUTION SUBCUTANEOUS ONCE
Refills: 0 | Status: DISCONTINUED | OUTPATIENT
Start: 2021-01-01 | End: 2021-01-01

## 2021-01-01 RX ORDER — AZITHROMYCIN 500 MG/1
500 TABLET, FILM COATED ORAL ONCE
Refills: 0 | Status: COMPLETED | OUTPATIENT
Start: 2021-01-01 | End: 2021-01-01

## 2021-01-01 RX ORDER — FOLIC ACID 0.8 MG
1 TABLET ORAL DAILY
Refills: 0 | Status: DISCONTINUED | OUTPATIENT
Start: 2021-01-01 | End: 2021-01-01

## 2021-01-01 RX ORDER — DEXTROSE 50 % IN WATER 50 %
15 SYRINGE (ML) INTRAVENOUS ONCE
Refills: 0 | Status: DISCONTINUED | OUTPATIENT
Start: 2021-01-01 | End: 2021-01-01

## 2021-01-01 RX ORDER — ENOXAPARIN SODIUM 100 MG/ML
40 INJECTION SUBCUTANEOUS DAILY
Refills: 0 | Status: DISCONTINUED | OUTPATIENT
Start: 2021-01-01 | End: 2021-01-01

## 2021-01-01 RX ORDER — TRAMADOL HYDROCHLORIDE 50 MG/1
0.5 TABLET ORAL
Qty: 14 | Refills: 0
Start: 2021-01-01 | End: 2021-01-01

## 2021-01-01 RX ORDER — TRAMADOL HYDROCHLORIDE 50 MG/1
25 TABLET ORAL EVERY 6 HOURS
Refills: 0 | Status: DISCONTINUED | OUTPATIENT
Start: 2021-01-01 | End: 2021-01-01

## 2021-01-01 RX ORDER — AZITHROMYCIN 500 MG/1
500 TABLET, FILM COATED ORAL EVERY 24 HOURS
Refills: 0 | Status: DISCONTINUED | OUTPATIENT
Start: 2021-01-01 | End: 2021-01-01

## 2021-01-01 RX ORDER — DEXTROSE 50 % IN WATER 50 %
25 SYRINGE (ML) INTRAVENOUS ONCE
Refills: 0 | Status: DISCONTINUED | OUTPATIENT
Start: 2021-01-01 | End: 2021-01-01

## 2021-01-01 RX ORDER — SODIUM CHLORIDE 9 MG/ML
1000 INJECTION INTRAMUSCULAR; INTRAVENOUS; SUBCUTANEOUS ONCE
Refills: 0 | Status: COMPLETED | OUTPATIENT
Start: 2021-01-01 | End: 2021-01-01

## 2021-01-01 RX ORDER — MIRTAZAPINE 45 MG/1
7.5 TABLET, ORALLY DISINTEGRATING ORAL DAILY
Refills: 0 | Status: DISCONTINUED | OUTPATIENT
Start: 2021-01-01 | End: 2021-01-01

## 2021-01-01 RX ORDER — CEFTRIAXONE 500 MG/1
1000 INJECTION, POWDER, FOR SOLUTION INTRAMUSCULAR; INTRAVENOUS ONCE
Refills: 0 | Status: COMPLETED | OUTPATIENT
Start: 2021-01-01 | End: 2021-01-01

## 2021-01-01 RX ORDER — SODIUM CHLORIDE 9 MG/ML
1000 INJECTION INTRAMUSCULAR; INTRAVENOUS; SUBCUTANEOUS
Refills: 0 | Status: DISCONTINUED | OUTPATIENT
Start: 2021-01-01 | End: 2021-01-01

## 2021-01-01 RX ORDER — CEFUROXIME AXETIL 250 MG
500 TABLET ORAL EVERY 12 HOURS
Refills: 0 | Status: COMPLETED | OUTPATIENT
Start: 2021-01-01 | End: 2021-01-01

## 2021-01-01 RX ORDER — TRAMADOL HYDROCHLORIDE 50 MG/1
0.5 TABLET ORAL
Qty: 0 | Refills: 0 | DISCHARGE
Start: 2021-01-01

## 2021-01-01 RX ORDER — ACETAMINOPHEN 500 MG
2 TABLET ORAL
Qty: 0 | Refills: 0 | DISCHARGE
Start: 2021-01-01

## 2021-01-01 RX ORDER — PIPERACILLIN AND TAZOBACTAM 4; .5 G/20ML; G/20ML
3.38 INJECTION, POWDER, LYOPHILIZED, FOR SOLUTION INTRAVENOUS EVERY 8 HOURS
Refills: 0 | Status: DISCONTINUED | OUTPATIENT
Start: 2021-01-01 | End: 2021-01-01

## 2021-01-01 RX ORDER — INSULIN LISPRO 100/ML
VIAL (ML) SUBCUTANEOUS AT BEDTIME
Refills: 0 | Status: DISCONTINUED | OUTPATIENT
Start: 2021-01-01 | End: 2021-01-01

## 2021-01-01 RX ORDER — FOLIC ACID 0.8 MG
1 TABLET ORAL
Qty: 0 | Refills: 0 | DISCHARGE
Start: 2021-01-01

## 2021-01-01 RX ORDER — FOLIC ACID 0.8 MG
1 TABLET ORAL
Qty: 30 | Refills: 0
Start: 2021-01-01 | End: 2021-01-01

## 2021-01-01 RX ORDER — MIRTAZAPINE 45 MG/1
1 TABLET, ORALLY DISINTEGRATING ORAL
Qty: 30 | Refills: 0
Start: 2021-01-01 | End: 2021-01-01

## 2021-01-01 RX ORDER — ACETAMINOPHEN 500 MG
650 TABLET ORAL EVERY 6 HOURS
Refills: 0 | Status: DISCONTINUED | OUTPATIENT
Start: 2021-01-01 | End: 2021-01-01

## 2021-01-01 RX ORDER — MIRTAZAPINE 45 MG/1
1 TABLET, ORALLY DISINTEGRATING ORAL
Qty: 0 | Refills: 0 | DISCHARGE
Start: 2021-01-01

## 2021-01-01 RX ORDER — THIAMINE MONONITRATE (VIT B1) 100 MG
100 TABLET ORAL DAILY
Refills: 0 | Status: COMPLETED | OUTPATIENT
Start: 2021-01-01 | End: 2021-01-01

## 2021-01-01 RX ORDER — CEFTRIAXONE 500 MG/1
1000 INJECTION, POWDER, FOR SOLUTION INTRAMUSCULAR; INTRAVENOUS EVERY 24 HOURS
Refills: 0 | Status: COMPLETED | OUTPATIENT
Start: 2021-01-01 | End: 2021-01-01

## 2021-01-01 RX ORDER — PIPERACILLIN AND TAZOBACTAM 4; .5 G/20ML; G/20ML
3.38 INJECTION, POWDER, LYOPHILIZED, FOR SOLUTION INTRAVENOUS ONCE
Refills: 0 | Status: COMPLETED | OUTPATIENT
Start: 2021-01-01 | End: 2021-01-01

## 2021-01-01 RX ORDER — OXYCODONE HYDROCHLORIDE 5 MG/1
5 TABLET ORAL THREE TIMES A DAY
Refills: 0 | Status: DISCONTINUED | OUTPATIENT
Start: 2021-01-01 | End: 2021-01-01

## 2021-01-01 RX ORDER — INSULIN LISPRO 100/ML
VIAL (ML) SUBCUTANEOUS
Refills: 0 | Status: DISCONTINUED | OUTPATIENT
Start: 2021-01-01 | End: 2021-01-01

## 2021-01-01 RX ORDER — MIRTAZAPINE 45 MG/1
7.5 TABLET, ORALLY DISINTEGRATING ORAL AT BEDTIME
Refills: 0 | Status: DISCONTINUED | OUTPATIENT
Start: 2021-01-01 | End: 2021-01-01

## 2021-01-01 RX ORDER — CEFTRIAXONE 500 MG/1
1000 INJECTION, POWDER, FOR SOLUTION INTRAMUSCULAR; INTRAVENOUS ONCE
Refills: 0 | Status: DISCONTINUED | OUTPATIENT
Start: 2021-01-01 | End: 2021-01-01

## 2021-01-01 RX ADMIN — Medication 1 MILLIGRAM(S): at 09:56

## 2021-01-01 RX ADMIN — AZITHROMYCIN 255 MILLIGRAM(S): 500 TABLET, FILM COATED ORAL at 17:59

## 2021-01-01 RX ADMIN — Medication 650 MILLIGRAM(S): at 13:00

## 2021-01-01 RX ADMIN — Medication 175 MICROGRAM(S): at 05:41

## 2021-01-01 RX ADMIN — Medication 175 MICROGRAM(S): at 06:12

## 2021-01-01 RX ADMIN — Medication 1 MILLIGRAM(S): at 10:19

## 2021-01-01 RX ADMIN — TRAMADOL HYDROCHLORIDE 25 MILLIGRAM(S): 50 TABLET ORAL at 10:12

## 2021-01-01 RX ADMIN — Medication 1 TABLET(S): at 10:19

## 2021-01-01 RX ADMIN — PIPERACILLIN AND TAZOBACTAM 25 GRAM(S): 4; .5 INJECTION, POWDER, LYOPHILIZED, FOR SOLUTION INTRAVENOUS at 05:17

## 2021-01-01 RX ADMIN — AZITHROMYCIN 255 MILLIGRAM(S): 500 TABLET, FILM COATED ORAL at 17:14

## 2021-01-01 RX ADMIN — TRAMADOL HYDROCHLORIDE 25 MILLIGRAM(S): 50 TABLET ORAL at 06:01

## 2021-01-01 RX ADMIN — PIPERACILLIN AND TAZOBACTAM 25 GRAM(S): 4; .5 INJECTION, POWDER, LYOPHILIZED, FOR SOLUTION INTRAVENOUS at 21:13

## 2021-01-01 RX ADMIN — Medication 650 MILLIGRAM(S): at 11:25

## 2021-01-01 RX ADMIN — Medication 650 MILLIGRAM(S): at 12:30

## 2021-01-01 RX ADMIN — Medication 1 MILLIGRAM(S): at 09:25

## 2021-01-01 RX ADMIN — TRAMADOL HYDROCHLORIDE 25 MILLIGRAM(S): 50 TABLET ORAL at 05:18

## 2021-01-01 RX ADMIN — CEFTRIAXONE 1000 MILLIGRAM(S): 500 INJECTION, POWDER, FOR SOLUTION INTRAMUSCULAR; INTRAVENOUS at 18:10

## 2021-01-01 RX ADMIN — Medication 500 MILLIGRAM(S): at 09:42

## 2021-01-01 RX ADMIN — Medication 1 MILLIGRAM(S): at 09:44

## 2021-01-01 RX ADMIN — Medication 650 MILLIGRAM(S): at 17:20

## 2021-01-01 RX ADMIN — TRAMADOL HYDROCHLORIDE 25 MILLIGRAM(S): 50 TABLET ORAL at 05:53

## 2021-01-01 RX ADMIN — Medication 650 MILLIGRAM(S): at 17:42

## 2021-01-01 RX ADMIN — ENOXAPARIN SODIUM 40 MILLIGRAM(S): 100 INJECTION SUBCUTANEOUS at 10:28

## 2021-01-01 RX ADMIN — CEFTRIAXONE 1000 MILLIGRAM(S): 500 INJECTION, POWDER, FOR SOLUTION INTRAMUSCULAR; INTRAVENOUS at 16:58

## 2021-01-01 RX ADMIN — OXYCODONE HYDROCHLORIDE 5 MILLIGRAM(S): 5 TABLET ORAL at 06:14

## 2021-01-01 RX ADMIN — PIPERACILLIN AND TAZOBACTAM 25 GRAM(S): 4; .5 INJECTION, POWDER, LYOPHILIZED, FOR SOLUTION INTRAVENOUS at 13:35

## 2021-01-01 RX ADMIN — MIRTAZAPINE 7.5 MILLIGRAM(S): 45 TABLET, ORALLY DISINTEGRATING ORAL at 10:28

## 2021-01-01 RX ADMIN — TRAMADOL HYDROCHLORIDE 25 MILLIGRAM(S): 50 TABLET ORAL at 05:19

## 2021-01-01 RX ADMIN — PIPERACILLIN AND TAZOBACTAM 25 GRAM(S): 4; .5 INJECTION, POWDER, LYOPHILIZED, FOR SOLUTION INTRAVENOUS at 21:15

## 2021-01-01 RX ADMIN — PIPERACILLIN AND TAZOBACTAM 25 GRAM(S): 4; .5 INJECTION, POWDER, LYOPHILIZED, FOR SOLUTION INTRAVENOUS at 14:24

## 2021-01-01 RX ADMIN — TRAMADOL HYDROCHLORIDE 25 MILLIGRAM(S): 50 TABLET ORAL at 23:49

## 2021-01-01 RX ADMIN — Medication 500 MILLIGRAM(S): at 20:56

## 2021-01-01 RX ADMIN — MIRTAZAPINE 7.5 MILLIGRAM(S): 45 TABLET, ORALLY DISINTEGRATING ORAL at 21:14

## 2021-01-01 RX ADMIN — MIRTAZAPINE 7.5 MILLIGRAM(S): 45 TABLET, ORALLY DISINTEGRATING ORAL at 10:11

## 2021-01-01 RX ADMIN — SODIUM CHLORIDE 70 MILLILITER(S): 9 INJECTION INTRAMUSCULAR; INTRAVENOUS; SUBCUTANEOUS at 19:50

## 2021-01-01 RX ADMIN — SODIUM CHLORIDE 1000 MILLILITER(S): 9 INJECTION INTRAMUSCULAR; INTRAVENOUS; SUBCUTANEOUS at 17:23

## 2021-01-01 RX ADMIN — OXYCODONE HYDROCHLORIDE 5 MILLIGRAM(S): 5 TABLET ORAL at 15:17

## 2021-01-01 RX ADMIN — Medication 1 MILLIGRAM(S): at 09:22

## 2021-01-01 RX ADMIN — Medication 650 MILLIGRAM(S): at 23:49

## 2021-01-01 RX ADMIN — SODIUM CHLORIDE 70 MILLILITER(S): 9 INJECTION INTRAMUSCULAR; INTRAVENOUS; SUBCUTANEOUS at 21:00

## 2021-01-01 RX ADMIN — Medication 1 TABLET(S): at 09:56

## 2021-01-01 RX ADMIN — Medication 1 TABLET(S): at 10:02

## 2021-01-01 RX ADMIN — Medication 175 MICROGRAM(S): at 05:09

## 2021-01-01 RX ADMIN — ENOXAPARIN SODIUM 40 MILLIGRAM(S): 100 INJECTION SUBCUTANEOUS at 23:53

## 2021-01-01 RX ADMIN — Medication 1 TABLET(S): at 09:47

## 2021-01-01 RX ADMIN — Medication 650 MILLIGRAM(S): at 05:18

## 2021-01-01 RX ADMIN — AZITHROMYCIN 255 MILLIGRAM(S): 500 TABLET, FILM COATED ORAL at 16:58

## 2021-01-01 RX ADMIN — AZITHROMYCIN 255 MILLIGRAM(S): 500 TABLET, FILM COATED ORAL at 16:10

## 2021-01-01 RX ADMIN — MIRTAZAPINE 7.5 MILLIGRAM(S): 45 TABLET, ORALLY DISINTEGRATING ORAL at 09:56

## 2021-01-01 RX ADMIN — PIPERACILLIN AND TAZOBACTAM 200 GRAM(S): 4; .5 INJECTION, POWDER, LYOPHILIZED, FOR SOLUTION INTRAVENOUS at 20:10

## 2021-01-01 RX ADMIN — PIPERACILLIN AND TAZOBACTAM 25 GRAM(S): 4; .5 INJECTION, POWDER, LYOPHILIZED, FOR SOLUTION INTRAVENOUS at 13:53

## 2021-01-01 RX ADMIN — CEFTRIAXONE 1000 MILLIGRAM(S): 500 INJECTION, POWDER, FOR SOLUTION INTRAMUSCULAR; INTRAVENOUS at 18:02

## 2021-01-01 RX ADMIN — Medication 1 MILLIGRAM(S): at 10:28

## 2021-01-01 RX ADMIN — Medication 100 MILLIGRAM(S): at 09:48

## 2021-01-01 RX ADMIN — AZITHROMYCIN 255 MILLIGRAM(S): 500 TABLET, FILM COATED ORAL at 17:02

## 2021-01-01 RX ADMIN — Medication 1 MILLIGRAM(S): at 09:47

## 2021-01-01 RX ADMIN — Medication 500 MILLIGRAM(S): at 21:14

## 2021-01-01 RX ADMIN — Medication 175 MICROGRAM(S): at 05:18

## 2021-01-01 RX ADMIN — ENOXAPARIN SODIUM 40 MILLIGRAM(S): 100 INJECTION SUBCUTANEOUS at 21:14

## 2021-01-01 RX ADMIN — PIPERACILLIN AND TAZOBACTAM 25 GRAM(S): 4; .5 INJECTION, POWDER, LYOPHILIZED, FOR SOLUTION INTRAVENOUS at 13:31

## 2021-01-01 RX ADMIN — Medication 1 TABLET(S): at 10:04

## 2021-01-01 RX ADMIN — MIRTAZAPINE 7.5 MILLIGRAM(S): 45 TABLET, ORALLY DISINTEGRATING ORAL at 11:24

## 2021-01-01 RX ADMIN — Medication 1 TABLET(S): at 09:44

## 2021-01-01 RX ADMIN — Medication 1 MILLIGRAM(S): at 10:11

## 2021-01-01 RX ADMIN — Medication 1 MILLIGRAM(S): at 11:24

## 2021-01-01 RX ADMIN — PIPERACILLIN AND TAZOBACTAM 25 GRAM(S): 4; .5 INJECTION, POWDER, LYOPHILIZED, FOR SOLUTION INTRAVENOUS at 21:39

## 2021-01-01 RX ADMIN — PIPERACILLIN AND TAZOBACTAM 25 GRAM(S): 4; .5 INJECTION, POWDER, LYOPHILIZED, FOR SOLUTION INTRAVENOUS at 14:12

## 2021-01-01 RX ADMIN — PIPERACILLIN AND TAZOBACTAM 25 GRAM(S): 4; .5 INJECTION, POWDER, LYOPHILIZED, FOR SOLUTION INTRAVENOUS at 05:31

## 2021-01-01 RX ADMIN — Medication 1 TABLET(S): at 10:14

## 2021-01-01 RX ADMIN — SODIUM CHLORIDE 1000 MILLILITER(S): 9 INJECTION INTRAMUSCULAR; INTRAVENOUS; SUBCUTANEOUS at 14:48

## 2021-01-01 RX ADMIN — Medication 175 MICROGRAM(S): at 06:14

## 2021-01-01 RX ADMIN — Medication 175 MICROGRAM(S): at 06:01

## 2021-01-01 RX ADMIN — TRAMADOL HYDROCHLORIDE 25 MILLIGRAM(S): 50 TABLET ORAL at 22:04

## 2021-01-01 RX ADMIN — ENOXAPARIN SODIUM 40 MILLIGRAM(S): 100 INJECTION SUBCUTANEOUS at 22:36

## 2021-01-01 RX ADMIN — Medication 1 TABLET(S): at 10:28

## 2021-01-01 RX ADMIN — ENOXAPARIN SODIUM 40 MILLIGRAM(S): 100 INJECTION SUBCUTANEOUS at 10:11

## 2021-01-01 RX ADMIN — OXYCODONE HYDROCHLORIDE 5 MILLIGRAM(S): 5 TABLET ORAL at 06:48

## 2021-01-01 RX ADMIN — Medication 1 TABLET(S): at 09:42

## 2021-01-01 RX ADMIN — Medication 1 TABLET(S): at 09:48

## 2021-01-01 RX ADMIN — ENOXAPARIN SODIUM 40 MILLIGRAM(S): 100 INJECTION SUBCUTANEOUS at 21:00

## 2021-01-01 RX ADMIN — ENOXAPARIN SODIUM 40 MILLIGRAM(S): 100 INJECTION SUBCUTANEOUS at 20:35

## 2021-01-01 RX ADMIN — Medication 1 TABLET(S): at 11:24

## 2021-01-01 RX ADMIN — Medication 175 MICROGRAM(S): at 05:17

## 2021-01-01 RX ADMIN — SODIUM CHLORIDE 70 MILLILITER(S): 9 INJECTION INTRAMUSCULAR; INTRAVENOUS; SUBCUTANEOUS at 06:01

## 2021-01-01 RX ADMIN — Medication 1 TABLET(S): at 09:25

## 2021-01-01 RX ADMIN — Medication 100 MILLIGRAM(S): at 09:44

## 2021-01-01 RX ADMIN — ENOXAPARIN SODIUM 40 MILLIGRAM(S): 100 INJECTION SUBCUTANEOUS at 11:22

## 2021-01-01 RX ADMIN — Medication 1 MILLIGRAM(S): at 09:48

## 2021-01-01 RX ADMIN — TRAMADOL HYDROCHLORIDE 25 MILLIGRAM(S): 50 TABLET ORAL at 19:50

## 2021-01-01 RX ADMIN — CEFTRIAXONE 1000 MILLIGRAM(S): 500 INJECTION, POWDER, FOR SOLUTION INTRAMUSCULAR; INTRAVENOUS at 17:23

## 2021-01-01 RX ADMIN — OXYCODONE HYDROCHLORIDE 5 MILLIGRAM(S): 5 TABLET ORAL at 16:11

## 2021-01-01 RX ADMIN — PIPERACILLIN AND TAZOBACTAM 25 GRAM(S): 4; .5 INJECTION, POWDER, LYOPHILIZED, FOR SOLUTION INTRAVENOUS at 05:52

## 2021-01-01 RX ADMIN — Medication 650 MILLIGRAM(S): at 05:17

## 2021-01-01 RX ADMIN — Medication 1 MILLIGRAM(S): at 10:02

## 2021-01-01 RX ADMIN — ERGOCALCIFEROL 50000 UNIT(S): 1.25 CAPSULE ORAL at 13:32

## 2021-01-01 RX ADMIN — SODIUM CHLORIDE 70 MILLILITER(S): 9 INJECTION INTRAMUSCULAR; INTRAVENOUS; SUBCUTANEOUS at 06:34

## 2021-01-01 RX ADMIN — Medication 175 MICROGRAM(S): at 05:57

## 2021-01-01 RX ADMIN — ENOXAPARIN SODIUM 40 MILLIGRAM(S): 100 INJECTION SUBCUTANEOUS at 21:47

## 2021-01-01 RX ADMIN — Medication 1 MILLIGRAM(S): at 09:42

## 2021-01-01 RX ADMIN — ONDANSETRON 4 MILLIGRAM(S): 8 TABLET, FILM COATED ORAL at 18:38

## 2021-01-01 RX ADMIN — Medication 2 MILLIGRAM(S): at 20:17

## 2021-01-01 RX ADMIN — MIRTAZAPINE 7.5 MILLIGRAM(S): 45 TABLET, ORALLY DISINTEGRATING ORAL at 09:40

## 2021-01-01 RX ADMIN — Medication 175 MICROGRAM(S): at 05:52

## 2021-01-01 RX ADMIN — PIPERACILLIN AND TAZOBACTAM 25 GRAM(S): 4; .5 INJECTION, POWDER, LYOPHILIZED, FOR SOLUTION INTRAVENOUS at 05:18

## 2021-01-01 RX ADMIN — TRAMADOL HYDROCHLORIDE 25 MILLIGRAM(S): 50 TABLET ORAL at 10:40

## 2021-01-01 RX ADMIN — Medication 1: at 12:23

## 2021-01-01 RX ADMIN — ENOXAPARIN SODIUM 40 MILLIGRAM(S): 100 INJECTION SUBCUTANEOUS at 09:56

## 2021-01-01 RX ADMIN — Medication 1 TABLET(S): at 09:22

## 2021-01-01 RX ADMIN — CEFTRIAXONE 1000 MILLIGRAM(S): 500 INJECTION, POWDER, FOR SOLUTION INTRAMUSCULAR; INTRAVENOUS at 17:13

## 2021-01-01 RX ADMIN — SODIUM CHLORIDE 70 MILLILITER(S): 9 INJECTION INTRAMUSCULAR; INTRAVENOUS; SUBCUTANEOUS at 20:22

## 2021-01-01 RX ADMIN — Medication 175 MICROGRAM(S): at 06:24

## 2021-01-01 RX ADMIN — ENOXAPARIN SODIUM 40 MILLIGRAM(S): 100 INJECTION SUBCUTANEOUS at 09:40

## 2021-01-01 RX ADMIN — Medication 650 MILLIGRAM(S): at 05:52

## 2021-01-01 RX ADMIN — Medication 175 MICROGRAM(S): at 06:32

## 2021-01-01 RX ADMIN — Medication 175 MICROGRAM(S): at 06:09

## 2021-01-01 RX ADMIN — ENOXAPARIN SODIUM 40 MILLIGRAM(S): 100 INJECTION SUBCUTANEOUS at 20:57

## 2021-01-01 RX ADMIN — AZITHROMYCIN 500 MILLIGRAM(S): 500 TABLET, FILM COATED ORAL at 17:25

## 2021-01-01 RX ADMIN — SODIUM CHLORIDE 1000 MILLILITER(S): 9 INJECTION INTRAMUSCULAR; INTRAVENOUS; SUBCUTANEOUS at 16:00

## 2021-01-01 RX ADMIN — Medication 1 MILLIGRAM(S): at 10:03

## 2021-01-01 RX ADMIN — Medication 175 MICROGRAM(S): at 06:57

## 2021-01-01 RX ADMIN — Medication 650 MILLIGRAM(S): at 23:05

## 2021-01-01 RX ADMIN — Medication 1 TABLET(S): at 09:40

## 2021-01-01 RX ADMIN — Medication 1 MILLIGRAM(S): at 09:40

## 2021-01-01 RX ADMIN — CEFTRIAXONE 1000 MILLIGRAM(S): 500 INJECTION, POWDER, FOR SOLUTION INTRAMUSCULAR; INTRAVENOUS at 17:02

## 2021-01-01 RX ADMIN — ENOXAPARIN SODIUM 40 MILLIGRAM(S): 100 INJECTION SUBCUTANEOUS at 21:34

## 2021-01-01 RX ADMIN — ENOXAPARIN SODIUM 40 MILLIGRAM(S): 100 INJECTION SUBCUTANEOUS at 21:04

## 2021-01-01 RX ADMIN — Medication 650 MILLIGRAM(S): at 12:00

## 2021-01-01 RX ADMIN — Medication 500 MILLIGRAM(S): at 09:25

## 2021-01-01 RX ADMIN — PIPERACILLIN AND TAZOBACTAM 25 GRAM(S): 4; .5 INJECTION, POWDER, LYOPHILIZED, FOR SOLUTION INTRAVENOUS at 05:41

## 2021-01-01 RX ADMIN — SODIUM CHLORIDE 70 MILLILITER(S): 9 INJECTION INTRAMUSCULAR; INTRAVENOUS; SUBCUTANEOUS at 06:09

## 2021-01-01 RX ADMIN — Medication 650 MILLIGRAM(S): at 23:19

## 2021-01-01 RX ADMIN — Medication 175 MICROGRAM(S): at 05:31

## 2021-01-01 RX ADMIN — Medication 100 MILLIGRAM(S): at 10:03

## 2021-01-01 RX ADMIN — PIPERACILLIN AND TAZOBACTAM 25 GRAM(S): 4; .5 INJECTION, POWDER, LYOPHILIZED, FOR SOLUTION INTRAVENOUS at 21:02

## 2021-05-19 NOTE — H&P ADULT - NSICDXPASTMEDICALHX_GEN_ALL_CORE_FT
PAST MEDICAL HISTORY:  Dementia     HTN (hypertension)     Hypothyroidism     Leukemia      PAST MEDICAL HISTORY:  Hairy cell leukemia     Hypothyroidism s/p thyroidectomy     PAST MEDICAL HISTORY:  Alcohol abuse per daughter    BPH (benign prostatic hyperplasia)     Cerebrovascular accident (CVA) per daughter, in 2019    GERD (gastroesophageal reflux disease)     Hairy cell leukemia     Hypothyroidism s/p thyroidectomy

## 2021-05-19 NOTE — ED PROVIDER NOTE - CARE PLAN
Principal Discharge DX:	CAP (community acquired pneumonia)  Secondary Diagnosis:	Failure to thrive in adult

## 2021-05-19 NOTE — H&P ADULT - NSHPREVIEWOFSYSTEMS_GEN_ALL_CORE
Gen: + weight loss, malaise, fatigue. Negative for fevers, chills, or weight gain  Eyes: no change in vision  ENT: no tinnitus or vertigo  Resp: no wheezing, dyspnea, or pleuritic chest pain  CV: no chest pain, dyspnea on exertion, or palpitations  GI: no nausea, vomiting, abdominal pain, diarrhea, constipation, melena, or hematochezia  : no dysuria or hematuria  MSK: + chronic arthralgias. No joint swelling or myalgias  Neuro: + weakness. No known confusion, tremors, or seizures  Skin: no rash, lesions, or edema

## 2021-05-19 NOTE — H&P ADULT - NSICDXPASTSURGICALHX_GEN_ALL_CORE_FT
PAST SURGICAL HISTORY:  No significant past surgical history      PAST SURGICAL HISTORY:  History of prostatectomy unclear why    History of thyroidectomy     History of tonsillectomy      PAST SURGICAL HISTORY:  History of prostatectomy     History of thyroidectomy     History of tonsillectomy

## 2021-05-19 NOTE — ED PROVIDER NOTE - NS ED ROS FT
Constitutional: No fever or chills, + generalized weakness, weight loss   Eyes: No visual changes  HEENT: No throat pain  CV: No chest pain  Resp: No SOB no cough  GI: No abd pain, nausea or vomiting  : No dysuria  MSK: No musculoskeletal pain  Skin: No rash  Neuro: No headache

## 2021-05-19 NOTE — H&P ADULT - NSICDXFAMILYHX_GEN_ALL_CORE_FT
FAMILY HISTORY:  Sibling  Still living? Unknown  Family history of cancer of genitourinary system, Age at diagnosis: Age Unknown

## 2021-05-19 NOTE — ED PROVIDER NOTE - OBJECTIVE STATEMENT
86 y/o male with PMHx of dementia, HTN, hypothyroidism, leukemia presents to the ED for generalized weakness x 1 week. Denies fevers, chills, SOB, and CP.  Pt hasn't been to the doctor in a couple of months. Pt has been loosing weight for past 1 week. He lost about 10-12 lbs  Pt lives with his wife. Denies coughing.

## 2021-05-19 NOTE — ED PROVIDER NOTE - CLINICAL SUMMARY MEDICAL DECISION MAKING FREE TEXT BOX
88 y/o male with PMHx of dementia, HTN, hypothyroidism, leukemia presents to the ED weakness and 10 point weight loss x 1 week. Concerned for failure to thrive, less likely infectious etiology. Will obtain blood work, rehydrate, imagining and reassess.

## 2021-05-19 NOTE — H&P ADULT - NSHPPHYSICALEXAM_GEN_ALL_CORE
Vital Signs Last 24 Hrs  T(C): 37.1 (19 May 2021 17:26), Max: 37.1 (19 May 2021 17:26)  T(F): 98.7 (19 May 2021 17:26), Max: 98.7 (19 May 2021 17:26)  HR: 90 (19 May 2021 17:26) (89 - 107)  BP: 128/61 (19 May 2021 17:26) (128/61 - 140/58)  BP(mean): --  RR: 20 (19 May 2021 17:26) (20 - 20)  SpO2: 97% (19 May 2021 17:26) (95% - 97%) Vital Signs Last 24 Hrs  T(C): 37.1 (19 May 2021 17:26), Max: 37.1 (19 May 2021 17:26)  T(F): 98.7 (19 May 2021 17:26), Max: 98.7 (19 May 2021 17:26)  HR: 90 (19 May 2021 17:26) (89 - 107)  BP: 128/61 (19 May 2021 17:26) (128/61 - 140/58)  BP(mean): --  RR: 20 (19 May 2021 17:26) (20 - 20)  SpO2: 97% (19 May 2021 17:26) (95% - 97%)    GENERAL: No acute distress. Very cachectic  HEENT: PERRL, EOMI, MM dry, no oropharyngeal lesions. Choking slightly on water  NECK: supple, no stiffness, no JVD, no thyromegaly  PULM: respirations non-labored, clear to auscultation bilaterally, but decreased breath sounds in the RLL. No rales, rhonchi, or wheezes  CV: regular rate and rhythm, II/VI systolic murmur at apex. No gallops or rubs  GI: abdomen soft, nontender, nondistended, no masses felt, normal bowel sounds  MSK: strength 4.5/5 LUE/LLE, 4/5 RUE/RLE. No joint swelling, erythema, or warmth.  LYMPH: no anterior cervical, posterior cervical, supraclavicular, or inguinal lymphadenopathy  NEURO: A&Ox3, no tremors, sensation intact  SKIN: no rashes, lesions, or edema

## 2021-05-19 NOTE — ED PROVIDER NOTE - PHYSICAL EXAMINATION
Constitutional: Elderly male laying in bed in NAD, appears cachetic AAOx3  Eyes: PERRLA EOMI  Head: Normocephalic atraumatic  Mouth: Oropharynx dry  Cardiac: regular rate,    Resp: Lungs CTAB,   GI: Abd s/nt/nd, no rebound or guarding.  Neuro: awake, alert, moving all extremities, cranial nerves 2-12 intact, sensation intact, no dysmetria.  Skin: No rashes

## 2021-05-19 NOTE — H&P ADULT - HISTORY OF PRESENT ILLNESS
86 yo M with a PMH hypothyroidism, HTN, and leukemia presents with weakness.    In the ED, he was given Ceftriaxone 1 g IV x1, Azithromycin 500 mg IV x1, and NS 1L x2. 88 yo M with a PMH hypothyroidism and hairy cell leukemia (not on treatment) presents with weakness.    In the ED, he was given Ceftriaxone 1 g IV x1, Azithromycin 500 mg IV x1, and NS 1L x2. 86 yo M with a PMH hypothyroidism (due to thyroidectomy) and hairy cell leukemia (not on treatment) presents with weakness. He has been feeling very weak for the past week. He has not eaten much and has lost weight. He also endorses a dry cough intermittently over the last week. He denies fevers, chills, SOB, chest pain, nausea, vomiting, abdominal pain, diarrhea, constipation, urinary complaints, rash, or swelling. He has hairy cell leukemia for which he received 3 treatments, the last one 2-3 years ago. He is not sure why he has not received more treatments.  He states normally he is active. He ambulates with a stroller but has had no recent falls.  He has no known history of stroke.    In the ED, he was given Ceftriaxone 1 g IV x1, Azithromycin 500 mg IV x1, and NS 1L x2. 88 yo M with a PMH hypothyroidism (due to thyroidectomy) and hairy cell leukemia (not on treatment) presents with weakness. He has been feeling very weak for the past week. He has not eaten much and has lost weight. He also endorses a dry cough intermittently over the last week. He denies fevers, chills, SOB, chest pain, nausea, vomiting, abdominal pain, diarrhea, constipation, urinary complaints, rash, or swelling. He has hairy cell leukemia for which he received 3 treatments, the last one 2-3 years ago. He is not sure why he has not received more treatments.  He states normally he is active. He ambulates with a stroller but has had no recent falls.  He has no known history of stroke.    Per daughter, he has been totally homebound.    In the ED, he was given Ceftriaxone 1 g IV x1, Azithromycin 500 mg IV x1, and NS 1L x2. 88 yo M with a PMH hypothyroidism (due to thyroidectomy), 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 He has been feeling very weak for the past week. He has not eaten much and has lost weight. He also endorses a dry cough intermittently over the last week. He denies fevers, chills, SOB, chest pain, nausea, vomiting, abdominal pain, diarrhea, constipation, urinary complaints, rash, or swelling. He has hairy cell leukemia for which he received 3 treatments, the last one 2-3 years ago. He is not sure why he has not received more treatments.  He states normally he is active. He ambulates with a stroller but has had no recent falls.    He states has no known history of stroke but daughter states he HAS had one about 1.5 years ago with slurred speech and weakness.  Per daughter, he has been totally homebound.    In the ED, he was given Ceftriaxone 1 g IV x1, Azithromycin 500 mg IV x1, and NS 1L x2.

## 2021-05-19 NOTE — H&P ADULT - ASSESSMENT
86 yo M with a PMH hypothyroidism, HTN, and leukemia presents with weakness due to pneumonia. 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    1/2/3) Community-acquired bacterial pneumonia/Sepsis/Lactic Acidosis  - Patient meets sepsis criteria with leukocytosis and tachycardia (although unclear if WBC may partly be due to leukemia)  - With RLL bacterial pneumonia  - Lactate 2.5, improved to 1.9  - Given Ceftriaxone/Azithromycin, will continue IV  - F/u blood cx  - F/u COVID-19 PCR. Patient is LOW suspicion for COVID  - PT eval    4) Right sided weakness  - Patient is weaker on right side than left. No known history of CVA  - Unlikely this is acute weakness  - Obtain CT head to evaluate for prior CVA  - No need to monitor on telemetry    5) Hairy Cell Leukemia  - Last treatment reportedly several years ago  - Unclear if patient is following up  - With leukocytosis that may partly be from leukemia  vs PNA  - Heme/Onc consult    6/7) Failure to Thrive in Adult/Dysphagia  - Likely partly due to pneumonia, but patient is also very cachectic  - Unclear if patient is following up for leukemia  - Albumin low, TSH normal  - Patient appears to be choking on some foods, which may be predisposing him to pneumonia  - Will give dysphagia diet with speech/swallow  - IVFs  - Nutrition consult  - Heme/Onc consult    8) Hypothyroidism  - C/w Levothyroxine 175 ucg QD  - TSH normal    9) Prophylactic measure  - DVT PPX: IMPROVE score of 3, will give Lovenox 40 mg SQ QD  - Diet: dysphagia 2 mechanical soft, obtain S/S  - Dispo: pending PT eval, S/S eval, and improvement in sxs    10) Advanced Care Planning/Counseling Discussion  - Discussed advanced care planning, including code status, with the patient. He stated that in the event of cardiac or pulmonary arrest, he would want CPR and/or intubation as otherwise medically indicated (FULL CODE).  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    1/2/3) Community-acquired bacterial pneumonia/Sepsis/Lactic Acidosis  - Patient meets sepsis criteria with leukocytosis and tachycardia (although unclear if WBC may partly be due to leukemia)  - With RLL bacterial pneumonia  - Lactate 2.5, improved to 1.9  - Given Ceftriaxone/Azithromycin, will continue IV  - F/u blood cx  - F/u COVID-19 PCR. Patient is LOW suspicion for COVID  - PT eval    4) Right sided weakness  - Patient is weaker on right side than left. Initially patient denied a history of CVA but daughter states he DID have one  - Unlikely new weakness  - CT head obtained to evaluate for prior CVA  - No need to monitor on telemetry    5) Hairy Cell Leukemia  - Last treatment reportedly several years ago  - Unclear if patient is following up  - With leukocytosis that may partly be from leukemia  vs PNA  - Heme/Onc consult    6/7/8) Failure to Thrive in Adult/Dysphagia/Alcohol abuse  - Likely partly due to pneumonia, but patient is also very cachectic  - Unclear if patient is following up for leukemia  - Albumin low, TSH normal  - Patient appears to be choking on some foods, which may be predisposing him to pneumonia  - May be in the setting of prior CVA  - Will give dysphagia diet with speech/swallow  - IVFs  - Nutrition consult  - Heme/Onc consult  - Also, despite the patient denying it, daughter states he is an excessive alcohol abuser  - Will place on sx-triggered CIWA  - Given low albumin and elevated INR, will check RUQ US to eval for cirrhosis  - Check B12 and folate  - Check alcohol level  - MVI, FA, and thiamine    9) Hypothyroidism  - C/w Levothyroxine 175 ucg QD  - TSH normal    10) Prophylactic measure  - DVT PPX: IMPROVE score of 3, will give Lovenox 40 mg SQ QD  - Diet: dysphagia 2 mechanical soft, obtain S/S  - Dispo: pending PT eval, S/S eval, and improvement in sxs    11) Advanced Care Planning/Counseling Discussion  - Discussed advanced care planning, including code status, with the patient. He stated that in the event of cardiac or pulmonary arrest, he would want CPR and/or intubation as otherwise medically indicated (FULL CODE).  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    1/2/3) Community-acquired bacterial pneumonia/Sepsis/Lactic Acidosis  - Patient meets sepsis criteria with leukocytosis and tachycardia (although unclear if WBC may partly be due to leukemia)  - With RLL bacterial pneumonia  - Lactate 2.5, improved to 1.9  - Given Ceftriaxone/Azithromycin, will continue IV  - F/u blood cx  - F/u COVID-19 PCR. Patient is LOW suspicion for COVID  - PT eval    4) Right sided weakness  - Patient is weaker on right side than left. Initially patient denied a history of CVA but daughter states he DID have one  - Unlikely new weakness  - CT head obtained to evaluate for prior CVA  - No need to monitor on telemetry    5) Hairy Cell Leukemia  - Last treatment reportedly several years ago  - Unclear if patient is following up  - With leukocytosis that may partly be from leukemia  vs PNA  - Heme/Onc consult    6/7/8) Failure to Thrive in Adult/Dysphagia/Alcohol abuse  - Likely partly due to pneumonia, but patient is also very cachectic  - Unclear if patient is following up for leukemia  - Albumin low, TSH normal  - Patient appears to be choking on some foods, which may be predisposing him to pneumonia  - May be in the setting of prior CVA  - Will give dysphagia diet with speech/swallow  - IVFs  - Nutrition consult  - Heme/Onc consult  - Also, despite the patient denying it, daughter states he is an excessive alcohol abuser  - Will place on sx-triggered CIWA  - Given low albumin and elevated INR, will check RUQ US to eval for cirrhosis  - Check B12 and folate  - Check alcohol level  - MVI, FA, and thiamine    9) Hyperglycemia  - No known history of diabetes, but admission   - Will check FS with low dose SSI QAC + HS  - Check A1C    10) Hypothyroidism  - C/w Levothyroxine 175 ucg QD  - TSH normal    11) Prophylactic measure  - DVT PPX: IMPROVE score of 3, will give Lovenox 40 mg SQ QD  - Diet: dysphagia 2 mechanical soft, obtain S/S  - Dispo: pending PT eval, S/S eval, and improvement in sxs    12) Advanced Care Planning/Counseling Discussion  - Discussed advanced care planning, including code status, with the patient. He stated that in the event of cardiac or pulmonary arrest, he would want CPR and/or intubation as otherwise medically indicated (FULL CODE).  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    1/2/3) Community-acquired bacterial pneumonia/Sepsis/Lactic Acidosis  - Patient meets sepsis criteria with leukocytosis and tachycardia (although unclear if WBC may partly be due to leukemia)  - With RLL bacterial pneumonia  - Lactate 2.5, improved to 1.9  - Given Ceftriaxone/Azithromycin, will continue IV  - F/u blood cx  - F/u COVID-19 PCR. Patient is LOW suspicion for COVID  - PT eval    4) Right sided weakness  - Patient is weaker on right side than left. Initially patient denied a history of CVA but daughter states he DID have one  - Unlikely new weakness  - CT head obtained to evaluate for prior CVA  - No need to monitor on telemetry    5) Hairy Cell Leukemia  - Last treatment reportedly several years ago  - Unclear if patient is following up  - With leukocytosis that may partly be from leukemia  vs PNA  - Heme/Onc consult    6/7/8) Failure to Thrive in Adult/Dysphagia/Alcohol abuse  - Likely partly due to pneumonia, but patient is also very cachectic  - Unclear if patient is following up for leukemia  - Albumin low, TSH normal  - Patient appears to be choking on some foods, which may be predisposing him to pneumonia  - May be in the setting of prior CVA  - Will give dysphagia diet with speech/swallow  - IVFs  - Nutrition consult  - Heme/Onc consult  - Also, despite the patient denying it, daughter states he is an excessive alcohol abuser  - Will place on sx-triggered CIWA  - Given low albumin and elevated INR, will check RUQ US to eval for cirrhosis  - Check B12 and folate  - Check alcohol level  - MVI, FA, and thiamine    9) Hyperglycemia  - No known history of diabetes, but admission   - Will check FS with low dose SSI QAC + HS  - Check A1C    10) Hypothyroidism  - C/w Levothyroxine 175 ucg QD  - TSH normal    11) Prophylactic measure  - DVT PPX: IMPROVE score of 3, will give Lovenox 40 mg SQ QD  - Diet: dysphagia 2 mechanical soft, obtain S/S  - Dispo: pending PT eval, S/S eval, and improvement in sxs. Daughter updated on plan.    12) Advanced Care Planning/Counseling Discussion  - Discussed advanced care planning, including code status, with the patient. He stated that in the event of cardiac or pulmonary arrest, he would want CPR and/or intubation as otherwise medically indicated (FULL CODE).  - 16 minutes spent

## 2021-05-19 NOTE — H&P ADULT - NSHPLABSRESULTS_GEN_ALL_CORE
Labs personally reviewed and interpreted. Notable for leukocytosis (WBC 15.11) with 89.7% neutrophils and lymphopenia (0.62). Hb 11.2, plt 262, INR 1.38, lactate elevated at 2.5, electrolytes wnl, HCO3 30, BUN/creatinine 23/0.81, BG elevated at 201, albumin 2.2, alk phos normal 92, AST elevated at 42, ALT normal at 46, TSH normal at 2.13, and troponin normal at 0.034 x1.    CXR personally reviewed and interpreted. Notable for RLL infiltrate. No obvious effusions, pneumothorax, or obvious cardiomegaly.    EKG _____________ Labs personally reviewed and interpreted. Notable for leukocytosis (WBC 15.11) with 89.7% neutrophils and lymphopenia (0.62). Hb 11.2, plt 262, INR 1.38, lactate elevated at 2.5, electrolytes wnl, HCO3 30, BUN/creatinine 23/0.81, BG elevated at 201, albumin 2.2, alk phos normal 92, AST elevated at 42, ALT normal at 46, TSH normal at 2.13, and troponin normal at 0.034 x1.  COVID-19 PCR result pending.    CXR personally reviewed and interpreted. Notable for RLL infiltrate. No obvious effusions, pneumothorax, or obvious cardiomegaly.    EKG _____________ Labs personally reviewed and interpreted. Notable for leukocytosis (WBC 15.11) with 89.7% neutrophils and lymphopenia (0.62). Hb 11.2, plt 262, INR 1.38, lactate elevated at 2.5 x1 and then 1.9 x1, electrolytes wnl, HCO3 30, BUN/creatinine 23/0.81, BG elevated at 201, albumin 2.2, alk phos normal 92, AST elevated at 42, ALT normal at 46, TSH normal at 2.13, and troponin normal at 0.034 x1.  COVID-19 PCR result pending.    CXR personally reviewed and interpreted. Notable for RLL infiltrate. No obvious effusions, pneumothorax, or obvious cardiomegaly.    EKG _____________ Labs personally reviewed and interpreted. Notable for leukocytosis (WBC 15.11) with 89.7% neutrophils and lymphopenia (0.62). Hb 11.2, plt 262, INR 1.38, lactate elevated at 2.5 x1 and then 1.9 x1, electrolytes wnl, HCO3 30, BUN/creatinine 23/0.81, BG elevated at 201, albumin 2.2, alk phos normal 92, AST elevated at 42, ALT normal at 46, TSH normal at 2.13, and troponin normal at 0.034 x1.  COVID-19 PCR result pending.    CXR personally reviewed and interpreted. Notable for RLL infiltrate. No obvious effusions, pneumothorax, or obvious cardiomegaly.    EKG personally reviewed. Sinus tachycardia with PAC, normal axis. No pathological Q waves or ST changes. Rate 101, , QTc 440.

## 2021-05-19 NOTE — ED ADULT NURSE NOTE - OBJECTIVE STATEMENT
pt presents to ED from home c/o weakness and decreased PO intake x 1 week. hx of leukemia, thyroid removal (though pt states no hx thyroid CA), prostatectomy. pt a&ox3, cachectic. denies pain, shortness of breath, chest pain. denies falls. states lives at home w/ wife and 2 home health aids.

## 2021-05-19 NOTE — ED ADULT NURSE REASSESSMENT NOTE - NS ED NURSE REASSESS COMMENT FT1
Lactate 2.5 NS bolus infusing at this time.  Ceftriaxone given.  Daughter updated as to plan of care.
lab called w/ lactate 2.5; BRAYDON Colindres made aware
resting no complaints repositioned for comfort.  Patient has right shoulder abrasion and pressure markings on his coccyx, shoulders and back of head and sacral area.  Patient is cachectic and requires frequent repositioning.  IV infusing with no signs of infiltration noted in left AC.
Patient received at 2130 from BRAYDON Lau. Patient resting at this time.  Lab at bedside at this time.  Blood cultures drawn.  COVID swab done and sent.  Patient is resting at this time CM RSR [id number].

## 2021-05-20 NOTE — SWALLOW BEDSIDE ASSESSMENT ADULT - SLP GENERAL OBSERVATIONS
On encounter, a loss of bulk was evident in strap muscle regions. The pt is alert. Affect is flat. He is communicatively passive & irritable at times. Pt able to verbalize during communicative probes without evidence of a primary speech-language pathology, albeit somewhat brief/vague. Verbal brevity/vagueness likely related to encephalopathy associated with acute illness/sepsis/PNA as well as underlying ETOH abuse.

## 2021-05-20 NOTE — PHYSICAL THERAPY INITIAL EVALUATION ADULT - MANUAL MUSCLE TESTING RESULTS, REHAB EVAL
except R UE: shld FE 3+/5; elbow flex 4-/5; ext 3+/5;  4-/5; R LE: hip/knee flex 4-/5; knee ext 3+/5; ankle PF/DF 4-/5; Note: Hx of CVA/no strength deficits were identified

## 2021-05-20 NOTE — SWALLOW BEDSIDE ASSESSMENT ADULT - SWALLOW EVAL: SECRETION MANAGEMENT
Oral saliva management was functional. Note that strength of volitional cough was somewhat reduced. Further, pt's cough was moist.

## 2021-05-20 NOTE — SWALLOW BEDSIDE ASSESSMENT ADULT - ORAL PHASE
Bolus formation/transfer were mildly to moderately prolonged and discontinuous but felt to be mechanically functional with some of the above modified food consistencies. Piecemeal deglutition was evident. Mild+ tongue debris noted with mechanical soft solids.

## 2021-05-20 NOTE — CONSULT NOTE ADULT - PROBLEM SELECTOR RECOMMENDATION 2
Patient has progressive difficulties with swallowing, Possibly onset of dementia Which likely is contributing

## 2021-05-20 NOTE — DIETITIAN INITIAL EVALUATION ADULT. - PHYSCIAL ASSESSMENT
fabian score of 13; stage 2 PU on coccyx  no BM documented; if > 3 days without BM, add bowel regimen underweight/emaciated

## 2021-05-20 NOTE — DIETITIAN INITIAL EVALUATION ADULT. - PERTINENT MEDS FT
MEDICATIONS  (STANDING):  azithromycin  IVPB 500 milliGRAM(s) IV Intermittent every 24 hours  cefTRIAXone Injectable. 1000 milliGRAM(s) IV Push every 24 hours  dextrose 40% Gel 15 Gram(s) Oral once  dextrose 50% Injectable 25 Gram(s) IV Push once  dextrose 50% Injectable 12.5 Gram(s) IV Push once  dextrose 50% Injectable 25 Gram(s) IV Push once  enoxaparin Injectable 40 milliGRAM(s) SubCutaneous daily  folic acid 1 milliGRAM(s) Oral daily  glucagon  Injectable 1 milliGRAM(s) IntraMuscular once  insulin lispro (ADMELOG) corrective regimen sliding scale   SubCutaneous three times a day before meals  insulin lispro (ADMELOG) corrective regimen sliding scale   SubCutaneous at bedtime  levothyroxine 175 MICROGram(s) Oral daily  multivitamin 1 Tablet(s) Oral daily  thiamine 100 milliGRAM(s) Oral daily    MEDICATIONS  (PRN):  LORazepam   Injectable 1 milliGRAM(s) IV Push every 2 hours PRN CIWA-Ar score increase by 2 points and a total score of 7 or less  LORazepam   Injectable 1 milliGRAM(s) IV Push every 1 hour PRN CIWA-Ar score 8 or greater

## 2021-05-20 NOTE — PROGRESS NOTE ADULT - SUBJECTIVE AND OBJECTIVE BOX
Cheif complaints and Diagnosis:  CAP/ sepsis     Subjective:       REVIEW OF SYSTEMS:    CONSTITUTIONAL: No weakness, fevers or chills  EYES/ENT: No visual changes;  No vertigo or throat pain   NECK: No pain or stiffness  RESPIRATORY: No cough, wheezing, hemoptysis; No shortness of breath  CARDIOVASCULAR: No chest pain or palpitations  GASTROINTESTINAL: No abdominal or epigastric pain. No nausea, vomiting, or hematemesis; No diarrhea or constipation. No melena or hematochezia.  GENITOURINARY: No dysuria, frequency or hematuria  NEUROLOGICAL: No numbness or weakness  SKIN: No itching, burning, rashes, or lesions   All other review of systems is negative unless indicated above      Vital Signs Last 24 Hrs  T(C): 36.7 (20 May 2021 08:30), Max: 37.3 (19 May 2021 23:25)  T(F): 98 (20 May 2021 08:30), Max: 99.1 (19 May 2021 23:25)  HR: 84 (20 May 2021 08:30) (84 - 107)  BP: 126/50 (20 May 2021 08:30) (115/51 - 145/62)  BP(mean): 87 (19 May 2021 19:21) (87 - 87)  RR: 17 (20 May 2021 08:30) (17 - 22)  SpO2: 95% (20 May 2021 08:30) (95% - 97%)    HEENT:   pupils equal and reactive, EOMI, no oropharyngeal lesions, erythema, exudates, oral thrush    NECK:   supple, no carotid bruits, no palpable lymph nodes, no thyromegaly    CV:  +S1, +S2, regular, no murmurs or rubs    RESP:   lungs clear to auscultation bilaterally, no wheezing, rales, rhonchi, good air entry bilaterally    BREAST:  not examined    GI:  abdomen soft, non-tender, non-distended, normal BS, no bruits, no abdominal masses, no palpable masses    RECTAL:  not examined    :  not examined    MSK:   normal muscle tone, no atrophy, no rigidity, no contractions    EXT:   no clubbing, no cyanosis, no edema, no calf pain, swelling or erythema    VASCULAR:  pulses equal and symmetric in the upper and lower extremities    NEURO:  AAOX3, no focal neurological deficits, follows all commands, able to move extremities spontaneously    SKIN:  no ulcers, lesions or rashes    MEDICATIONS  (STANDING):  azithromycin  IVPB 500 milliGRAM(s) IV Intermittent every 24 hours  cefTRIAXone Injectable. 1000 milliGRAM(s) IV Push every 24 hours  dextrose 40% Gel 15 Gram(s) Oral once  dextrose 50% Injectable 25 Gram(s) IV Push once  dextrose 50% Injectable 12.5 Gram(s) IV Push once  dextrose 50% Injectable 25 Gram(s) IV Push once  enoxaparin Injectable 40 milliGRAM(s) SubCutaneous daily  folic acid 1 milliGRAM(s) Oral daily  glucagon  Injectable 1 milliGRAM(s) IntraMuscular once  insulin lispro (ADMELOG) corrective regimen sliding scale   SubCutaneous three times a day before meals  insulin lispro (ADMELOG) corrective regimen sliding scale   SubCutaneous at bedtime  levothyroxine 175 MICROGram(s) Oral daily  multivitamin 1 Tablet(s) Oral daily  thiamine 100 milliGRAM(s) Oral daily    MEDICATIONS  (PRN):  LORazepam   Injectable 1 milliGRAM(s) IV Push every 2 hours PRN CIWA-Ar score increase by 2 points and a total score of 7 or less  LORazepam   Injectable 1 milliGRAM(s) IV Push every 1 hour PRN CIWA-Ar score 8 or greater      19 May 2021 13:26    136    |  102    |  23     ----------------------------<  201    3.7     |  30     |  0.81     Ca    8.7        19 May 2021 13:26    TPro  6.5    /  Alb  2.2    /  TBili  1.2    /  DBili  x      /  AST  42     /  ALT  46     /  AlkPhos  97     19 May 2021 13:26  LIVER FUNCTIONS - ( 19 May 2021 13:26 )  Alb: 2.2 g/dL / Pro: 6.5 gm/dL / ALK PHOS: 97 U/L / ALT: 46 U/L / AST: 42 U/L / GGT: x         PT/INR - ( 19 May 2021 13:26 )   PT: 15.9 sec;   INR: 1.38 ratio         PTT - ( 19 May 2021 13:26 )  PTT:30.9 secCBC Full  -  ( 19 May 2021 13:26 )  WBC Count : 15.11 K/uL  Hemoglobin : 11.2 g/dL  Hematocrit : 34.1 %  Platelet Count - Automated : 262 K/uL  Mean Cell Volume : 61.7 fl  Mean Cell Hemoglobin : 20.3 pg  Mean Cell Hemoglobin Concentration : 32.8 gm/dL  Auto Neutrophil # : 13.56 K/uL  Auto Lymphocyte # : 0.62 K/uL  Auto Monocyte # : 0.81 K/uL  Auto Eosinophil # : 0.00 K/uL  Auto Basophil # : 0.02 K/uL  Auto Neutrophil % : 89.7 %  Auto Lymphocyte % : 4.1 %  Auto Monocyte % : 5.4 %  Auto Eosinophil % : 0.0 %  Auto Basophil % : 0.1 %  CARDIAC MARKERS ( 19 May 2021 13:26 )  0.034 ng/mL / x     / x     / x     / x                PT/INR - ( 19 May 2021 13:26 )   PT: 15.9 sec;   INR: 1.38 ratio         PTT - ( 19 May 2021 13:26 )  PTT:30.9 sec          Assessment and Plan:     88 yo M with a PMH hypothyroidism (due to thyroidectomy), 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    1- Community-acquired bacterial pneumonia/Sepsis/Lactic Acidosis  - Patient meets sepsis criteria with leukocytosis and tachycardia (although unclear if WBC may partly be due to leukemia)  - With RLL bacterial pneumonia  - Lactate 2.5, improved to 1.9  - Given Ceftriaxone/Azithromycin, will continue IV  - F/u blood cx  - F/u COVID-19 PCR. Patient is LOW suspicion for COVID  - PT eval      5) Hairy Cell Leukemia  - Last treatment reportedly several years ago  - Unclear if patient is following up  - With leukocytosis that may partly be from leukemia  vs PNA  - Heme/Onc consult    6/7/8) Failure to Thrive in Adult/Dysphagia/Alcohol abuse  - Likely partly due to pneumonia, but patient is also very cachectic  - Unclear if patient is following up for leukemia  - Albumin low, TSH normal  - Patient appears to be choking on some foods, which may be predisposing him to pneumonia  - May be in the setting of prior CVA  - Will give dysphagia diet with speech/swallow  - IVFs  - Nutrition consult  - Heme/Onc consult  - Also, despite the patient denying it, daughter states he is an excessive alcohol abuser  - Will place on sx-triggered CIWA  - Given low albumin and elevated INR, will check RUQ US to eval for cirrhosis  - Check B12 and folate  - Check alcohol level  - MVI, FA, and thiamine    9) Hyperglycemia  - No known history of diabetes, but admission   - Will check FS with low dose SSI QAC + HS  - Check A1C    10) Hypothyroidism  - C/w Levothyroxine 175 ucg QD  - TSH normal    11) Prophylactic measure  - DVT PPX: IMPROVE score of 3, will give Lovenox 40 mg SQ QD  - Diet: dysphagia 2 mechanical soft, obtain S/S  - Dispo: pending PT eval, S/S eval, and improvement in sxs. Daughter updated on plan.    12) Advanced Care Planning/Counseling Discussion  - Discussed advanced care planning, including code status, with the patient. He stated that in the event of cardiac or pulmonary arrest, he would want CPR and/or intubation as otherwise medically indicated (FULL CODE).  - 16 minutes spent     Cheif complaints and Diagnosis:  CAP/ sepsis /failrue to thrive    Subjective: no complaints      REVIEW OF SYSTEMS:    CONSTITUTIONAL: No weakness, fevers or chills  EYES/ENT: No visual changes;  No vertigo or throat pain   NECK: No pain or stiffness  RESPIRATORY: No cough, wheezing, hemoptysis; No shortness of breath  CARDIOVASCULAR: No chest pain or palpitations  GASTROINTESTINAL: No abdominal or epigastric pain. No nausea, vomiting, or hematemesis; No diarrhea or constipation. No melena or hematochezia.  GENITOURINARY: No dysuria, frequency or hematuria  NEUROLOGICAL: No numbness or weakness  SKIN: No itching, burning, rashes, or lesions   All other review of systems is negative unless indicated above      Vital Signs Last 24 Hrs  T(C): 36.7 (20 May 2021 08:30), Max: 37.3 (19 May 2021 23:25)  T(F): 98 (20 May 2021 08:30), Max: 99.1 (19 May 2021 23:25)  HR: 84 (20 May 2021 08:30) (84 - 107)  BP: 126/50 (20 May 2021 08:30) (115/51 - 145/62)  BP(mean): 87 (19 May 2021 19:21) (87 - 87)  RR: 17 (20 May 2021 08:30) (17 - 22)  SpO2: 95% (20 May 2021 08:30) (95% - 97%)    HEENT:   pupils equal and reactive, EOMI, no oropharyngeal lesions, erythema, exudates, oral thrush    NECK:   supple, no carotid bruits, no palpable lymph nodes, no thyromegaly    CV:  +S1, +S2, regular, no murmurs or rubs    RESP:   lungs clear to auscultation bilaterally, no wheezing, rales, rhonchi, good air entry bilaterally    BREAST:  not examined    GI:  abdomen soft, non-tender, non-distended, normal BS, no bruits, no abdominal masses, no palpable masses    RECTAL:  not examined    :  not examined    MSK:   normal muscle tone, no atrophy, no rigidity, no contractions    EXT:   no clubbing, no cyanosis, no edema, no calf pain, swelling or erythema    VASCULAR:  pulses equal and symmetric in the upper and lower extremities    NEURO:  AAOX3, no focal neurological deficits, follows all commands, able to move extremities spontaneously    SKIN:  no ulcers, lesions or rashes    MEDICATIONS  (STANDING):  azithromycin  IVPB 500 milliGRAM(s) IV Intermittent every 24 hours  cefTRIAXone Injectable. 1000 milliGRAM(s) IV Push every 24 hours  dextrose 40% Gel 15 Gram(s) Oral once  dextrose 50% Injectable 25 Gram(s) IV Push once  dextrose 50% Injectable 12.5 Gram(s) IV Push once  dextrose 50% Injectable 25 Gram(s) IV Push once  enoxaparin Injectable 40 milliGRAM(s) SubCutaneous daily  folic acid 1 milliGRAM(s) Oral daily  glucagon  Injectable 1 milliGRAM(s) IntraMuscular once  insulin lispro (ADMELOG) corrective regimen sliding scale   SubCutaneous three times a day before meals  insulin lispro (ADMELOG) corrective regimen sliding scale   SubCutaneous at bedtime  levothyroxine 175 MICROGram(s) Oral daily  multivitamin 1 Tablet(s) Oral daily  thiamine 100 milliGRAM(s) Oral daily    MEDICATIONS  (PRN):  LORazepam   Injectable 1 milliGRAM(s) IV Push every 2 hours PRN CIWA-Ar score increase by 2 points and a total score of 7 or less  LORazepam   Injectable 1 milliGRAM(s) IV Push every 1 hour PRN CIWA-Ar score 8 or greater      19 May 2021 13:26    136    |  102    |  23     ----------------------------<  201    3.7     |  30     |  0.81     Ca    8.7        19 May 2021 13:26    TPro  6.5    /  Alb  2.2    /  TBili  1.2    /  DBili  x      /  AST  42     /  ALT  46     /  AlkPhos  97     19 May 2021 13:26  LIVER FUNCTIONS - ( 19 May 2021 13:26 )  Alb: 2.2 g/dL / Pro: 6.5 gm/dL / ALK PHOS: 97 U/L / ALT: 46 U/L / AST: 42 U/L / GGT: x         PT/INR - ( 19 May 2021 13:26 )   PT: 15.9 sec;   INR: 1.38 ratio         PTT - ( 19 May 2021 13:26 )  PTT:30.9 secCBC Full  -  ( 19 May 2021 13:26 )  WBC Count : 15.11 K/uL  Hemoglobin : 11.2 g/dL  Hematocrit : 34.1 %  Platelet Count - Automated : 262 K/uL  Mean Cell Volume : 61.7 fl  Mean Cell Hemoglobin : 20.3 pg  Mean Cell Hemoglobin Concentration : 32.8 gm/dL  Auto Neutrophil # : 13.56 K/uL  Auto Lymphocyte # : 0.62 K/uL  Auto Monocyte # : 0.81 K/uL  Auto Eosinophil # : 0.00 K/uL  Auto Basophil # : 0.02 K/uL  Auto Neutrophil % : 89.7 %  Auto Lymphocyte % : 4.1 %  Auto Monocyte % : 5.4 %  Auto Eosinophil % : 0.0 %  Auto Basophil % : 0.1 %  CARDIAC MARKERS ( 19 May 2021 13:26 )  0.034 ng/mL / x     / x     / x     / x                PT/INR - ( 19 May 2021 13:26 )   PT: 15.9 sec;   INR: 1.38 ratio         PTT - ( 19 May 2021 13:26 )  PTT:30.9 sec          Assessment and Plan:     88 yo M with a PMH hypothyroidism (due to thyroidectomy), 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    1- Community-acquired bacterial pneumonia/Sepsis/Lactic Acidosis  - Patient meets sepsis criteria with leukocytosis and tachycardia (although unclear if WBC may partly be due to leukemia)  - With RLL bacterial pneumonia  - Lactate 2.5, improved to 1.9  - Given Ceftriaxone/Azithromycin, will continue IV  - F/u blood cx        2) Hairy Cell Leukemia  -no acute intervention at this time  -hb normal      3- Hypothyroidism  - C/w Levothyroxine 175 ucg QD  - TSH normal    4-dvt prophy - sc lovenox    5-failrue to thrive  -severe cachexia, poor oral intake, issues with swallowing , severe overall malnutrition  -palliative consult for goals of care

## 2021-05-20 NOTE — PHYSICAL THERAPY INITIAL EVALUATION ADULT - MODALITIES TREATMENT COMMENTS
pt left seated in chair post Eval; chair alarm donned; callbell in reach; pt instructed not to get up alone; call nursing for assist; suni well; denied pain; RN Sonia made aware pt OOB

## 2021-05-20 NOTE — SWALLOW BEDSIDE ASSESSMENT ADULT - COMMENTS
The pt was admitted to . Hospital course is notable for lactic acidosis, sepsis, and right lower lobe pneumonia. This profile is superimposed upon a selected prior history which is remarkable for failure to thrive, Hairy Cell Leukemia, ETOH abuse, GERD, BPH s/p surgery, hypothyroidism s/p thyroidectomy NOS, previous CVA, and prior tonsillectomy. The patient was admitted to . Hospital course is notable for lactic acidosis, sepsis, and right lower lobe pneumonia. This profile is superimposed upon a selected prior history which is remarkable for failure to thrive, Hairy Cell Leukemia, ETOH abuse, GERD, BPH s/p surgery, hypothyroidism s/p thyroidectomy NOS, previous CVA, and prior tonsillectomy.

## 2021-05-20 NOTE — SWALLOW BEDSIDE ASSESSMENT ADULT - SWALLOW EVAL: DIAGNOSIS
1) The pt demonstrates Oropharyngeal Dysphagia which subjectively appeared to be a functional condition with a restricted inventory of modified food consistencies. Overt aspiration signs noted w/liquids less viscous than honey consistency. Dysphagia in setting of acute medical deconditioning(i.e sepsis, pneumonia), muscle wasting, failure to thrive w/underlying leukemia, ETOH abuse, & prior non acute stroke.   2) The pt is alert. Affect is flat. He is communicatively passive & irritable at times. Pt able to verbalize during communicative probes without evidence of a primary speech-language pathology, albeit somewhat brief/vague. Verbal brevity/vagueness likely related to encephalopathy associated with acute illness/sepsis/PNA as well as underlying ETOH abuse.

## 2021-05-20 NOTE — SWALLOW BEDSIDE ASSESSMENT ADULT - PHARYNGEAL PHASE
Swallow trigger was timely to mildly latent. Laryngeal lift on palpation during swallowing trials was mildly to moderately reduced but felt to be grossly functional with some of the above modified food textures. Post prandial coughing exhibited with nectar thick liquids, despite cues to employ compensatory swallowing maneuvers. NO behavioral aspiration signs exhibited with honey thick liquids, puree foods and mechanical soft solids.

## 2021-05-20 NOTE — SWALLOW BEDSIDE ASSESSMENT ADULT - ASR SWALLOW RECOMMEND DIAG
DEFER MBS AS PT APPEARED CLINICALLY TOLERANT OF SUGGESTED PO TEXTURES FROM AN OROPHARYNGEAL SWALLOWING STANCE, DYSPHAGIA WITH A PROPENSITY TO FLUCTUATE IN SEVERITY GIVEN PROFILE AND CONSIDERING THAT STIMULABILITY FOR EFFECTIVE USE OF COMPENSATORY SWALLOWING MANEUVERS. d DEFER MBS AS PT APPEARED CLINICALLY TOLERANT OF SUGGESTED PO TEXTURES FROM AN OROPHARYNGEAL SWALLOWING STANCE, DYSPHAGIA WITH A PROPENSITY TO FLUCTUATE IN SEVERITY GIVEN PROFILE AND CONSIDERING THAT STIMULABILITY FOR EFFECTIVE USE OF COMPENSATORY SWALLOWING MANEUVERS.

## 2021-05-20 NOTE — PHYSICAL THERAPY INITIAL EVALUATION ADULT - REFERRING PHYSICIAN, REHAB EVAL
pt ambulatory in er ad jeremy called her  to pick her up pt declined any further work up to Dr Thompson and ER resident
Aryles Hedjar

## 2021-05-20 NOTE — DIETITIAN INITIAL EVALUATION ADULT. - MALNUTRITION
severe malnutrition in acute on chronic illness severe malnutrition in acute on chronic illness r/t decreased PO intake 2/2 infection with CVA AEB severe muscle/fat wasting; meeting <50% of ENN x 1 wk

## 2021-05-20 NOTE — DIETITIAN INITIAL EVALUATION ADULT. - OTHER INFO
88yo male with PMH significant for hypothyroidism, hairy cell leukemia, BPH, GERD, and CVA p/w weakness.  Pt admitted with CAP, sepsis, lactic acidosis, leukocytosis and tachycardia.  RLL bacterial PNA. Rt sided weakness.  failure to thrive in adult/dysphagia, alcohol abuse.  Pt full code.    *pending SLP eval.

## 2021-05-20 NOTE — CONSULT NOTE ADULT - PROBLEM SELECTOR RECOMMENDATION 9
Status post cladribine therapy twice  Patient currently not on chemotherapy as His CBC is acceptable and he does not Have any indication for retreatment    Hemoglobin above 10 platelets above 100    He also has element of microcytic anemia due to known thalassemia trait

## 2021-05-20 NOTE — DIETITIAN NUTRITION RISK NOTIFICATION - ADDITIONAL COMMENTS/DIETITIAN RECOMMENDATIONS
1) add ensure enlive chocolate TID (with consistency per SLP)   2) add vitamin C 500mg BID and zinc sulfate 220mg BID x 10 days to optimize wound healing   3) add vitamin D supplementation (pt with very low vitamin D level )   4) monitor BM; if > 3 days without BM, add bowel regimen   5) daily wt checks to track/trend changes   6) consider d/w family and pt about nutr support for future GOC

## 2021-05-20 NOTE — SWALLOW BEDSIDE ASSESSMENT ADULT - ASR SWALLOW LINGUAL MOBILITY
Effort was reduced when executing volitional lingual actions without evidence of a anna tongue focality.

## 2021-05-20 NOTE — DIETITIAN INITIAL EVALUATION ADULT. - PERTINENT LABORATORY DATA
05-19    136  |  102  |  23  ----------------------------<  201<H>  3.7   |  30  |  0.81    Ca    8.7      19 May 2021 13:26    TPro  6.5  /  Alb  2.2<L>  /  TBili  1.2  /  DBili  x   /  AST  42<H>  /  ALT  46  /  AlkPhos  97  05-19  BMI: BMI (kg/m2): 18.1 (05-19-21 @ 13:20)  HbA1c: A1C with Estimated Average Glucose Result: 5.2 % (05-20-21 @ 07:48)    Glucose: POCT Blood Glucose.: 172 mg/dL (05-20-21 @ 12:20)    Vitamin D, 25-Hydroxy: 18.1 ng/mL (05-20-21 @ 07:48)  Vitamin B12, Serum: 1200 pg/mL (05-20-21 @ 07:48)  Folate, Serum: 3.7 ng/mL (05-20-21 @ 07:48)  Vitamin B12, Serum: 482 pg/mL (12-17-19 @ 06:41)

## 2021-05-20 NOTE — SWALLOW BEDSIDE ASSESSMENT ADULT - ASR SWALLOW LABIAL MOBILITY
Effort was reduced when executing volitional labial actions without evidence of a anna lip focality.

## 2021-05-20 NOTE — CONSULT NOTE ADULT - SUBJECTIVE AND OBJECTIVE BOX
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 He has been feeling very weak for the past week. He has not eaten much and has lost weight. He also endorses a dry cough intermittently over the last week. He denies fevers, chills, SOB, chest pain, nausea, vomiting, abdominal pain, diarrhea, constipation, urinary complaints, rash, or swelling. He has hairy cell leukemia for which he received 3 treatments, the last one 2-3 years ago. He is not sure why he has not received more treatments.  He states normally he is active. He ambulates with a stroller but has had no recent falls.    He states has no known history of stroke but daughter states he HAS had one about 1.5 years ago with slurred speech and weakness.  Per daughter, he has been totally homebound.    In the ED, he was given Ceftriaxone 1 g IV x1, Azithromycin 500 mg IV x1, and NS 1L x2. (19 May 2021 18:11)      PAST MEDICAL & SURGICAL HISTORY:  Hypothyroidism  s/p thyroidectomy    Hairy cell leukemia    GERD (gastroesophageal reflux disease)    BPH (benign prostatic hyperplasia)    Alcohol abuse  per daughter    Cerebrovascular accident (CVA)  per daughter, in 2019    History of tonsillectomy    History of thyroidectomy    History of prostatectomy        REVIEW OF SYSTEMS    General:	  Allergies    No Known Allergies    Intolerances        Occupation:  [employer]: Denied Smoking: Denied Drug Use: Denied  Marital Status:         FAMILY HISTORY:  Family history of cancer of genitourinary system (Sibling)        Home Medications:  levothyroxine 175 mcg (0.175 mg) oral tablet: 1 tab(s) orally once a day (19 May 2021 16:30)      MEDICATIONS  (STANDING):  azithromycin  IVPB 500 milliGRAM(s) IV Intermittent every 24 hours  cefTRIAXone Injectable. 1000 milliGRAM(s) IV Push every 24 hours  dextrose 40% Gel 15 Gram(s) Oral once  dextrose 50% Injectable 25 Gram(s) IV Push once  dextrose 50% Injectable 12.5 Gram(s) IV Push once  dextrose 50% Injectable 25 Gram(s) IV Push once  enoxaparin Injectable 40 milliGRAM(s) SubCutaneous daily  folic acid 1 milliGRAM(s) Oral daily  glucagon  Injectable 1 milliGRAM(s) IntraMuscular once  insulin lispro (ADMELOG) corrective regimen sliding scale   SubCutaneous three times a day before meals  insulin lispro (ADMELOG) corrective regimen sliding scale   SubCutaneous at bedtime  levothyroxine 175 MICROGram(s) Oral daily  multivitamin 1 Tablet(s) Oral daily  thiamine 100 milliGRAM(s) Oral daily    MEDICATIONS  (PRN):  LORazepam   Injectable 1 milliGRAM(s) IV Push every 2 hours PRN CIWA-Ar score increase by 2 points and a total score of 7 or less  LORazepam   Injectable 1 milliGRAM(s) IV Push every 1 hour PRN CIWA-Ar score 8 or greater      PHYSICAL EXAM:  Vital Signs Last 24 Hrs  T(C): 36.7 (20 May 2021 08:30), Max: 37.3 (19 May 2021 23:25)  T(F): 98 (20 May 2021 08:30), Max: 99.1 (19 May 2021 23:25)  HR: 84 (20 May 2021 08:30) (84 - 107)  BP: 126/50 (20 May 2021 08:30) (115/51 - 145/62)  BP(mean): 87 (19 May 2021 19:21) (87 - 87)  RR: 17 (20 May 2021 08:30) (17 - 22)  SpO2: 95% (20 May 2021 08:30) (95% - 97%)  Gen:           LABS:                        11.2   15.11 )-----------( 262      ( 19 May 2021 13:26 )             34.1     19 May 2021 13:26    136    |  102    |  23     ----------------------------<  201    3.7     |  30     |  0.81     Ca    8.7        19 May 2021 13:26    TPro  6.5    /  Alb  2.2    /  TBili  1.2    /  DBili  x      /  AST  42     /  ALT  46     /  AlkPhos  97     19 May 2021 13:26    LIVER FUNCTIONS - ( 19 May 2021 13:26 )  Alb: 2.2 g/dL / Pro: 6.5 gm/dL / ALK PHOS: 97 U/L / ALT: 46 U/L / AST: 42 U/L / GGT: x           PT/INR - ( 19 May 2021 13:26 )   PT: 15.9 sec;   INR: 1.38 ratio         PTT - ( 19 May 2021 13:26 )  PTT:30.9 sec      RADIOLOGY & ADDITIONAL STUDIES: Patient is a 87y old  Male who presents with a chief complaint of pneumonia (20 May 2021 09:45)      HPI:  88 yo M with a PMH hypothyroidism (due to thyroidectomy), 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 He has been feeling very weak for the past week. He has not eaten much and has lost weight. He also endorses a dry cough intermittently over the last week. He denies fevers, chills, SOB, chest pain, nausea, vomiting, abdominal pain, diarrhea, constipation, urinary complaints, rash, or swelling.     Patient was diagnosed with hairy cell leukemia in 2012 when he had presented with pancytopenia  He had 60% bone marrow involvement  Patient was treated with 2 CT with very good response Documented on repeat bone marrow biopsy  He was treated again with cladribine in 2017 for recurrence And has not required any further treatment as he was in remission    Patient has had progressive dementia, difficulty swallowing and loss of weight  He was last seen in our office in 2018      He is now admitted with right lower lobe pneumonia  Progressive weight loss and forgetfulness      PAST MEDICAL & SURGICAL HISTORY:  Hypothyroidism  s/p thyroidectomy    Hairy cell leukemia    GERD (gastroesophageal reflux disease)    BPH (benign prostatic hyperplasia)    Alcohol abuse  per daughter    Cerebrovascular accident (CVA)  per daughter, in 2019    History of tonsillectomy    History of thyroidectomy    History of prostatectomy        REVIEW OF SYSTEMS    General:	  Allergies    No Known Allergies    Intoleranc        FAMILY HISTORY:  Family history of cancer of genitourinary system (Sibling)        Home Medications:  levothyroxine 175 mcg (0.175 mg) oral tablet: 1 tab(s) orally once a day (19 May 2021 16:30)      MEDICATIONS  (STANDING):  azithromycin  IVPB 500 milliGRAM(s) IV Intermittent every 24 hours  cefTRIAXone Injectable. 1000 milliGRAM(s) IV Push every 24 hours  dextrose 40% Gel 15 Gram(s) Oral once  dextrose 50% Injectable 25 Gram(s) IV Push once  dextrose 50% Injectable 12.5 Gram(s) IV Push once  dextrose 50% Injectable 25 Gram(s) IV Push once  enoxaparin Injectable 40 milliGRAM(s) SubCutaneous daily  folic acid 1 milliGRAM(s) Oral daily  glucagon  Injectable 1 milliGRAM(s) IntraMuscular once  insulin lispro (ADMELOG) corrective regimen sliding scale   SubCutaneous three times a day before meals  insulin lispro (ADMELOG) corrective regimen sliding scale   SubCutaneous at bedtime  levothyroxine 175 MICROGram(s) Oral daily  multivitamin 1 Tablet(s) Oral daily  thiamine 100 milliGRAM(s) Oral daily    MEDICATIONS  (PRN):  LORazepam   Injectable 1 milliGRAM(s) IV Push every 2 hours PRN CIWA-Ar score increase by 2 points and a total score of 7 or less  LORazepam   Injectable 1 milliGRAM(s) IV Push every 1 hour PRN CIWA-Ar score 8 or greater    ROS:    Unable to obtain as the patient is not able to answer questions  having difficulty swallloing        PHYSICAL EXAM:  Vital Signs Last 24 Hrs  T(C): 36.7 (20 May 2021 08:30), Max: 37.3 (19 May 2021 23:25)  T(F): 98 (20 May 2021 08:30), Max: 99.1 (19 May 2021 23:25)  HR: 84 (20 May 2021 08:30) (84 - 107)  BP: 126/50 (20 May 2021 08:30) (115/51 - 145/62)  BP(mean): 87 (19 May 2021 19:21) (87 - 87)  RR: 17 (20 May 2021 08:30) (17 - 22)  SpO2: 95% (20 May 2021 08:30) (95% - 97%)      Gen:   Patient confused  Having difficulty swallowing  Unable to provide history  Patient looks emaciated  Crackles at the right base        LABS:                        11.2   15.11 )-----------( 262      ( 19 May 2021 13:26 )             34.1     19 May 2021 13:26    136    |  102    |  23     ----------------------------<  201    3.7     |  30     |  0.81     Ca    8.7        19 May 2021 13:26    TPro  6.5    /  Alb  2.2    /  TBili  1.2    /  DBili  x      /  AST  42     /  ALT  46     /  AlkPhos  97     19 May 2021 13:26    LIVER FUNCTIONS - ( 19 May 2021 13:26 )  Alb: 2.2 g/dL / Pro: 6.5 gm/dL / ALK PHOS: 97 U/L / ALT: 46 U/L / AST: 42 U/L / GGT: x           PT/INR - ( 19 May 2021 13:26 )   PT: 15.9 sec;   INR: 1.38 ratio         PTT - ( 19 May 2021 13:26 )  PTT:30.9 sec      RADIOLOGY & ADDITIONAL STUDIES:    Chest x-ray showing right lower lobe infiltrate

## 2021-05-20 NOTE — SWALLOW BEDSIDE ASSESSMENT ADULT - SWALLOW EVAL: RECOMMENDED DIET
SUGGEST A DYSPHAGIA 2 DIET WITH HONEY THICK LIQUIDS AS THIS IS THE LEAST RESTRICTIVE TOLERABLE DIET CONSISTENCIES FROM AN OROPHARYNGEAL SWALLOWING PERSPECTIVE.

## 2021-05-21 NOTE — CONSULT NOTE ADULT - SUBJECTIVE AND OBJECTIVE BOX
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 He has been feeling very weak for the past week. He has not eaten much and has lost weight. He also endorses a dry cough intermittently over the last week. He denies fevers, chills, SOB, chest pain, nausea, vomiting, abdominal pain, diarrhea, constipation, urinary complaints, rash, or swelling. He has hairy cell leukemia for which he received 3 treatments, the last one 2-3 years ago. Patient states that at home he uses a walker but has no trouble getting around.     5/21/2021 patient seen and examined with no family at bedside, patient is calm but stating that he is very tired, and that recently he has been increasingly weaker, patient endorses that he has been able to care for himself at home and that he lives with his wife, patient also stating that he has not been out of bed since being in the hospital. PT did see patient which patient does not recall. Will reach out to family to schedule GOC meeting.     PAIN: ( )Yes   (x )No  DYSPNEA: ( ) Yes  (x ) No    PAST MEDICAL & SURGICAL HISTORY:  Hypothyroidism  s/p thyroidectomy  Hairy cell leukemia  GERD (gastroesophageal reflux disease)  BPH (benign prostatic hyperplasia)  Alcohol abuse per daughter  Cerebrovascular accident (CVA) per daughter, in 2019  History of tonsillectomy  History of thyroidectomy  History of prostatectomy    SOCIAL HX: lives at home as per chart has been mainly bedbound     Hx opiate tolerance ( )YES  (x )NO    Baseline ADLs  (Prior to Admission)  (x ) Independent   ( )Dependent  with some assistance as per patient     FAMILY HISTORY:  Family history of cancer of genitourinary system (Sibling)    Review of Systems:    Anxiety- no   Depression-no  Physical Discomfort- no   Dyspnea- no   Constipation- no   Diarrhea- no   Nausea- no   Vomiting- no   Anorexia- yes   Weight Loss- yes   Cough- yes   Secretions- no   Fatigue- yes   Weakness- yes   Delirium- no     All other systems reviewed and negative    PHYSICAL EXAM:    Vital Signs Last 24 Hrs  T(C): 36.9 (21 May 2021 07:56), Max: 37.4 (20 May 2021 20:31)  T(F): 98.4 (21 May 2021 07:56), Max: 99.4 (20 May 2021 20:31)  HR: 84 (21 May 2021 07:56) (83 - 96)  BP: 108/56 (21 May 2021 07:56) (108/56 - 138/46)  RR: 17 (21 May 2021 07:56) (17 - 18)  SpO2: 96% (21 May 2021 07:56) (94% - 97%)    PPSV2: 40  %  FAST: no documented history of dementia     General: pleasant cachectic male in bed, NAD   Mental Status: alert and oriented with forgetfulness   HEENT: dry oral mucosa, + temporal and clavicular wasting   Lungs: diminished breath sounds   Cardiac: S1S2 +   GI: nontender, nondistended, +BS   : +voiding   Ext: no edema   Neuro: forgetful, weakness     LABS:                        11.2   15.11 )-----------( 262      ( 19 May 2021 13:26 )             34.1     05-19    136  |  102  |  23  ----------------------------<  201<H>  3.7   |  30  |  0.81    Ca    8.7      19 May 2021 13:26    TPro  6.5  /  Alb  2.2<L>  /  TBili  1.2  /  DBili  x   /  AST  42<H>  /  ALT  46  /  AlkPhos  97  05-19    PT/INR - ( 19 May 2021 13:26 )   PT: 15.9 sec;   INR: 1.38 ratio       PTT - ( 19 May 2021 13:26 )  PTT:30.9 sec  Albumin: Albumin, Serum: 2.2 g/dL (05-19 @ 13:26)    Allergies- No Known Allergies    MEDICATIONS  (STANDING):  azithromycin  IVPB 500 milliGRAM(s) IV Intermittent every 24 hours  cefTRIAXone Injectable. 1000 milliGRAM(s) IV Push every 24 hours  dextrose 40% Gel 15 Gram(s) Oral once  dextrose 50% Injectable 25 Gram(s) IV Push once  dextrose 50% Injectable 12.5 Gram(s) IV Push once  dextrose 50% Injectable 25 Gram(s) IV Push once  enoxaparin Injectable 40 milliGRAM(s) SubCutaneous daily  folic acid 1 milliGRAM(s) Oral daily  glucagon  Injectable 1 milliGRAM(s) IntraMuscular once  levothyroxine 175 MICROGram(s) Oral daily  multivitamin 1 Tablet(s) Oral daily  thiamine 100 milliGRAM(s) Oral daily    MEDICATIONS  (PRN):  LORazepam   Injectable 1 milliGRAM(s) IV Push every 2 hours PRN CIWA-Ar score increase by 2 points and a total score of 7 or less  LORazepam   Injectable 1 milliGRAM(s) IV Push every 1 hour PRN CIWA-Ar score 8 or greater      RADIOLOGY/ADDITIONAL STUDIES:    EXAM:  US ABDOMEN RT UPR QUADRANT                        PROCEDURE DATE:  05/19/2021    INTERPRETATION:  CLINICAL INFORMATION: Alcohol abuse. Rule out cirrhosis or hepatitis.  COMPARISON: None available.  TECHNIQUE: Sonography of the right upper quadrant.  FINDINGS: Liver: Within normal limits.  Bile ducts: Normal caliber. Common bile duct measures 8 mm which is within normal limits given patient's age.  Gallbladder: Status post cholecystectomy.  Pancreas: Pancreatic head/neck cystic lesions measuring 0.7 cm and 0.5 cm.  Right kidney: 9.1 cm. No hydronephrosis. Midpole cyst measuring 1.9 x 2.2 x 1.7 cm and mid to lower pole cyst measuring 0.7 x 0.6 x 0.7 cm.  Ascites: None.  Aorta/IVC: Visualized portions are within normal limits.  Miscellaneous: Right pleural effusion.    IMPRESSION:    1. No sonographic evidence of cirrhosis.  2. Small pancreatic head/neck cystic lesions.  3. Right pleural effusion.    EXAM:  CT BRAIN                        PROCEDURE DATE:  05/19/2021    INTERPRETATION:  CT head without IV contrast  CLINICAL INFORMATION: Altered mental status  TECHNIQUE: Contiguous axial 5 mm sections were obtained through the head.Sagittal and coronal 2-D reformatted images were also obtained.   This scan was performed using automatic exposure control (radiation dose reduction software) to obtain a diagnostic image quality scan with patient dose as low as reasonably achievable.    FINDINGS:   CT dated 12/16/2019 available for review. Patient motion degrades image quality.  The brain demonstrates moderate periventricular white matter ischemia.   No acute cerebral cortical infarct is seen.  No intracranial hemorrhage is found.  No mass effect is found in the brain.  The ventricles, sulci and basal cisterns appear unremarkable.  The orbits show LEFT globe and eyelid.  The paranasal sinuses are clear.  The nasal cavity appears intact.  The nasopharynx is symmetric.  The central skull base, petrous temporal bones and calvarium remain intact.      IMPRESSION:   Moderate periventricular white matter ischemia noted. Patient motion degrades image quality.      EXAM:  XR CHEST PORTABLE URGENT 1V                        PROCEDURE DATE:  05/19/2021    INTERPRETATION:  AP chest on May 19, 2021 at 3:28 PM. 2 images. Patient has weakness and weight loss.  Heart magnified by technique.  There is a sizable infiltrate in the right mid lower lung field new since August 10, 2020.  Clips in right upper quadrant noted.    IMPRESSION: Right lower lung field infiltrate.

## 2021-05-21 NOTE — PROGRESS NOTE ADULT - SUBJECTIVE AND OBJECTIVE BOX
Cheif complaints and Diagnosis: CAP/sepsis/ cachexia/ failure to thrive    Subjective: no complaints      REVIEW OF SYSTEMS:    CONSTITUTIONAL: No weakness, fevers or chills  EYES/ENT: No visual changes;  No vertigo or throat pain   NECK: No pain or stiffness  RESPIRATORY: No cough, wheezing, hemoptysis; No shortness of breath  CARDIOVASCULAR: No chest pain or palpitations  GASTROINTESTINAL: No abdominal or epigastric pain. No nausea, vomiting, or hematemesis; No diarrhea or constipation. No melena or hematochezia.  GENITOURINARY: No dysuria, frequency or hematuria  NEUROLOGICAL: No numbness or weakness  SKIN: No itching, burning, rashes, or lesions   All other review of systems is negative unless indicated above      Vital Signs Last 24 Hrs  T(C): 36.9 (21 May 2021 07:56), Max: 37.4 (20 May 2021 20:31)  T(F): 98.4 (21 May 2021 07:56), Max: 99.4 (20 May 2021 20:31)  HR: 84 (21 May 2021 07:56) (83 - 91)  BP: 108/56 (21 May 2021 07:56) (108/56 - 138/46)  BP(mean): --  RR: 17 (21 May 2021 07:56) (17 - 18)  SpO2: 96% (21 May 2021 07:56) (95% - 97%)    HEENT:   pupils equal and reactive, EOMI, no oropharyngeal lesions, erythema, exudates, oral thrush    NECK:   supple, no carotid bruits, no palpable lymph nodes, no thyromegaly    CV:  +S1, +S2, regular, no murmurs or rubs    RESP:   lungs clear to auscultation bilaterally, no wheezing, rales, rhonchi, good air entry bilaterally    BREAST:  not examined    GI:  abdomen soft, non-tender, non-distended, normal BS, no bruits, no abdominal masses, no palpable masses    RECTAL:  not examined    :  not examined    MSK:   normal muscle tone, no atrophy, no rigidity, no contractions    EXT:   no clubbing, no cyanosis, no edema, no calf pain, swelling or erythema    VASCULAR:  pulses equal and symmetric in the upper and lower extremities    NEURO:  AAOX3, no focal neurological deficits, follows all commands, able to move extremities spontaneously    SKIN:  no ulcers, lesions or rashes    MEDICATIONS  (STANDING):  azithromycin  IVPB 500 milliGRAM(s) IV Intermittent every 24 hours  cefTRIAXone Injectable. 1000 milliGRAM(s) IV Push every 24 hours  dextrose 40% Gel 15 Gram(s) Oral once  dextrose 50% Injectable 25 Gram(s) IV Push once  dextrose 50% Injectable 12.5 Gram(s) IV Push once  dextrose 50% Injectable 25 Gram(s) IV Push once  enoxaparin Injectable 40 milliGRAM(s) SubCutaneous daily  folic acid 1 milliGRAM(s) Oral daily  glucagon  Injectable 1 milliGRAM(s) IntraMuscular once  levothyroxine 175 MICROGram(s) Oral daily  multivitamin 1 Tablet(s) Oral daily  thiamine 100 milliGRAM(s) Oral daily    MEDICATIONS  (PRN):  LORazepam   Injectable 1 milliGRAM(s) IV Push every 2 hours PRN CIWA-Ar score increase by 2 points and a total score of 7 or less  LORazepam   Injectable 1 milliGRAM(s) IV Push every 1 hour PRN CIWA-Ar score 8 or greater      21 May 2021 07:48    136    |  104    |  16     ----------------------------<  106    3.8     |  28     |  0.56     Ca    8.2        21 May 2021 07:48    CBC Full  -  ( 21 May 2021 07:48 )  WBC Count : 14.58 K/uL  Hemoglobin : 9.4 g/dL  Hematocrit : 28.3 %  Platelet Count - Automated : 244 K/uL  Mean Cell Volume : 61.1 fl  Mean Cell Hemoglobin : 20.3 pg  Mean Cell Hemoglobin Concentration : 33.2 gm/dL          Assessment and Plan:     88 yo M with a PMH hypothyroidism (due to thyroidectomy), 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    1- Community-acquired bacterial pneumonia/Sepsis/Lactic Acidosis  - Patient meets sepsis criteria with leukocytosis and tachycardia (although unclear if WBC may partly be due to leukemia)  - With RLL bacterial pneumonia  - Lactate 2.5, improved to 1.9  - Given Ceftriaxone/Azithromycin, will continue IV  - F/u blood cx        2) Hairy Cell Leukemia  -no acute intervention at this time  -hb normal      3- Hypothyroidism  - C/w Levothyroxine 175 ucg QD  - TSH normal    4-dvt prophy - sc lovenox    5-failrue to thrive  -severe cachexia, poor oral intake, issues with swallowing , severe overall malnutrition  -palliative consult for goals of care  -currently patient is FULL CODE.  -GOC on  monday; if family and patient want all agressive care, then will need a PEG tube , as of current, patient is severely malnurished.

## 2021-05-21 NOTE — CONSULT NOTE ADULT - ASSESSMENT
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    2) Hairy Cell Leukemia  - Patient was diagnosed with hairy cell leukemia in 2012   - oncology consult appreciate   - to monitor H/H   - Status post cladribine therapy twice  - as per oncology patient currently not on chemotherapy does not Have any indication for retreatment    3) Severe protein calorie malnutrition   - patient severely cachetic   - Albumin, Serum: 2.2 g/dL  - patient states that he just has no appetite anymore   - speech and swallow consult     4) history of ETOH abuse   - as per daughter 2-3 drinks daily ( usually martinis)   - patient on CIWA protocol as per Primary team   - u/s abdomen -  No sonographic evidence of cirrhosis,  Small pancreatic head/neck cystic lesions, Right pleural effusion    5) Debility   - PPSV2: 40  %  - PT consult appreciated    - falls risk   - supportive care     5) Advance care planning   - limited capacity secondary to forgetfulness and [place], patient alert and oriented but has limited insight on his medical history   - patient lives at home with his wife ( who has advance dementia) and 24 HHA   - daughter lives in [place] and helps patient make medical decisions, although they have not been able to visit in over a year secondary to covid and states that her dad has been mostly house bound since last august she isnt sure if he has left the house.   - GOC Monday at 3pm with daughter over the telephone

## 2021-05-22 NOTE — PROGRESS NOTE ADULT - SUBJECTIVE AND OBJECTIVE BOX
Cheif complaints and Diagnosis: CAP/ poor oral intake    Subjective: no complaints      REVIEW OF SYSTEMS:    CONSTITUTIONAL: No weakness, fevers or chills  EYES/ENT: No visual changes;  No vertigo or throat pain   NECK: No pain or stiffness  RESPIRATORY: No cough, wheezing, hemoptysis; No shortness of breath  CARDIOVASCULAR: No chest pain or palpitations  GASTROINTESTINAL: No abdominal or epigastric pain. No nausea, vomiting, or hematemesis; No diarrhea or constipation. No melena or hematochezia.  GENITOURINARY: No dysuria, frequency or hematuria  NEUROLOGICAL: No numbness or weakness  SKIN: No itching, burning, rashes, or lesions   All other review of systems is negative unless indicated above      Vital Signs Last 24 Hrs  T(C): 36.8 (22 May 2021 12:34), Max: 37.1 (21 May 2021 23:12)  T(F): 98.2 (22 May 2021 12:34), Max: 98.7 (21 May 2021 23:12)  HR: 84 (22 May 2021 12:34) (82 - 87)  BP: 126/52 (22 May 2021 12:34) (119/44 - 141/50)  BP(mean): --  RR: 18 (22 May 2021 12:34) (17 - 18)  SpO2: 97% (22 May 2021 12:34) (96% - 97%)    HEENT:   pupils equal and reactive, EOMI, no oropharyngeal lesions, erythema, exudates, oral thrush    NECK:   supple, no carotid bruits, no palpable lymph nodes, no thyromegaly    CV:  +S1, +S2, regular, no murmurs or rubs    RESP:   lungs clear to auscultation bilaterally, no wheezing, rales, rhonchi, good air entry bilaterally    BREAST:  not examined    GI:  abdomen soft, non-tender, non-distended, normal BS, no bruits, no abdominal masses, no palpable masses    RECTAL:  not examined    :  not examined    MSK:   normal muscle tone, no atrophy, no rigidity, no contractions    EXT:   no clubbing, no cyanosis, no edema, no calf pain, swelling or erythema    VASCULAR:  pulses equal and symmetric in the upper and lower extremities    NEURO:  AAOX3, no focal neurological deficits, follows all commands, able to move extremities spontaneously    SKIN:  no ulcers, lesions or rashes    MEDICATIONS  (STANDING):  azithromycin  IVPB 500 milliGRAM(s) IV Intermittent every 24 hours  cefTRIAXone Injectable. 1000 milliGRAM(s) IV Push every 24 hours  dextrose 40% Gel 15 Gram(s) Oral once  dextrose 50% Injectable 25 Gram(s) IV Push once  dextrose 50% Injectable 12.5 Gram(s) IV Push once  dextrose 50% Injectable 25 Gram(s) IV Push once  enoxaparin Injectable 40 milliGRAM(s) SubCutaneous daily  folic acid 1 milliGRAM(s) Oral daily  glucagon  Injectable 1 milliGRAM(s) IntraMuscular once  levothyroxine 175 MICROGram(s) Oral daily  multivitamin 1 Tablet(s) Oral daily    MEDICATIONS  (PRN):  LORazepam   Injectable 1 milliGRAM(s) IV Push every 2 hours PRN CIWA-Ar score increase by 2 points and a total score of 7 or less  LORazepam   Injectable 1 milliGRAM(s) IV Push every 1 hour PRN CIWA-Ar score 8 or greater      21 May 2021 07:48    136    |  104    |  16     ----------------------------<  106    3.8     |  28     |  0.56     Ca    8.2        21 May 2021 07:48    CBC Full  -  ( 21 May 2021 07:48 )  WBC Count : 14.58 K/uL  Hemoglobin : 9.4 g/dL  Hematocrit : 28.3 %  Platelet Count - Automated : 244 K/uL  Mean Cell Volume : 61.1 fl  Mean Cell Hemoglobin : 20.3 pg  Mean Cell Hemoglobin Concentration : 33.2 gm/dL          Assessment and Plan:     86 yo M with a PMH hypothyroidism (due to thyroidectomy), 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    1- Community-acquired bacterial pneumonia/Sepsis/Lactic Acidosis  - Patient meets sepsis criteria with leukocytosis and tachycardia (although unclear if WBC may partly be due to leukemia)  - With RLL bacterial pneumonia  - Lactate 2.5, improved to 1.9  - Given Ceftriaxone/Azithromycin, will continue IV  - F/u blood cx    2) Hairy Cell Leukemia  -no acute intervention at this time  -hb normal      3- Hypothyroidism  - C/w Levothyroxine 175 ucg QD  - TSH normal    4-dvt prophy - sc lovenox    5-failrue to thrive  -severe cachexia, poor oral intake, issues with swallowing , severe overall malnutrition  -palliative consult for goals of care  -currently patient is FULL CODE.  -GOC on  monday; if family and patient want all agressive care, then will need a PEG tube , as of current, patient is severely malnurished.

## 2021-05-23 NOTE — PROGRESS NOTE ADULT - SUBJECTIVE AND OBJECTIVE BOX
Cheif complaints and Diagnosis: CAP/ sepsis/ failure to thrive    Subjective: afebrile past 48 hours      REVIEW OF SYSTEMS:    CONSTITUTIONAL: No weakness, fevers or chills  EYES/ENT: No visual changes;  No vertigo or throat pain   NECK: No pain or stiffness  RESPIRATORY: No cough, wheezing, hemoptysis; No shortness of breath  CARDIOVASCULAR: No chest pain or palpitations  GASTROINTESTINAL: No abdominal or epigastric pain. No nausea, vomiting, or hematemesis; No diarrhea or constipation. No melena or hematochezia.  GENITOURINARY: No dysuria, frequency or hematuria  NEUROLOGICAL: No numbness or weakness  SKIN: No itching, burning, rashes, or lesions   All other review of systems is negative unless indicated above      Vital Signs Last 24 Hrs  T(C): 36.9 (23 May 2021 08:44), Max: 37.2 (22 May 2021 16:00)  T(F): 98.4 (23 May 2021 08:44), Max: 98.9 (22 May 2021 16:00)  HR: 78 (23 May 2021 08:44) (78 - 98)  BP: 107/62 (23 May 2021 08:44) (100/41 - 126/52)  BP(mean): --  RR: 18 (23 May 2021 08:44) (18 - 18)  SpO2: 95% (23 May 2021 08:44) (95% - 97%)    HEENT:   pupils equal and reactive, EOMI, no oropharyngeal lesions, erythema, exudates, oral thrush    NECK:   supple, no carotid bruits, no palpable lymph nodes, no thyromegaly    CV:  +S1, +S2, regular, no murmurs or rubs    RESP:   lungs clear to auscultation bilaterally, no wheezing, rales, rhonchi, good air entry bilaterally    BREAST:  not examined    GI:  abdomen soft, non-tender, non-distended, normal BS, no bruits, no abdominal masses, no palpable masses    RECTAL:  not examined    :  not examined    MSK:   normal muscle tone, no atrophy, no rigidity, no contractions    EXT:   no clubbing, no cyanosis, no edema, no calf pain, swelling or erythema    VASCULAR:  pulses equal and symmetric in the upper and lower extremities    NEURO:  AAOX3, no focal neurological deficits, follows all commands, able to move extremities spontaneously    SKIN:  no ulcers, lesions or rashes    MEDICATIONS  (STANDING):  azithromycin  IVPB 500 milliGRAM(s) IV Intermittent every 24 hours  cefTRIAXone Injectable. 1000 milliGRAM(s) IV Push every 24 hours  dextrose 40% Gel 15 Gram(s) Oral once  dextrose 50% Injectable 25 Gram(s) IV Push once  dextrose 50% Injectable 12.5 Gram(s) IV Push once  dextrose 50% Injectable 25 Gram(s) IV Push once  enoxaparin Injectable 40 milliGRAM(s) SubCutaneous daily  folic acid 1 milliGRAM(s) Oral daily  glucagon  Injectable 1 milliGRAM(s) IntraMuscular once  levothyroxine 175 MICROGram(s) Oral daily  multivitamin 1 Tablet(s) Oral daily    MEDICATIONS  (PRN):  LORazepam   Injectable 1 milliGRAM(s) IV Push every 2 hours PRN CIWA-Ar score increase by 2 points and a total score of 7 or less  LORazepam   Injectable 1 milliGRAM(s) IV Push every 1 hour PRN CIWA-Ar score 8 or greater                Assessment and Plan:     86 yo M with a PMH hypothyroidism (due to thyroidectomy), 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    1- Community-acquired bacterial pneumonia/Sepsis/Lactic Acidosis  - Patient meets sepsis criteria with leukocytosis and tachycardia (although unclear if WBC may partly be due to leukemia)  - With RLL bacterial pneumonia  - Lactate 2.5, improved to 1.9  - Given Ceftriaxone/Azithromycin, will continue IV  - F/u blood cx    2) Hairy Cell Leukemia  -no acute intervention at this time  -hb normal      3- Hypothyroidism  - C/w Levothyroxine 175 ucg QD  - TSH normal    4-dvt prophy - sc lovenox    5-failrue to thrive  -severe cachexia, poor oral intake, issues with swallowing , severe overall malnutrition  -palliative consult for goals of care  -currently patient is FULL CODE.  -GOC on  monday; if family and patient want all agressive care, then will need a PEG tube , as of current, patient is severely malnurished.

## 2021-05-24 NOTE — PROGRESS NOTE ADULT - SUBJECTIVE AND OBJECTIVE BOX
Cheif complaints and Diagnosis: CAP/ sepsis/ failure to thrive/poor oral intake    Subjective:       REVIEW OF SYSTEMS:    CONSTITUTIONAL: No weakness, fevers or chills  EYES/ENT: No visual changes;  No vertigo or throat pain   NECK: No pain or stiffness  RESPIRATORY: No cough, wheezing, hemoptysis; No shortness of breath  CARDIOVASCULAR: No chest pain or palpitations  GASTROINTESTINAL: No abdominal or epigastric pain. No nausea, vomiting, or hematemesis; No diarrhea or constipation. No melena or hematochezia.  GENITOURINARY: No dysuria, frequency or hematuria  NEUROLOGICAL: No numbness or weakness  SKIN: No itching, burning, rashes, or lesions   All other review of systems is negative unless indicated above      Vital Signs Last 24 Hrs  T(C): 36.4 (24 May 2021 08:09), Max: 36.5 (23 May 2021 23:28)  T(F): 97.6 (24 May 2021 08:09), Max: 97.7 (23 May 2021 23:28)  HR: 70 (24 May 2021 08:09) (70 - 79)  BP: 117/55 (24 May 2021 08:09) (106/63 - 128/60)  BP(mean): --  RR: 17 (24 May 2021 08:09) (17 - 18)  SpO2: 98% (24 May 2021 08:09) (98% - 100%)    HEENT:   pupils equal and reactive, EOMI, no oropharyngeal lesions, erythema, exudates, oral thrush    NECK:   supple, no carotid bruits, no palpable lymph nodes, no thyromegaly    CV:  +S1, +S2, regular, no murmurs or rubs    RESP:   lungs clear to auscultation bilaterally, no wheezing, rales, rhonchi, good air entry bilaterally    BREAST:  not examined    GI:  abdomen soft, non-tender, non-distended, normal BS, no bruits, no abdominal masses, no palpable masses    RECTAL:  not examined    :  not examined    MSK:   normal muscle tone, no atrophy, no rigidity, no contractions    EXT:   no clubbing, no cyanosis, no edema, no calf pain, swelling or erythema    VASCULAR:  pulses equal and symmetric in the upper and lower extremities    NEURO:  AAOX3, no focal neurological deficits, follows all commands, able to move extremities spontaneously    SKIN:  no ulcers, lesions or rashes    MEDICATIONS  (STANDING):  azithromycin  IVPB 500 milliGRAM(s) IV Intermittent every 24 hours  cefTRIAXone Injectable. 1000 milliGRAM(s) IV Push every 24 hours  dextrose 40% Gel 15 Gram(s) Oral once  dextrose 50% Injectable 25 Gram(s) IV Push once  dextrose 50% Injectable 12.5 Gram(s) IV Push once  dextrose 50% Injectable 25 Gram(s) IV Push once  enoxaparin Injectable 40 milliGRAM(s) SubCutaneous daily  folic acid 1 milliGRAM(s) Oral daily  glucagon  Injectable 1 milliGRAM(s) IntraMuscular once  levothyroxine 175 MICROGram(s) Oral daily  multivitamin 1 Tablet(s) Oral daily  sodium chloride 0.9%. 1000 milliLiter(s) (70 mL/Hr) IV Continuous <Continuous>    MEDICATIONS  (PRN):  acetaminophen   Tablet .. 650 milliGRAM(s) Oral every 6 hours PRN Temp greater or equal to 38C (100.4F), Mild Pain (1 - 3)  LORazepam   Injectable 1 milliGRAM(s) IV Push every 2 hours PRN CIWA-Ar score increase by 2 points and a total score of 7 or less  LORazepam   Injectable 1 milliGRAM(s) IV Push every 1 hour PRN CIWA-Ar score 8 or greater  oxyCODONE    IR 5 milliGRAM(s) Oral three times a day PRN Severe Pain (7 - 10)  traMADol 25 milliGRAM(s) Oral every 6 hours PRN Moderate Pain (4 - 6)          CBC Full  -  ( 24 May 2021 08:41 )  WBC Count : 9.89 K/uL  Hemoglobin : 10.5 g/dL  Hematocrit : 32.1 %  Platelet Count - Automated : 331 K/uL  Mean Cell Volume : 61.5 fl  Mean Cell Hemoglobin : 20.1 pg  Mean Cell Hemoglobin Concentration : 32.7 gm/dL            Assessment and Plan:     88 yo M with a PMH hypothyroidism (due to thyroidectomy), 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    1- Community-acquired bacterial pneumonia/Sepsis/Lactic Acidosis  - Patient meets sepsis criteria with leukocytosis and tachycardia (although unclear if WBC may partly be due to leukemia)  - With RLL bacterial pneumonia  - Lactate 2.5, improved to 1.9  - Given Ceftriaxone/Azithromycin, will continue IV  -blood / urine negative    2) Hairy Cell Leukemia  -no acute intervention at this time  -hb normal      3- Hypothyroidism  - C/w Levothyroxine 175 ucg QD  - TSH normal    4-dvt prophy - sc lovenox    5-failrue to thrive  -severe cachexia, poor oral intake, issues with swallowing , severe overall malnutrition  -palliative consult for goals of care  -currently patient is FULL CODE.  -GOC TODAY ; if family and patient want all agressive care, then will need a PEG tube , as of current, patient is severely malnurished.                  Cheif complaints and Diagnosis: CAP/ sepsis/ failure to thrive/poor oral intake    Subjective: no complaints       REVIEW OF SYSTEMS:    CONSTITUTIONAL: No weakness, fevers or chills  EYES/ENT: No visual changes;  No vertigo or throat pain   NECK: No pain or stiffness  RESPIRATORY: No cough, wheezing, hemoptysis; No shortness of breath  CARDIOVASCULAR: No chest pain or palpitations  GASTROINTESTINAL: No abdominal or epigastric pain. No nausea, vomiting, or hematemesis; No diarrhea or constipation. No melena or hematochezia.  GENITOURINARY: No dysuria, frequency or hematuria  NEUROLOGICAL: No numbness or weakness  SKIN: No itching, burning, rashes, or lesions   All other review of systems is negative unless indicated above      Vital Signs Last 24 Hrs  T(C): 36.4 (24 May 2021 08:09), Max: 36.5 (23 May 2021 23:28)  T(F): 97.6 (24 May 2021 08:09), Max: 97.7 (23 May 2021 23:28)  HR: 70 (24 May 2021 08:09) (70 - 79)  BP: 117/55 (24 May 2021 08:09) (106/63 - 128/60)  BP(mean): --  RR: 17 (24 May 2021 08:09) (17 - 18)  SpO2: 98% (24 May 2021 08:09) (98% - 100%)    HEENT:   pupils equal and reactive, EOMI, no oropharyngeal lesions, erythema, exudates, oral thrush    NECK:   supple, no carotid bruits, no palpable lymph nodes, no thyromegaly    CV:  +S1, +S2, regular, no murmurs or rubs    RESP:   lungs clear to auscultation bilaterally, no wheezing, rales, rhonchi, good air entry bilaterally    BREAST:  not examined    GI:  abdomen soft, non-tender, non-distended, normal BS, no bruits, no abdominal masses, no palpable masses    RECTAL:  not examined    :  not examined    MSK:   normal muscle tone, no atrophy, no rigidity, no contractions    EXT:   no clubbing, no cyanosis, no edema, no calf pain, swelling or erythema    VASCULAR:  pulses equal and symmetric in the upper and lower extremities    NEURO:  AAOX3, no focal neurological deficits, follows all commands, able to move extremities spontaneously    SKIN:  no ulcers, lesions or rashes    MEDICATIONS  (STANDING):  azithromycin  IVPB 500 milliGRAM(s) IV Intermittent every 24 hours  cefTRIAXone Injectable. 1000 milliGRAM(s) IV Push every 24 hours  dextrose 40% Gel 15 Gram(s) Oral once  dextrose 50% Injectable 25 Gram(s) IV Push once  dextrose 50% Injectable 12.5 Gram(s) IV Push once  dextrose 50% Injectable 25 Gram(s) IV Push once  enoxaparin Injectable 40 milliGRAM(s) SubCutaneous daily  folic acid 1 milliGRAM(s) Oral daily  glucagon  Injectable 1 milliGRAM(s) IntraMuscular once  levothyroxine 175 MICROGram(s) Oral daily  multivitamin 1 Tablet(s) Oral daily  sodium chloride 0.9%. 1000 milliLiter(s) (70 mL/Hr) IV Continuous <Continuous>    MEDICATIONS  (PRN):  acetaminophen   Tablet .. 650 milliGRAM(s) Oral every 6 hours PRN Temp greater or equal to 38C (100.4F), Mild Pain (1 - 3)  LORazepam   Injectable 1 milliGRAM(s) IV Push every 2 hours PRN CIWA-Ar score increase by 2 points and a total score of 7 or less  LORazepam   Injectable 1 milliGRAM(s) IV Push every 1 hour PRN CIWA-Ar score 8 or greater  oxyCODONE    IR 5 milliGRAM(s) Oral three times a day PRN Severe Pain (7 - 10)  traMADol 25 milliGRAM(s) Oral every 6 hours PRN Moderate Pain (4 - 6)          CBC Full  -  ( 24 May 2021 08:41 )  WBC Count : 9.89 K/uL  Hemoglobin : 10.5 g/dL  Hematocrit : 32.1 %  Platelet Count - Automated : 331 K/uL  Mean Cell Volume : 61.5 fl  Mean Cell Hemoglobin : 20.1 pg  Mean Cell Hemoglobin Concentration : 32.7 gm/dL            Assessment and Plan:     88 yo M with a PMH hypothyroidism (due to thyroidectomy), 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    1- Community-acquired bacterial pneumonia/Sepsis/Lactic Acidosis  - Patient meets sepsis criteria with leukocytosis and tachycardia (although unclear if WBC may partly be due to leukemia)  - With RLL bacterial pneumonia  - Lactate 2.5, improved to 1.9  - Given Ceftriaxone/Azithromycin(completed), will continue IV  -blood / urine negative    2) Hairy Cell Leukemia  -no acute intervention at this time  -hb normal      3- Hypothyroidism  - C/w Levothyroxine 175 ucg QD  - TSH normal    4-dvt prophy - sc lovenox    5-failrue to thrive  -severe cachexia, poor oral intake, issues with swallowing , severe overall malnutrition  -palliative consult for goals of care  -currently patient is FULL CODE.  -GOC TODAY ; if family and patient want all agressive care, then will need a PEG tube , as of current, patient is severely malnurished.

## 2021-05-24 NOTE — PROGRESS NOTE ADULT - SUBJECTIVE AND OBJECTIVE BOX
HPI: pt seen and examined with no family at bedside,  patient denies any complaints at this time, [facility] meeting scheduled for 3pm today with patients daughter over the phone.     PAIN: ( )Yes   (x )No  DYSPNEA: ( ) Yes  (x ) No    Review of Systems:    Anxiety- no   Depression-no  Physical Discomfort- no   Dyspnea- no   Constipation- no   Diarrhea- no   Nausea- no   Vomiting- no   Anorexia- yes   Weight Loss- yes   Cough- yes   Secretions- no   Fatigue- yes   Weakness- yes   Delirium- no     All other systems reviewed and negative    PHYSICAL EXAM:    Vital Signs Last 24 Hrs  T(C): 36.4 (24 May 2021 08:09), Max: 36.5 (23 May 2021 23:28)  T(F): 97.6 (24 May 2021 08:09), Max: 97.7 (23 May 2021 23:28)  HR: 70 (24 May 2021 08:09) (70 - 79)  BP: 117/55 (24 May 2021 08:09) (106/63 - 128/60)  RR: 17 (24 May 2021 08:09) (17 - 18)  SpO2: 98% (24 May 2021 08:09) (98% - 100%)    PPSV2: 40  %  FAST: no documented history of dementia     General: pleasant cachectic male in bed, NAD   Mental Status: alert and oriented with forgetfulness   HEENT: dry oral mucosa, + temporal and clavicular wasting   Lungs: diminished breath sounds   Cardiac: S1S2 +   GI: nontender, nondistended, +BS   : +voiding   Ext: no edema   Neuro: forgetful, weakness     LABS:                        10.5   9.89  )-----------( 331      ( 24 May 2021 08:41 )             32.1     05-24    136  |  102  |  12  ----------------------------<  102<H>  3.8   |  29  |  0.50    Ca    7.8<L>      24 May 2021 08:41    Albumin: Albumin, Serum: 2.2 g/dL (05-19 @ 13:26)    Allergies No Known Allergies    Intolerances    MEDICATIONS  (STANDING):  cefTRIAXone Injectable. 1000 milliGRAM(s) IV Push every 24 hours  dextrose 40% Gel 15 Gram(s) Oral once  dextrose 50% Injectable 25 Gram(s) IV Push once  dextrose 50% Injectable 12.5 Gram(s) IV Push once  dextrose 50% Injectable 25 Gram(s) IV Push once  enoxaparin Injectable 40 milliGRAM(s) SubCutaneous daily  folic acid 1 milliGRAM(s) Oral daily  glucagon  Injectable 1 milliGRAM(s) IntraMuscular once  levothyroxine 175 MICROGram(s) Oral daily  multivitamin 1 Tablet(s) Oral daily  sodium chloride 0.9%. 1000 milliLiter(s) (70 mL/Hr) IV Continuous <Continuous>    MEDICATIONS  (PRN):  acetaminophen   Tablet .. 650 milliGRAM(s) Oral every 6 hours PRN Temp greater or equal to 38C (100.4F), Mild Pain (1 - 3)  LORazepam   Injectable 1 milliGRAM(s) IV Push every 2 hours PRN CIWA-Ar score increase by 2 points and a total score of 7 or less  LORazepam   Injectable 1 milliGRAM(s) IV Push every 1 hour PRN CIWA-Ar score 8 or greater  oxyCODONE    IR 5 milliGRAM(s) Oral three times a day PRN Severe Pain (7 - 10)  traMADol 25 milliGRAM(s) Oral every 6 hours PRN Moderate Pain (4 - 6)      RADIOLOGY:    EXAM:  US ABDOMEN RT UPR QUADRANT                        PROCEDURE DATE:  05/19/2021    INTERPRETATION:  CLINICAL INFORMATION: Alcohol abuse. Rule out cirrhosis or hepatitis.  COMPARISON: None available.  TECHNIQUE: Sonography of the right upper quadrant.    FINDINGS:  Liver: Within normal limits.  Bile ducts: Normal caliber. Common bile duct measures 8 mm which is within normal limits given patient's age.  Gallbladder: Status post cholecystectomy.  Pancreas: Pancreatic head/neck cystic lesions measuring 0.7 cm and 0.5 cm.  Right kidney: 9.1 cm. No hydronephrosis. Midpole cyst measuring 1.9 x 2.2 x 1.7 cm and mid to lower pole cyst measuring 0.7 x 0.6 x 0.7 cm.  Ascites: None.  Aorta/IVC: Visualized portions are within normal limits.  Miscellaneous: Right pleural effusion.    IMPRESSION:    1. No sonographic evidence of cirrhosis.  2. Small pancreatic head/neck cystic lesions.  3. Right pleural effusion.      EXAM:  CT BRAIN                        PROCEDURE DATE:  05/19/2021    INTERPRETATION:  CT head without IV contrast  CLINICAL INFORMATION: Altered mental status  TECHNIQUE: Contiguous axial 5 mm sections were obtained through the head.Sagittal and coronal 2-D reformatted images were also obtained.   This scan was performed using automatic exposure control (radiation dose reduction software) to obtain a diagnostic image quality scan with patient dose as low as reasonably achievable.    FINDINGS:   CT dated 12/16/2019 available for review. Patient motion degrades image quality.  The brain demonstrates moderate periventricular white matter ischemia.   No acute cerebral cortical infarct is seen.  No intracranial hemorrhage is found.  No mass effect is found in the brain.  The ventricles, sulci and basal cisterns appear unremarkable.  The orbits show LEFT globe and eyelid.  The paranasal sinuses are clear.  The nasal cavity appears intact.  The nasopharynx is symmetric.  The central skull base, petrous temporal bones and calvarium remain intact.    IMPRESSION:   Moderate periventricular white matter ischemia noted. Patient motion degrades image quality.      EXAM:  XR CHEST PORTABLE URGENT 1V                        PROCEDURE DATE:  05/19/2021    INTERPRETATION:  AP chest on May 19, 2021 at 3:28 PM. 2 images. Patient has weakness and weight loss.  Heart magnified by technique.  There is a sizable infiltrate in the right mid lower lung field new since August 10, 2020.  Clips in right upper quadrant noted.    IMPRESSION: Right lower lung field infiltrate.         HPI: pt seen and examined with no family at bedside,  patient denies any complaints at this time, [facility] meeting scheduled for 3pm today with patients daughter over the phone.     PAIN: ( )Yes   (x )No  DYSPNEA: ( ) Yes  (x ) No    Review of Systems:    Anxiety- no   Depression-no  Physical Discomfort- no   Dyspnea- no   Constipation- no   Diarrhea- no   Nausea- no   Vomiting- no   Anorexia- yes   Weight Loss- yes   Cough- yes   Secretions- no   Fatigue- yes   Weakness- yes   Delirium- no     All other systems reviewed and negative    PHYSICAL EXAM:    Vital Signs Last 24 Hrs  T(C): 36.4 (24 May 2021 08:09), Max: 36.5 (23 May 2021 23:28)  T(F): 97.6 (24 May 2021 08:09), Max: 97.7 (23 May 2021 23:28)  HR: 70 (24 May 2021 08:09) (70 - 79)  BP: 117/55 (24 May 2021 08:09) (106/63 - 128/60)  RR: 17 (24 May 2021 08:09) (17 - 18)  SpO2: 98% (24 May 2021 08:09) (98% - 100%)    PPSV2: 40  %  FAST: no documented history of dementia     General: pleasant cachectic male in bed, NAD   Mental Status: alert and oriented with forgetfulness   HEENT: dry oral mucosa, + temporal and clavicular wasting, poor dentition   Lungs: diminished breath sounds   Cardiac: S1S2 +   GI: nontender, nondistended, +BS   : +voiding   Ext: no edema   Neuro: forgetful, weakness     LABS:                        10.5   9.89  )-----------( 331      ( 24 May 2021 08:41 )             32.1     05-24    136  |  102  |  12  ----------------------------<  102<H>  3.8   |  29  |  0.50    Ca    7.8<L>      24 May 2021 08:41    Albumin: Albumin, Serum: 2.2 g/dL (05-19 @ 13:26)    Allergies No Known Allergies    Intolerances    MEDICATIONS  (STANDING):  cefTRIAXone Injectable. 1000 milliGRAM(s) IV Push every 24 hours  dextrose 40% Gel 15 Gram(s) Oral once  dextrose 50% Injectable 25 Gram(s) IV Push once  dextrose 50% Injectable 12.5 Gram(s) IV Push once  dextrose 50% Injectable 25 Gram(s) IV Push once  enoxaparin Injectable 40 milliGRAM(s) SubCutaneous daily  folic acid 1 milliGRAM(s) Oral daily  glucagon  Injectable 1 milliGRAM(s) IntraMuscular once  levothyroxine 175 MICROGram(s) Oral daily  multivitamin 1 Tablet(s) Oral daily  sodium chloride 0.9%. 1000 milliLiter(s) (70 mL/Hr) IV Continuous <Continuous>    MEDICATIONS  (PRN):  acetaminophen   Tablet .. 650 milliGRAM(s) Oral every 6 hours PRN Temp greater or equal to 38C (100.4F), Mild Pain (1 - 3)  LORazepam   Injectable 1 milliGRAM(s) IV Push every 2 hours PRN CIWA-Ar score increase by 2 points and a total score of 7 or less  LORazepam   Injectable 1 milliGRAM(s) IV Push every 1 hour PRN CIWA-Ar score 8 or greater  oxyCODONE    IR 5 milliGRAM(s) Oral three times a day PRN Severe Pain (7 - 10)  traMADol 25 milliGRAM(s) Oral every 6 hours PRN Moderate Pain (4 - 6)      RADIOLOGY:    EXAM:  US ABDOMEN RT UPR QUADRANT                        PROCEDURE DATE:  05/19/2021    INTERPRETATION:  CLINICAL INFORMATION: Alcohol abuse. Rule out cirrhosis or hepatitis.  COMPARISON: None available.  TECHNIQUE: Sonography of the right upper quadrant.    FINDINGS:  Liver: Within normal limits.  Bile ducts: Normal caliber. Common bile duct measures 8 mm which is within normal limits given patient's age.  Gallbladder: Status post cholecystectomy.  Pancreas: Pancreatic head/neck cystic lesions measuring 0.7 cm and 0.5 cm.  Right kidney: 9.1 cm. No hydronephrosis. Midpole cyst measuring 1.9 x 2.2 x 1.7 cm and mid to lower pole cyst measuring 0.7 x 0.6 x 0.7 cm.  Ascites: None.  Aorta/IVC: Visualized portions are within normal limits.  Miscellaneous: Right pleural effusion.    IMPRESSION:    1. No sonographic evidence of cirrhosis.  2. Small pancreatic head/neck cystic lesions.  3. Right pleural effusion.      EXAM:  CT BRAIN                        PROCEDURE DATE:  05/19/2021    INTERPRETATION:  CT head without IV contrast  CLINICAL INFORMATION: Altered mental status  TECHNIQUE: Contiguous axial 5 mm sections were obtained through the head.Sagittal and coronal 2-D reformatted images were also obtained.   This scan was performed using automatic exposure control (radiation dose reduction software) to obtain a diagnostic image quality scan with patient dose as low as reasonably achievable.    FINDINGS:   CT dated 12/16/2019 available for review. Patient motion degrades image quality.  The brain demonstrates moderate periventricular white matter ischemia.   No acute cerebral cortical infarct is seen.  No intracranial hemorrhage is found.  No mass effect is found in the brain.  The ventricles, sulci and basal cisterns appear unremarkable.  The orbits show LEFT globe and eyelid.  The paranasal sinuses are clear.  The nasal cavity appears intact.  The nasopharynx is symmetric.  The central skull base, petrous temporal bones and calvarium remain intact.    IMPRESSION:   Moderate periventricular white matter ischemia noted. Patient motion degrades image quality.      EXAM:  XR CHEST PORTABLE URGENT 1V                        PROCEDURE DATE:  05/19/2021    INTERPRETATION:  AP chest on May 19, 2021 at 3:28 PM. 2 images. Patient has weakness and weight loss.  Heart magnified by technique.  There is a sizable infiltrate in the right mid lower lung field new since August 10, 2020.  Clips in right upper quadrant noted.    IMPRESSION: Right lower lung field infiltrate.

## 2021-05-24 NOTE — PROGRESS NOTE ADULT - ASSESSMENT
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    2) Hairy Cell Leukemia  - Patient was diagnosed with hairy cell leukemia in 2012   - oncology consult appreciate   - to monitor H/H   - Status post cladribine therapy twice  - as per oncology patient currently not on chemotherapy does not Have any indication for retreatment    3) Severe protein calorie malnutrition   - patient severely cachetic   - Albumin, Serum: 2.2 g/dL  - patient states that he just has no appetite anymore   - speech and swallow consult     4) history of ETOH abuse   - as per daughter 2-3 drinks daily ( usually martinis)   - patient on CIWA protocol as per Primary team   - u/s abdomen -  No sonographic evidence of cirrhosis,  Small pancreatic head/neck cystic lesions, Right pleural effusion    5) Debility   - PPSV2: 40  %  - PT consult appreciated    - falls risk   - supportive care     5) Advance care planning   - limited capacity secondary to forgetfulness and [place], patient alert and oriented but has limited insight on his medical history   - patient lives at home with his wife ( who has advance dementia) and 24 HHA   - daughter lives in [place] and helps patient make medical decisions, although they have not been able to visit in over a year secondary to covid and states that her dad has been mostly house bound since last august she isnt sure if he has left the house.   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    2) Hairy Cell Leukemia  - Patient was diagnosed with hairy cell leukemia in 2012   - oncology consult appreciate   - to monitor H/H   - Status post cladribine therapy twice  - as per oncology patient currently not on chemotherapy does not Have any indication for retreatment    3) Severe protein calorie malnutrition   - patient severely cachetic   - Albumin, Serum: 2.2 g/dL  - patient states that he just has no appetite anymore   - speech and swallow consult     4) history of ETOH abuse   - as per daughter 2-3 drinks daily ( usually martinis)   - patient on CIWA protocol as per Primary team   - u/s abdomen -  No sonographic evidence of cirrhosis,  Small pancreatic head/neck cystic lesions, Right pleural effusion    5) Debility   - PPSV2: 40  %  - PT consult appreciated    - falls risk   - supportive care     5) Advance care planning   - limited capacity secondary to forgetfulness and [place], patient alert and oriented but has limited insight on his medical history   - patient lives at home with his wife ( who has advance dementia) and 24 HHA   - daughter lives in [place] and helps patient make medical decisions, although they have not been able to visit in over a year secondary to covid and states that her dad has been mostly house bound since last august she isnt sure if he has left the house.   - GOC Monday at 3pm with daughter over the telephone   - patient unsure of what he would like regarding resuscitation, states he filled out living will, Daughter will send copy tonight when she gets home from work.  Patient is adamant that he does not want a feeding tube though    - follow up meeting tomorrow 5/25

## 2021-05-25 NOTE — PROGRESS NOTE ADULT - SUBJECTIVE AND OBJECTIVE BOX
HPI: pt seen and examined with no family at  bedside this AM, pt had breakfast tray in front of him, at that time patient was stating that he was not hungry right now. Patient could recall parts of our conversation yesterday but stated for the most part that he is unable to remember at this time.  Will have follow up with patients daughter tomorrow 5/26/2021       PAIN: ( )Yes   (x )No  DYSPNEA: ( ) Yes  (x ) No    Review of Systems:    Anxiety- no   Depression-no  Physical Discomfort- no   Dyspnea- no   Constipation- no   Diarrhea- no   Nausea- no   Vomiting- no   Anorexia- yes   Weight Loss- yes   Cough- yes   Secretions- no   Fatigue- yes   Weakness- yes   Delirium- no     All other systems reviewed and negative    PHYSICAL EXAM:    Vital Signs Last 24 Hrs  T(C): 36.4 (25 May 2021 08:21), Max: 36.6 (24 May 2021 23:32)  T(F): 97.6 (25 May 2021 08:21), Max: 97.9 (24 May 2021 23:32)  HR: 84 (25 May 2021 08:21) (73 - 97)  BP: 120/50 (25 May 2021 08:21) (119/42 - 150/73)  RR: 18 (25 May 2021 08:21) (18 - 18)  SpO2: 95% (25 May 2021 08:21) (95% - 98%)    PPSV2: 40  %  FAST: no documented history of dementia     General: pleasant cachectic male in bed, NAD   Mental Status: alert and oriented with forgetfulness   HEENT: dry oral mucosa, + temporal and clavicular wasting, poor dentition   Lungs: diminished breath sounds   Cardiac: S1S2 +   GI: nontender, nondistended, +BS   : +voiding   Ext: no edema   Neuro: forgetful, weakness       LABS:                        10.5   9.89  )-----------( 331      ( 24 May 2021 08:41 )             32.1     05-24    136  |  102  |  12  ----------------------------<  102<H>  3.8   |  29  |  0.50    Ca    7.8<L>      24 May 2021 08:41    Albumin: Albumin, Serum: 2.2 g/dL (05-19 @ 13:26)    Allergies- No Known Allergies    MEDICATIONS  (STANDING):  dextrose 40% Gel 15 Gram(s) Oral once  dextrose 50% Injectable 25 Gram(s) IV Push once  dextrose 50% Injectable 12.5 Gram(s) IV Push once  dextrose 50% Injectable 25 Gram(s) IV Push once  enoxaparin Injectable 40 milliGRAM(s) SubCutaneous daily  folic acid 1 milliGRAM(s) Oral daily  glucagon  Injectable 1 milliGRAM(s) IntraMuscular once  levothyroxine 175 MICROGram(s) Oral daily  multivitamin 1 Tablet(s) Oral daily  sodium chloride 0.9%. 1000 milliLiter(s) (70 mL/Hr) IV Continuous <Continuous>    MEDICATIONS  (PRN):  acetaminophen   Tablet .. 650 milliGRAM(s) Oral every 6 hours PRN Temp greater or equal to 38C (100.4F), Mild Pain (1 - 3)  LORazepam   Injectable 1 milliGRAM(s) IV Push every 2 hours PRN CIWA-Ar score increase by 2 points and a total score of 7 or less  LORazepam   Injectable 1 milliGRAM(s) IV Push every 1 hour PRN CIWA-Ar score 8 or greater  oxyCODONE    IR 5 milliGRAM(s) Oral three times a day PRN Severe Pain (7 - 10)  traMADol 25 milliGRAM(s) Oral every 6 hours PRN Moderate Pain (4 - 6)      RADIOLOGY:

## 2021-05-25 NOTE — PROGRESS NOTE ADULT - ASSESSMENT
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    #Community-acquired bacterial pneumonia/Sepsis/Lactic Acidosis  - Patient meets sepsis criteria with leukocytosis and tachycardia (although unclear if WBC may partly be due to leukemia)  - With RLL bacterial pneumonia  - Lactate 2.5, improved to 1.9  - completed  Ceftriaxone/Azithromycin(completed), will continue IV  -blood / urine negative  05/25 on room air     # Hairy Cell Leukemia  -no acute intervention at this time  -hb normal    # Hypothyroidism  - C/w Levothyroxine 175 ucg QD  - TSH normal    #dvt prophy - sc lovenox    #-failrue to thrive  -severe cachexia, poor oral intake, issues with swallowing , severe overall malnutrition  -palliative consult for goals of care  -currently patient is FULL CODE.  -GOC TODAY ; if family and patient want all agressive care, then will need a PEG tube , as of current, patient is severely malnurished.

## 2021-05-25 NOTE — PROGRESS NOTE ADULT - SUBJECTIVE AND OBJECTIVE BOX
Cheif complaints and Diagnosis: CAP/ sepsis/ failure to thrive/poor oral intake    Subjective: no complaints   Patient seen and examined at bedside, no active complaint's decrease appetite . with poor oral intake. Patient states he is not a big eater   I spoke with Patient , explained he might need inpatient rehab , he is considering  I spoke with patient' daughter , who is in agreement with MARY, as long patient agrees vs going home with home care     Vital Signs Last 24 Hrs  T(C): 36.4 (25 May 2021 08:21), Max: 36.6 (24 May 2021 23:32)  T(F): 97.6 (25 May 2021 08:21), Max: 97.9 (24 May 2021 23:32)  HR: 84 (25 May 2021 08:21) (73 - 97)  BP: 120/50 (25 May 2021 08:21) (119/42 - 150/73)  BP(mean): --  RR: 18 (25 May 2021 08:21) (18 - 18)  SpO2: 95% (25 May 2021 08:21) (95% - 98%)  General; NAD   HEENT:   pupils equal and reactive, EOMI, no oropharyngeal lesions, erythema, exudates, oral thrush  NECK:   supple, no carotid bruits, no palpable lymph nodes, no thyromegaly  CV:  +S1, +S2, regular, no murmurs or rubs  RESP:   lungs clear to auscultation bilaterally, no wheezing, rales, rhonchi, good air entry bilaterally  BREAST:  not examined  GI:  abdomen soft, non-tender, non-distended, normal BS, no bruits, no abdominal masses, no palpable masses  RECTAL:  not examined  :  not examined  MSK:   normal muscle tone, no atrophy, no rigidity, no contractions  EXT:   no clubbing, no cyanosis, no edema, no calf pain, swelling or erythema  VASCULAR:  pulses equal and symmetric in the upper and lower extremities  NEURO:  AAOX3, no focal neurological deficits, follows all commands, able to move extremities spontaneously  SKIN:  no ulcers, lesions or rashes  05-24    136  |  102  |  12  ----------------------------<  102<H>  3.8   |  29  |  0.50    Ca    7.8<L>      24 May 2021 08:41                          10.5   9.89  )-----------( 331      ( 24 May 2021 08:41 )             32.1   MEDICATIONS  (STANDING):  dextrose 40% Gel 15 Gram(s) Oral once  dextrose 50% Injectable 25 Gram(s) IV Push once  dextrose 50% Injectable 12.5 Gram(s) IV Push once  dextrose 50% Injectable 25 Gram(s) IV Push once  enoxaparin Injectable 40 milliGRAM(s) SubCutaneous daily  folic acid 1 milliGRAM(s) Oral daily  glucagon  Injectable 1 milliGRAM(s) IntraMuscular once  levothyroxine 175 MICROGram(s) Oral daily  multivitamin 1 Tablet(s) Oral daily  sodium chloride 0.9%. 1000 milliLiter(s) (70 mL/Hr) IV Continuous <Continuous>    MEDICATIONS  (PRN):  acetaminophen   Tablet .. 650 milliGRAM(s) Oral every 6 hours PRN Temp greater or equal to 38C (100.4F), Mild Pain (1 - 3)  LORazepam   Injectable 1 milliGRAM(s) IV Push every 2 hours PRN CIWA-Ar score increase by 2 points and a total score of 7 or less  LORazepam   Injectable 1 milliGRAM(s) IV Push every 1 hour PRN CIWA-Ar score 8 or greater  oxyCODONE    IR 5 milliGRAM(s) Oral three times a day PRN Severe Pain (7 - 10)  traMADol 25 milliGRAM(s) Oral every 6 hours PRN Moderate Pain (4 - 6)

## 2021-05-25 NOTE — PROGRESS NOTE ADULT - ASSESSMENT
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    2) Hairy Cell Leukemia  - Patient was diagnosed with hairy cell leukemia in 2012   - oncology consult appreciate   - to monitor H/H   - Status post cladribine therapy twice  - as per oncology patient currently not on chemotherapy does not Have any indication for retreatment    3) Severe protein calorie malnutrition   - patient severely cachetic   - Albumin, Serum: 2.2 g/dL  - patient states that he just has no appetite anymore   - speech and swallow consult     4) history of ETOH abuse   - as per daughter 2-3 drinks daily ( usually martinis)   - patient on CIWA protocol as per Primary team   - u/s abdomen -  No sonographic evidence of cirrhosis,  Small pancreatic head/neck cystic lesions, Right pleural effusion    5) Debility   - PPSV2: 40  %  - PT consult appreciated    - falls risk   - supportive care     5) Advance care planning   - limited capacity secondary to forgetfulness and [place], patient alert and oriented but has limited insight on his medical history   - patient lives at home with his wife ( who has advance dementia) and 24 HHA   - daughter lives in [place] and helps patient make medical decisions, although they have not been able to visit in over a year secondary to covid and states that her dad has been mostly house bound since last august she isnt sure if he has left the house.   - GOC Monday at 3pm with daughter over the telephone   - patient unsure of what he would like regarding resuscitation, states he filled out living will, Daughter will send copy tonight when she gets home from work.  Patient is adamant that he does not want a feeding tube though    - follow up meeting 5/26

## 2021-05-26 NOTE — PROGRESS NOTE ADULT - SUBJECTIVE AND OBJECTIVE BOX
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 He has been feeling very weak for the past week. He has not eaten much and has lost weight. He also endorses a dry cough intermittently over the last week. He denies fevers, chills, SOB, chest pain, nausea, vomiting, abdominal pain, diarrhea, constipation, urinary complaints, rash, or swelling. He has hairy cell leukemia for which he received 3 treatments, the last one 2-3 years ago. He is not sure why he has not received more treatments.  He states normally he is active. He ambulates with a stroller but has had no recent falls.    He states has no known history of stroke but daughter states he HAS had one about 1.5 years ago with slurred speech and weakness.  Per daughter, he has been totally homebound.    In the ED, he was given Ceftriaxone 1 g IV x1, Azithromycin 500 mg IV x1, and NS 1L x2. (19 May 2021 18:11)    5/26:  Above reviewed; patient has no complaints; states that he is breathing better; as per the staff hes still not eating      Allergies:  No Known Allergies    REVIEW OF SYSTEMS:  See HPI. All other review of systems is negative unless indicated above.     OBJECTIVE  Physical Exam:  Vital Signs Last 24 Hrs  T(C): 36.3 (26 May 2021 08:40), Max: 36.6 (25 May 2021 15:33)  T(F): 97.4 (26 May 2021 08:40), Max: 97.9 (25 May 2021 15:33)  HR: 76 (26 May 2021 08:40) (72 - 76)  BP: 113/45 (26 May 2021 08:40) (113/45 - 121/50)  BP(mean): --  RR: 18 (26 May 2021 08:40) (17 - 18)  SpO2: 95% (26 May 2021 08:40) (95% - 96%)      Constitutional: NAD, awake and alert, well-developed  Neurological: AAO x 3, no focal deficits  HEENT: PERRLA, EOMI, MMM  Neck: Soft and supple, No LAD, No JVD  Respiratory: Breath sounds are clear bilaterally, No wheezing, rales or rhonchi  Cardiovascular: S1 and S2, regular rate and rhythm; no Murmurs, gallops or rubs  Gastrointestinal: Bowel Sounds present, soft, nontender, nondistended, no guarding, no rebound tenderness  Back: No CVA tenderness   Extremities: No peripheral edema  Vascular: 2+ peripheral pulses  Musculoskeletal: 5/5 strength b/l upper and lower extremities  Skin: No rashes  Breast: Deferred  Rectal: Deferred    MEDICATIONS  (STANDING):  dextrose 40% Gel 15 Gram(s) Oral once  dextrose 50% Injectable 25 Gram(s) IV Push once  dextrose 50% Injectable 12.5 Gram(s) IV Push once  dextrose 50% Injectable 25 Gram(s) IV Push once  enoxaparin Injectable 40 milliGRAM(s) SubCutaneous daily  folic acid 1 milliGRAM(s) Oral daily  glucagon  Injectable 1 milliGRAM(s) IntraMuscular once  levothyroxine 175 MICROGram(s) Oral daily  multivitamin 1 Tablet(s) Oral daily  sodium chloride 0.9%. 1000 milliLiter(s) (70 mL/Hr) IV Continuous <Continuous>      LABS: All Labs Reviewed:                        8.8    8.04  )-----------( 340      ( 26 May 2021 07:32 )             27.3     05-26    138  |  107  |  11  ----------------------------<  76  4.2   |  29  |  0.54    Ca    7.5<L>      26 May 2021 07:32      RADIOLOGY/EKG:    x< from: Xray Chest 1 View- PORTABLE-Urgent (Xray Chest 1 View- PORTABLE-Urgent .) (05.19.21 @ 15:38) >    IMPRESSION: Right lower lung field infiltrate.      < end of copied text >  < from: CT Head No Cont (05.19.21 @ 19:53) >    IMPRESSION:   Moderate periventricular white matter ischemia noted. Patient motion degrades image quality.      < end of copied text >

## 2021-05-26 NOTE — PROGRESS NOTE ADULT - ASSESSMENT
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    2) Hairy Cell Leukemia  - Patient was diagnosed with hairy cell leukemia in 2012   - oncology consult appreciate   - to monitor H/H   - Status post cladribine therapy twice  - as per oncology patient currently not on chemotherapy does not Have any indication for retreatment    3) Severe protein calorie malnutrition   - patient severely cachetic   - Albumin, Serum: 2.2 g/dL  - patient states that he just has no appetite anymore   - speech and swallow consult     4) history of ETOH abuse   - as per daughter 2-3 drinks daily ( usually martinis)   - patient on CIWA protocol as per Primary team   - u/s abdomen -  No sonographic evidence of cirrhosis,  Small pancreatic head/neck cystic lesions, Right pleural effusion    5) Debility   - PPSV2: 40  %  - PT consult appreciated    - falls risk   - supportive care     5) Advance care planning   - limited capacity secondary to forgetfulness and [place], patient alert and oriented but has limited insight on his medical history   - patient lives at home with his wife ( who has advance dementia) and 24 HHA   - daughter lives in [place] and helps patient make medical decisions, although they have not been able to visit in over a year secondary to covid and states that her dad has been mostly house bound since last august she isnt sure if he has left the house.   - GOC Monday at 3pm with daughter over the telephone   - patient unsure of what he would like regarding resuscitation, states he filled out living will, Daughter will send copy tonight when she gets home from work.  Patient is adamant that he does not want a feeding tube though    - follow up meeting 5/26 @ 1PM

## 2021-05-26 NOTE — PROGRESS NOTE ADULT - SUBJECTIVE AND OBJECTIVE BOX
HPI: pt seen and examined with no family at bedside. Pt is resting comfortable in bed, as per chart minimal PO intake, patient ate only a few bites, patient stating that he wants to go home, patient states ok to speak with his daughter    PAIN: ( )Yes   (x )No  DYSPNEA: ( ) Yes  (x ) No    Review of Systems:    Anxiety- no   Depression-no  Physical Discomfort- no   Dyspnea- no   Constipation- no   Diarrhea- no   Nausea- no   Vomiting- no   Anorexia- yes   Weight Loss- yes   Cough- yes   Secretions- no   Fatigue- yes   Weakness- yes   Delirium- no     All other systems reviewed and negative    PHYSICAL EXAM:    Vital Signs Last 24 Hrs  T(C): 36.3 (26 May 2021 08:40), Max: 36.6 (25 May 2021 15:33)  T(F): 97.4 (26 May 2021 08:40), Max: 97.9 (25 May 2021 15:33)  HR: 76 (26 May 2021 08:40) (72 - 76)  BP: 113/45 (26 May 2021 08:40) (113/45 - 121/50)  RR: 18 (26 May 2021 08:40) (17 - 18)  SpO2: 95% (26 May 2021 08:40) (95% - 96%)    PPSV2: 40  %  FAST: no documented history of dementia     General: pleasant cachectic male in bed, NAD   Mental Status: alert and oriented with forgetfulness   HEENT: dry oral mucosa, + temporal and clavicular wasting, poor dentition   Lungs: diminished breath sounds   Cardiac: S1S2 +   GI: nontender, nondistended, +BS   : +voiding   Ext: no edema   Neuro: forgetful, weakness       LABS:                        8.8    8.04  )-----------( 340      ( 26 May 2021 07:32 )             27.3     05-26    138  |  107  |  11  ----------------------------<  76  4.2   |  29  |  0.54    Ca    7.5<L>      26 May 2021 07:32        Albumin: Albumin, Serum: 2.2 g/dL (05-19 @ 13:26)      Allergies    No Known Allergies    Intolerances      MEDICATIONS  (STANDING):  dextrose 40% Gel 15 Gram(s) Oral once  dextrose 50% Injectable 25 Gram(s) IV Push once  dextrose 50% Injectable 12.5 Gram(s) IV Push once  dextrose 50% Injectable 25 Gram(s) IV Push once  enoxaparin Injectable 40 milliGRAM(s) SubCutaneous daily  folic acid 1 milliGRAM(s) Oral daily  glucagon  Injectable 1 milliGRAM(s) IntraMuscular once  levothyroxine 175 MICROGram(s) Oral daily  multivitamin 1 Tablet(s) Oral daily  sodium chloride 0.9%. 1000 milliLiter(s) (70 mL/Hr) IV Continuous <Continuous>    MEDICATIONS  (PRN):  acetaminophen   Tablet .. 650 milliGRAM(s) Oral every 6 hours PRN Temp greater or equal to 38C (100.4F), Mild Pain (1 - 3)  LORazepam   Injectable 1 milliGRAM(s) IV Push every 2 hours PRN CIWA-Ar score increase by 2 points and a total score of 7 or less  LORazepam   Injectable 1 milliGRAM(s) IV Push every 1 hour PRN CIWA-Ar score 8 or greater  ondansetron Injectable 4 milliGRAM(s) IV Push every 6 hours PRN Nausea and/or Vomiting  oxyCODONE    IR 5 milliGRAM(s) Oral three times a day PRN Severe Pain (7 - 10)  traMADol 25 milliGRAM(s) Oral every 6 hours PRN Moderate Pain (4 - 6)

## 2021-05-26 NOTE — PROGRESS NOTE ADULT - ASSESSMENT
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    #Community-acquired bacterial pneumonia/Sepsis/Lactic Acidosis  - Patient meets sepsis criteria with leukocytosis and tachycardia (although unclear if WBC may partly be due to leukemia)  - With RLL bacterial pneumonia  - Lactate 2.5, improved to 1.9  - completed  Ceftriaxone/Azithromycin(completed), switch to PO Ceftin  -blood / urine negative  -Pulse ox on RA stable     # Hairy Cell Leukemia  -no acute intervention at this time  -hb normal    # Hypothyroidism  - C/w Levothyroxine 175 ucg QD  - TSH normal    #Failure to thrive and Severe Protein Malnutrition  -severe cachexia, poor oral intake, issues with swallowing , severe overall malnutrition  -palliative consult appreciated     #history of ETOH abuse   - as per daughter 2-3 drinks daily ( usually martinis)   - did not score on CIWA protocol so discontinues   - u/s abdomen -  No sonographic evidence of cirrhosis,  Small pancreatic head/neck cystic lesions, Right pleural effusion    #dvt prophy - sc lovenox    Dispo - palliative to have conversation with daughter probable home with home hospice

## 2021-05-26 NOTE — GOALS OF CARE CONVERSATION - ADVANCED CARE PLANNING - CONVERSATION DETAILS
I spoke with the patient and his daughter Adriana and reviewed the role of palliative medicine. The patient's daughter states that she does not believe that he has a cognitive decline but has become [place].   Discussed medically that the patient is severely malnourished, the patient said he eats fine at home but doesn't have as much appetite anymore. He explained that concern is that he is increasingly weaker and that he appears to be declining.  The patient stated that he was able to take care of his % at a time.  The daughter, who is out of state, said that he lives with a 24-hour aide who is there for his patient's wife, who has dementia and is requesting that I reach out to Rosa, the aide who cares for her patients, to see how her father was doing. With Covid this past year she has not seen her parents. But facestimed with dad the other day and is concerned about much weight he has lost.  Discusses MOLST form, and at this time patient does not want to set any limits; his daughter will send a copy of his living will and speak more with his daughter before he makes any decisions.  The patient was clear that he does not want a feeding tube and will try to eat more.  Did review what is most important to the patient, which he stated was being home with his wife, explained that there are concerns that he will be back and forth to the hospital if he is unable to tolerate PO, reviewed the different levels of home care as patients daughter was inquiring about hospice. Studied the philosophy of hospice. The patient's daughter agrees that difficult to understand what the patient is fully understanding; patient daughter will send a copy of the living will and will follow up tomorrow.   Emotional Support provided
I met with the patient first, and he is stating that he will not go to Rehab and that he wants to go home to be with his wife but is concerned that the aides won't be able to care for them. He was stated to speak to his daughter, who helps with decisions, Concerned that the patient does not appear to have insight into complex medical decision-making.    I spoke with the patient daughter and recommended the patient to go home with hospice as the patient and family are stating that the most important thing is for the patient and his wife to be together.  Patient has significantly declined and states he is eating but I visited him during lunch and breakfast and neither meal had been eaten, Discussed the philosophy of hospice, which the patient daughter verbalized understanding.  She would like to speak with her father but is unable to do so until tomorrow AM.   I spoke with Dr. Muro, who will follow up. Emotional Support provided    Due to the patient’s health status and restrictions on visitation during the Public Health Emergency, the Advance Care Planning service was performed via telephone with patient’s daughter Bobbi

## 2021-05-27 NOTE — CHART NOTE - NSCHARTNOTEFT_GEN_A_CORE
Clinical Nutrition Follow Up Note:    *88yo male with PMH significant for CAP, sepsis, lactic acidosis, hairy cell leukemia, hypothyroidism, failure to thrive. palliative care is involved for GOC:     *Labs Reviewed:  05-26    138  |  107  |  11  ----------------------------<  76  4.2   |  29  |  0.54    Ca    7.5<L>      26 May 2021 07:32      HbA1c: A1C with Estimated Average Glucose Result: 5.2 % (05-20-21 @ 07:48)  Glucose: POCT Blood Glucose.: 224 mg/dL (05-21-21 @ 21:33)    *alexia WNL; monitor and correct prn.        *I and O's:    05-26-21 @ 07:01  -  05-27-21 @ 07:00  --------------------------------------------------------  IN:    Oral Fluid: 200 mL  Total IN: 200 mL    OUT:  Total OUT: 0 mL    Total NET: 200 mL      *positive fluid balance; no urine output documented.    *(+) BM on 5/26  ; pt not on bowel regimen.    *fabian score of 11 : stage 2 PU on coccyx documented.  no edema documented.    *Pt with extremely low PO intake and appetite; consuming 0-50% of tray; meeting <50% of estimated nutr needs since admission (8 days). Pt has made his wishes for no feeding tube clear.  d/w MD, trialing low dose of appetite stimulant.    *Malnutrition dx: severe malnutrition in acute on chronic illness r/t decreased PO intake 2/2 infection with CVA AEB severe muscle/fat wasting; meeting <50% of ENN x 1 wk    *SLP eval'd pt on 5/25, rec'd dysphagia 2 with honey thick liquids.    Diet, Dysphagia 2 Mechanical Soft-Honey Consistency Fluid:   Supplement Feeding Modality:  Oral  Ensure Pudding Cans or Servings Per Day:  1       Frequency:  Three Times a day (05-25-21 @ 12:08) [Active]        Estimated Needs: Based on 59 Kg (admit wt)   Calories: 3626-7172 Kcal (30-35 Kcal/Kg)  Protein:  g (1.6-1.8 g/Kg)  Fluids:  9362-7227 mL (30-35 mL/Kg)    *Wt Hx:  57.6Kg (5/27); wt loss of ~1.4Kg in ~8 days (2%), clinically significant.  Height (cm): 180.3 (05-19-21 @ 13:20)  Weight (kg): 59 (05-19-21 @ 13:20)  BMI (kg/m2): 18.1 (05-19-21 @ 13:20)  BSA (m2): 1.76 (05-19-21 @ 13:20)    Recommendations:  1) trial appetite stimulant  2) add gelatein TID (pt said he likes jello)  3) add vitamin D supplementation (pt with low vitamin D level on 5/20)  4) provide encouragement with all meals    *will continue to monitor and follow up with adjustments to treatment plan prn*    Annabel England MA, RDN, CDN, CNSC [phone]

## 2021-05-27 NOTE — PROGRESS NOTE ADULT - ASSESSMENT
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    #Community-acquired bacterial pneumonia/Sepsis/Lactic Acidosis  - Patient meets sepsis criteria with leukocytosis and tachycardia (although unclear if WBC may partly be due to leukemia)  - With RLL bacterial pneumonia  - Lactate 2.5, improved to 1.9  - completed  Ceftriaxone/Azithromycin(completed), switch to PO Ceftin  -blood / urine negative  -Pulse ox on RA stable     # Hairy Cell Leukemia  -no acute intervention at this time  -hb normal    # Hypothyroidism  - C/w Levothyroxine 175 ucg QD  - TSH normal    #Failure to thrive and Severe Protein Malnutrition  -severe cachexia, poor oral intake, issues with swallowing , severe overall malnutrition  -palliative consult appreciated   -Dietary consult appreciated  -start low dose remeron 7.5    #history of ETOH abuse   - as per daughter 2-3 drinks daily ( usually martinis)   - did not score on CIWA protocol so discontinues   - u/s abdomen -  No sonographic evidence of cirrhosis,  Small pancreatic head/neck cystic lesions, Right pleural effusion    #dvt prophy - sc lovenox    Dispo - palliative to have conversation with daughter probable home with home hospice

## 2021-05-27 NOTE — PROGRESS NOTE ADULT - SUBJECTIVE AND OBJECTIVE BOX
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 He has been feeling very weak for the past week. He has not eaten much and has lost weight. He also endorses a dry cough intermittently over the last week. He denies fevers, chills, SOB, chest pain, nausea, vomiting, abdominal pain, diarrhea, constipation, urinary complaints, rash, or swelling. He has hairy cell leukemia for which he received 3 treatments, the last one 2-3 years ago. He is not sure why he has not received more treatments.  He states normally he is active. He ambulates with a stroller but has had no recent falls.    He states has no known history of stroke but daughter states he HAS had one about 1.5 years ago with slurred speech and weakness.  Per daughter, he has been totally homebound.    In the ED, he was given Ceftriaxone 1 g IV x1, Azithromycin 500 mg IV x1, and NS 1L x2. (19 May 2021 18:11)    5/26:  Above reviewed; patient has no complaints; states that he is breathing better; as per the staff hes still not eating  5/27: patient still not eating much but had some dinner but no breakfast this morning;  explained code status to patient and states that he doesnt want to be intubated and have cpr but then later states that hes not sure what he wants       Allergies:  No Known Allergies    REVIEW OF SYSTEMS:  See HPI. All other review of systems is negative unless indicated above.     OBJECTIVE  Physical Exam:  Vital Signs Last 24 Hrs  T(C): 36.1 (27 May 2021 08:28), Max: 37.1 (26 May 2021 23:24)  T(F): 96.9 (27 May 2021 08:28), Max: 98.7 (26 May 2021 23:24)  HR: 79 (27 May 2021 08:28) (79 - 80)  BP: 126/47 (27 May 2021 08:28) (108/45 - 126/47)  BP(mean): --  RR: 17 (27 May 2021 08:28) (17 - 18)  SpO2: 97% (27 May 2021 08:28) (97% - 98%)      Constitutional: NAD, awake and alert, severe malnourished   Neurological: AAO x 3, no focal deficits  HEENT: PERRLA, EOMI, MMM  Neck: Soft and supple, No LAD, No JVD  Respiratory: Breath sounds are clear bilaterally, No wheezing, rales or rhonchi  Cardiovascular: S1 and S2, regular rate and rhythm; no Murmurs, gallops or rubs  Gastrointestinal: Bowel Sounds present, soft, nontender, nondistended, no guarding, no rebound tenderness  Back: No CVA tenderness   Extremities: No peripheral edema  Vascular: 2+ peripheral pulses  Musculoskeletal: 5/5 strength b/l upper and lower extremities  Skin: No rashes  Breast: Deferred  Rectal: Deferred    MEDICATIONS  (STANDING):  dextrose 40% Gel 15 Gram(s) Oral once  dextrose 50% Injectable 25 Gram(s) IV Push once  dextrose 50% Injectable 12.5 Gram(s) IV Push once  dextrose 50% Injectable 25 Gram(s) IV Push once  enoxaparin Injectable 40 milliGRAM(s) SubCutaneous daily  folic acid 1 milliGRAM(s) Oral daily  glucagon  Injectable 1 milliGRAM(s) IntraMuscular once  levothyroxine 175 MICROGram(s) Oral daily  multivitamin 1 Tablet(s) Oral daily  sodium chloride 0.9%. 1000 milliLiter(s) (70 mL/Hr) IV Continuous <Continuous>    Lab Results:  CBC  CBC Full  -  ( 27 May 2021 07:44 )  WBC Count : 8.22 K/uL  RBC Count : 4.37 M/uL  Hemoglobin : 8.9 g/dL  Hematocrit : 27.0 %  Platelet Count - Automated : 342 K/uL  Mean Cell Volume : 61.8 fl  Mean Cell Hemoglobin : 20.4 pg  Mean Cell Hemoglobin Concentration : 33.0 gm/dL  Auto Neutrophil # : x  Auto Lymphocyte # : x  Auto Monocyte # : x  Auto Eosinophil # : x  Auto Basophil # : x  Auto Neutrophil % : x  Auto Lymphocyte % : x  Auto Monocyte % : x  Auto Eosinophil % : x  Auto Basophil % : x    .		Differential:	[] Automated		[] Manual  Chemistry                        8.9    8.22  )-----------( 342      ( 27 May 2021 07:44 )             27.0     05-26    138  |  107  |  11  ----------------------------<  76  4.2   |  29  |  0.54    Ca    7.5<L>      26 May 2021 07:32        RADIOLOGY/EKG:    x< from: Xray Chest 1 View- PORTABLE-Urgent (Xray Chest 1 View- PORTABLE-Urgent .) (05.19.21 @ 15:38) >    IMPRESSION: Right lower lung field infiltrate.      < end of copied text >  < from: CT Head No Cont (05.19.21 @ 19:53) >    IMPRESSION:   Moderate periventricular white matter ischemia noted. Patient motion degrades image quality.      < end of copied text >

## 2021-05-28 NOTE — PROGRESS NOTE ADULT - ASSESSMENT
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    2) Hairy Cell Leukemia  - Patient was diagnosed with hairy cell leukemia in 2012   - oncology consult appreciate   - to monitor H/H   - Status post cladribine therapy twice  - as per oncology patient currently not on chemotherapy does not Have any indication for retreatment    3) Severe protein calorie malnutrition   - patient severely cachetic   - Albumin, Serum: 2.2 g/dL  - patient states that he just has no appetite anymore   - speech and swallow consult   - Remeron 7.5 started     4) history of ETOH abuse   - as per daughter 2-3 drinks daily ( usually martinis)   - patient on CIWA protocol as per Primary team   - u/s abdomen -  No sonographic evidence of cirrhosis,  Small pancreatic head/neck cystic lesions, Right pleural effusion    5) Debility   - PPSV2: 40  %  - PT consult appreciated    - falls risk   - supportive care     5) Advance care planning   - limited capacity secondary to forgetfulness and [place], patient alert and oriented but has limited insight on his medical history   - patient lives at home with his wife ( who has advance dementia) and 24 HHA   - daughter lives in [place] and helps patient make medical decisions, although they have not been able to visit in over a year secondary to covid and states that her dad has been mostly house bound since last august she isnt sure if he has left the house.   - GOC Monday at 3pm with daughter over the telephone   - patient unsure of what he would like regarding resuscitation, states he filled out living will, Daughter will send copy tonight when she gets home from work.  Patient is adamant that he does not want a feeding tube though    - follow up meeting 5/26 @ 1PM   - as per Jamir REYNAGA plan for patient to return home with 24 hour aides in place and palliative home care

## 2021-05-28 NOTE — PROGRESS NOTE ADULT - NUTRITIONAL ASSESSMENT
This patient has been assessed with a concern for Malnutrition and has been determined to have a diagnosis/diagnoses of Severe protein-calorie malnutrition and Underweight (BMI < 19).    This patient is being managed with:   Diet Dysphagia 2 Mechanical Soft-Honey Consistency Fluid-  Entered: May 19 2021  7:27PM    
This patient has been assessed with a concern for Malnutrition and has been determined to have a diagnosis/diagnoses of Severe protein-calorie malnutrition and Underweight (BMI < 19).    This patient is being managed with:   Diet Dysphagia 2 Mechanical Soft-Honey Consistency Fluid-  Supplement Feeding Modality:  Oral  Ensure Pudding Cans or Servings Per Day:  1       Frequency:  Three Times a day  Entered: May 25 2021 12:07PM    

## 2021-05-28 NOTE — DISCHARGE NOTE PROVIDER - NSDCCPCAREPLAN_GEN_ALL_CORE_FT
PRINCIPAL DISCHARGE DIAGNOSIS  Diagnosis: CAP (community acquired pneumonia)  Assessment and Plan of Treatment: #Community-acquired bacterial pneumonia/Sepsis/Lactic Acidosis  - Patient meets sepsis criteria with leukocytosis and tachycardia   - RLL bacterial pneumonia  - Lactate 2.5, improved to 1.9  - completed  Ceftriaxone/Azithromycin  -blood / urine negative  -Pulse ox on RA stable   completed antibiotis no further treatment         SECONDARY DISCHARGE DIAGNOSES  Diagnosis: Failure to thrive in adult  Assessment and Plan of Treatment: started on remeron for appetite stimulant

## 2021-05-28 NOTE — DISCHARGE NOTE NURSING/CASE MANAGEMENT/SOCIAL WORK - PATIENT PORTAL LINK FT
You can access the FollowMyHealth Patient Portal offered by [facility] by registering at the following website: [email or web address]. By joining [facility]’s FollowMyHealth portal, you will also be able to view your health information using other applications (apps) compatible with our system.

## 2021-05-28 NOTE — PROVIDER CONTACT NOTE (OTHER) - SITUATION
DISCHARGE FOLLOW-UP APPOINTMENT ON WEDNESDAY, JUNE 2ND WITH DR. DELVALLE @11:30 AM. SPOKE WITH  MYLA FROM MD'S OFFICE.
answering service aware of consult.
WRONG MD WAS CALLED IN THE ER (WYATT)

## 2021-05-28 NOTE — PROGRESS NOTE ADULT - NSICDXPILOT_GEN_ALL_CORE
[place]

## 2021-05-28 NOTE — DISCHARGE NOTE PROVIDER - NSDCMRMEDTOKEN_GEN_ALL_CORE_FT
folic acid 1 mg oral tablet: 1 tab(s) orally once a day  levothyroxine 175 mcg (0.175 mg) oral tablet: 1 tab(s) orally once a day  mirtazapine 7.5 mg oral tablet: 1 tab(s) orally once a day (at bedtime)  Multiple Vitamins oral tablet: 1 tab(s) orally once a day  traMADol 50 mg oral tablet: 0.5 tab(s) orally every 6 hours, As needed, Moderate Pain (4 - 6) MDD:3 tabs

## 2021-05-28 NOTE — DISCHARGE NOTE NURSING/CASE MANAGEMENT/SOCIAL WORK - NSDCFUADDAPPT_GEN_ALL_CORE_FT
DISCHARGE FOLLOW-UP APPOINTMENT ON WEDNESDAY, JUNE 2ND WITH DR. DELVALLE @11:30 AM. SPOKE WITH  MYLA FROM MD'S OFFICE.

## 2021-05-28 NOTE — PROGRESS NOTE ADULT - PROVIDER SPECIALTY LIST ADULT
Hospitalist
Palliative Care
Palliative Care
Hospitalist
Hospitalist
Palliative Care
Palliative Care
Hospitalist

## 2021-05-28 NOTE — PROGRESS NOTE ADULT - SUBJECTIVE AND OBJECTIVE BOX
HPI: pt seen and examined with no family at bedside, patient ate a few bites of breakfast this AM, but then stated not able to eat more since not hungry. Patient requesting to go home, as per JUHI Bowie daughter has decided on home with 24 hour aides and palliative home care     PAIN: ( )Yes   (x )No  DYSPNEA: ( ) Yes  (x ) No    Review of Systems:    Anxiety- no   Depression-no  Physical Discomfort- no   Dyspnea- no   Constipation- no   Diarrhea- no   Nausea- no   Vomiting- no   Anorexia- yes   Weight Loss- yes   Cough- yes   Secretions- no   Fatigue- yes   Weakness- yes   Delirium- no     All other systems reviewed and negative    PHYSICAL EXAM:    Vital Signs Last 24 Hrs  T(C): 36.5 (28 May 2021 08:35), Max: 36.8 (27 May 2021 23:22)  T(F): 97.7 (28 May 2021 08:35), Max: 98.2 (27 May 2021 23:22)  HR: 81 (28 May 2021 08:35) (78 - 81)  BP: 127/51 (28 May 2021 08:35) (124/43 - 127/51  RR: 17 (28 May 2021 08:35) (17 - 17)  SpO2: 97% (28 May 2021 08:35) (96% - 99%)     PPSV2: 40  %  FAST: no documented history of dementia     General: pleasant cachectic male in bed, NAD   Mental Status: alert and oriented with forgetfulness   HEENT: dry oral mucosa, + temporal and clavicular wasting, poor dentition   Lungs: diminished breath sounds   Cardiac: S1S2 +   GI: nontender, nondistended, +BS   : +voiding   Ext: no edema   Neuro: forgetful, weakness       LABS:                        8.9    8.22  )-----------( 342      ( 27 May 2021 07:44 )             27.0       Albumin:     Allergies    No Known Allergies    Intolerances      MEDICATIONS  (STANDING):  enoxaparin Injectable 40 milliGRAM(s) SubCutaneous daily  folic acid 1 milliGRAM(s) Oral daily  levothyroxine 175 MICROGram(s) Oral daily  mirtazapine 7.5 milliGRAM(s) Oral at bedtime  multivitamin 1 Tablet(s) Oral daily    MEDICATIONS  (PRN):  acetaminophen   Tablet .. 650 milliGRAM(s) Oral every 6 hours PRN Temp greater or equal to 38C (100.4F), Mild Pain (1 - 3)  ondansetron Injectable 4 milliGRAM(s) IV Push every 6 hours PRN Nausea and/or Vomiting  oxyCODONE    IR 5 milliGRAM(s) Oral three times a day PRN Severe Pain (7 - 10)  traMADol 25 milliGRAM(s) Oral every 6 hours PRN Moderate Pain (4 - 6)

## 2021-05-28 NOTE — PROGRESS NOTE ADULT - SUBJECTIVE AND OBJECTIVE BOX
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 He has been feeling very weak for the past week. He has not eaten much and has lost weight. He also endorses a dry cough intermittently over the last week. He denies fevers, chills, SOB, chest pain, nausea, vomiting, abdominal pain, diarrhea, constipation, urinary complaints, rash, or swelling. He has hairy cell leukemia for which he received 3 treatments, the last one 2-3 years ago. He is not sure why he has not received more treatments.  He states normally he is active. He ambulates with a stroller but has had no recent falls.    He states has no known history of stroke but daughter states he HAS had one about 1.5 years ago with slurred speech and weakness.  Per daughter, he has been totally homebound.    In the ED, he was given Ceftriaxone 1 g IV x1, Azithromycin 500 mg IV x1, and NS 1L x2. (19 May 2021 18:11)    5/26:  Above reviewed; patient has no complaints; states that he is breathing better; as per the staff hes still not eating  5/27: patient still not eating much but had some dinner but no breakfast this morning;  explained code status to patient and states that he doesnt want to be intubated and have cpr but then later states that hes not sure what he wants   5/28:  Paient has no complaints; no overnight events; Going home today      Allergies:  No Known Allergies    REVIEW OF SYSTEMS:  See HPI. All other review of systems is negative unless indicated above.     OBJECTIVE  Physical Exam:  Vital Signs Last 24 Hrs  T(C): 36.5 (28 May 2021 08:35), Max: 36.8 (27 May 2021 23:22)  T(F): 97.7 (28 May 2021 08:35), Max: 98.2 (27 May 2021 23:22)  HR: 81 (28 May 2021 08:35) (78 - 81)  BP: 127/51 (28 May 2021 08:35) (124/43 - 127/51)  BP(mean): --  RR: 17 (28 May 2021 08:35) (17 - 17)  SpO2: 97% (28 May 2021 08:35) (96% - 99%)    Constitutional: NAD, awake and alert, severe malnourished   Neurological: AAO x 3, no focal deficits  HEENT: PERRLA, EOMI, MMM  Neck: Soft and supple, No LAD, No JVD  Respiratory: Breath sounds are clear bilaterally, No wheezing, rales or rhonchi  Cardiovascular: S1 and S2, regular rate and rhythm; no Murmurs, gallops or rubs  Gastrointestinal: Bowel Sounds present, soft, nontender, nondistended, no guarding, no rebound tenderness  Back: No CVA tenderness   Extremities: No peripheral edema  Vascular: 2+ peripheral pulses  Musculoskeletal: 5/5 strength b/l upper and lower extremities  Skin: No rashes  Breast: Deferred  Rectal: Deferred    MEDICATIONS  (STANDING):  dextrose 40% Gel 15 Gram(s) Oral once  dextrose 50% Injectable 25 Gram(s) IV Push once  dextrose 50% Injectable 12.5 Gram(s) IV Push once  dextrose 50% Injectable 25 Gram(s) IV Push once  enoxaparin Injectable 40 milliGRAM(s) SubCutaneous daily  folic acid 1 milliGRAM(s) Oral daily  glucagon  Injectable 1 milliGRAM(s) IntraMuscular once  levothyroxine 175 MICROGram(s) Oral daily  multivitamin 1 Tablet(s) Oral daily  sodium chloride 0.9%. 1000 milliLiter(s) (70 mL/Hr) IV Continuous <Continuous>    Lab Results:  CBC  CBC Full  -  ( 27 May 2021 07:44 )  WBC Count : 8.22 K/uL  RBC Count : 4.37 M/uL  Hemoglobin : 8.9 g/dL  Hematocrit : 27.0 %  Platelet Count - Automated : 342 K/uL  Mean Cell Volume : 61.8 fl  Mean Cell Hemoglobin : 20.4 pg  Mean Cell Hemoglobin Concentration : 33.0 gm/dL  Auto Neutrophil # : x  Auto Lymphocyte # : x  Auto Monocyte # : x  Auto Eosinophil # : x  Auto Basophil # : x  Auto Neutrophil % : x  Auto Lymphocyte % : x  Auto Monocyte % : x  Auto Eosinophil % : x  Auto Basophil % : x    .		Differential:	[] Automated		[] Manual  Chemistry                        8.9    8.22  )-----------( 342      ( 27 May 2021 07:44 )             27.0     05-26    138  |  107  |  11  ----------------------------<  76  4.2   |  29  |  0.54    Ca    7.5<L>      26 May 2021 07:32        RADIOLOGY/EKG:    x< from: Xray Chest 1 View- PORTABLE-Urgent (Xray Chest 1 View- PORTABLE-Urgent .) (05.19.21 @ 15:38) >    IMPRESSION: Right lower lung field infiltrate.      < end of copied text >  < from: CT Head No Cont (05.19.21 @ 19:53) >    IMPRESSION:   Moderate periventricular white matter ischemia noted. Patient motion degrades image quality.      < end of copied text >

## 2021-05-28 NOTE — DISCHARGE NOTE PROVIDER - HOSPITAL COURSE
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 He has been feeling very weak for the past week. He has not eaten much and has lost weight. He also endorses a dry cough intermittently over the last week. He denies fevers, chills, SOB, chest pain, nausea, vomiting, abdominal pain, diarrhea, constipation, urinary complaints, rash, or swelling. He has hairy cell leukemia for which he received 3 treatments, the last one 2-3 years ago. He is not sure why he has not received more treatments.  He states normally he is active. He ambulates with a stroller but has had no recent falls.    He states has no known history of stroke but daughter states he HAS had one about 1.5 years ago with slurred speech and weakness.  Per daughter, he has been totally homebound.    In the ED, he was given Ceftriaxone 1 g IV x1, Azithromycin 500 mg IV x1, and NS 1L x2. 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    #Community-acquired bacterial pneumonia/Sepsis/Lactic Acidosis  - Patient meets sepsis criteria with leukocytosis and tachycardia (although unclear if WBC may partly be due to leukemia)  - With RLL bacterial pneumonia  - Lactate 2.5, improved to 1.9  - completed  Ceftriaxone/Azithromycin(completed), switch to PO Ceftin  -blood / urine negative  -Pulse ox on RA stable     # Hairy Cell Leukemia  -no acute intervention at this time  -hb normal    # Hypothyroidism  - C/w Levothyroxine 175 ucg QD  - TSH normal    #Failure to thrive and Severe Protein Malnutrition  -severe cachexia, poor oral intake, issues with swallowing , severe overall malnutrition  -palliative consult appreciated   -Dietary consult appreciated  -start low dose remeron 7.5    #history of ETOH abuse   - as per daughter 2-3 drinks daily ( usually martinis)   - did not score on CIWA protocol so discontinues   - u/s abdomen -  No sonographic evidence of cirrhosis,  Small pancreatic head/neck cystic lesions, Right pleural effusion    #dvt prophy - sc lovenox    Dispo -  home with palliative     total time 80 minutes

## 2021-05-28 NOTE — PROVIDER CONTACT NOTE (OTHER) - NAME OF MD/NP/PA/DO NOTIFIED:
DR. AGUIAR RETIRED IN DECEMBER. [facility] WILL TAKE OVER PATIENTS CARE.
PONCHO SPOKE WITH PEÑA FROM ANSWERING SERVICE.
oncology

## 2021-05-28 NOTE — PROGRESS NOTE ADULT - ASSESSMENT
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    #Community-acquired bacterial pneumonia/Sepsis/Lactic Acidosis  - Patient meets sepsis criteria with leukocytosis and tachycardia (although unclear if WBC may partly be due to leukemia)  - With RLL bacterial pneumonia  - Lactate 2.5, improved to 1.9  - completed  Ceftriaxone/Azithromycin(completed), switch to PO Ceftin  -blood / urine negative  -Pulse ox on RA stable     # Hairy Cell Leukemia  -no acute intervention at this time  -hb normal    # Hypothyroidism  - C/w Levothyroxine 175 ucg QD  - TSH normal    #Failure to thrive and Severe Protein Malnutrition  -severe cachexia, poor oral intake, issues with swallowing , severe overall malnutrition  -palliative consult appreciated   -Dietary consult appreciated  -start low dose remeron 7.5    #history of ETOH abuse   - as per daughter 2-3 drinks daily ( usually martinis)   - did not score on CIWA protocol so discontinues   - u/s abdomen -  No sonographic evidence of cirrhosis,  Small pancreatic head/neck cystic lesions, Right pleural effusion    #dvt prophy - sc lovenox    Dispo -  home with palliative

## 2021-05-28 NOTE — DISCHARGE NOTE PROVIDER - CARE PROVIDER_API CALL
Sebastian Alicea)  Critical Care Medicine; Internal Medicine; Pulmonary Disease; Sleep Medicine  [address] [place] [address]  Phone: [phone]  Fax: [phone]  Follow Up Time:

## 2021-05-29 PROBLEM — I63.9 CEREBRAL INFARCTION, UNSPECIFIED: Chronic | Status: ACTIVE | Noted: 2021-01-01

## 2021-05-29 PROBLEM — F10.10 ALCOHOL ABUSE, UNCOMPLICATED: Chronic | Status: ACTIVE | Noted: 2021-01-01

## 2021-05-29 PROBLEM — K21.9 GASTRO-ESOPHAGEAL REFLUX DISEASE WITHOUT ESOPHAGITIS: Chronic | Status: ACTIVE | Noted: 2021-01-01

## 2021-05-29 PROBLEM — N40.0 BENIGN PROSTATIC HYPERPLASIA WITHOUT LOWER URINARY TRACT SYMPTOMS: Chronic | Status: ACTIVE | Noted: 2021-01-01

## 2021-05-29 PROBLEM — E03.9 HYPOTHYROIDISM, UNSPECIFIED: Chronic | Status: ACTIVE | Noted: 2017-10-13

## 2021-05-29 PROBLEM — C91.40 HAIRY CELL LEUKEMIA NOT HAVING ACHIEVED REMISSION: Chronic | Status: ACTIVE | Noted: 2021-01-01

## 2021-05-29 NOTE — H&P ADULT - HISTORY OF PRESENT ILLNESS
87 year old male patient who was recently admitted and discharged one day prior (05/28/2021) presented to the ED endorsing a fever since being discharged. Patient states his temperature was 100.4, Denies any increased coughing, chills, fatigue or weakness. Patient ambulates at baseline with a walker. No noted chest pain, shortness of breath, palpitation or diarrhea.      In the ED patient with WBC of 11.45( was 8.22 on discharge)- RLL infiltrate seen on CXR.

## 2021-05-29 NOTE — ED ADULT NURSE NOTE - OBJECTIVE STATEMENT
Pt BIBA from home with wife with fever 101 at home.  Pt was DC from  x 3 hours ago for PNA.  Pt denies chest pain, SOB, or cough.  VSS

## 2021-05-29 NOTE — H&P ADULT - ASSESSMENT
87 year old male patient who was recently admitted and discharged for acute respiratory failure/ pneumonia          - Admit to [facility]        # Acute Respiratory failure/ Community Acquired Pneumonia  -pt recently admitted and treated for pna  -s/p rocephin/ zithromax  -will switch to zosyn  -currently not hypoxic  -monitor temps    # Leukocytosis  -likely reactive to above  -on zosyn  -trend cbc    # Fever  -currently afebrile  -possible reactive to above    # Anemia  -stable and at baseline    # Hypothyroidism  - on synthroid    # Hx of ETOH abuse  -on CIWA protocol    #Hairy Cell Leukemia  -pt completed treatment  -supportive care    #Protein calorie malnutrition  -get Nutritionist consult    # Advanced Directives  -pt still full code for now  -to be discharged home on home hospice.    # DVT ppx  -scds

## 2021-05-29 NOTE — ED CLERICAL - NS ED CLERK NOTE PRE-ARRIVAL INFORMATION; ADDITIONAL PRE-ARRIVAL INFORMATION
This patient is enrolled in the readmission reduction program and has active care navigation. This patient can be followed up by the care navigation team within 24 hours. To arrange close follow-up or to obtain additional clinical information about this patient, please call the contact number above. Please call the hospitalist as needed to collaborate on further medical management ([phone])

## 2021-05-29 NOTE — H&P ADULT - NSICDXPASTMEDICALHX_GEN_ALL_CORE_FT
PAST MEDICAL HISTORY:  Alcohol abuse per daughter    BPH (benign prostatic hyperplasia)     Cerebrovascular accident (CVA) per daughter, in 2019    GERD (gastroesophageal reflux disease)     Hairy cell leukemia     Hypothyroidism s/p thyroidectomy

## 2021-05-29 NOTE — ED PROVIDER NOTE - PROGRESS NOTE DETAILS
Scribe Jaclyn Casale for attending Dr Valdovinos: spoke to daughter who said the palliative care nurse noted a fever of 100.4, called the doctor and the doctor directed the patient to return to the ER.   Palliative care nurse number: [phone]

## 2021-05-29 NOTE — ED PROVIDER NOTE - PMH
Alcohol abuse  per daughter  BPH (benign prostatic hyperplasia)    Cerebrovascular accident (CVA)  per daughter, in 2019  GERD (gastroesophageal reflux disease)    Hairy cell leukemia    Hypothyroidism  s/p thyroidectomy

## 2021-05-29 NOTE — PATIENT PROFILE ADULT - NSASFALLNEEDSASSISTWITH_GEN_A_NUR
Your current Orthopaedic problem we are working together to treat is:  Right radial head fracture- non displaced.    - wear sling as needed over the next 3 weeks (discontinue when feeling confident)  - work on ROM (range of motion) for flexion and extension  - tylenol 1000 mg as needed (max of 3000 mg/day)  - return to clinic for xray after 2/17/20, If unable to return to clinic call for discussion about advancing activities.       It is recommended you schedule a follow-up appointment with Celio Patel MD after 2/17.      Office hours are 8:00 am to 5:00 pm Monday through Friday. If it is urgent that you speak with someone outside of these hours, our [facility] Call Center will be able to assist you. You can reach the office by calling the [facility] at [phone], [place] at [phone], or [address] [place] [address] at [phone].     We do highly recommend [employer], if you do not already have this. You can request access via the internet or by simply talking with a  at any of the clinics.   [email or web address].      Thank you for choosing [facility] as your Orthopedic provider!      
standing/walking/toileting

## 2021-05-29 NOTE — ED ADULT NURSE NOTE - NSIMPLEMENTINTERV_GEN_ALL_ED
Implemented All Fall with Harm Risk Interventions:  [place] to call system. Call bell, personal items and telephone within reach. Instruct patient to call for assistance. Room bathroom lighting operational. Non-slip footwear when patient is off stretcher. Physically safe environment: no spills, clutter or unnecessary equipment. Stretcher in lowest position, wheels locked, appropriate side rails in place. Provide visual cue, wrist band, yellow gown, etc. Monitor gait and stability. Monitor for mental status changes and reorient to person, place, and time. Review medications for side effects contributing to fall risk. Reinforce activity limits and safety measures with patient and family. Provide visual clues: red socks.

## 2021-05-29 NOTE — H&P ADULT - NSHPOUTPATIENTPROVIDERS_GEN_ALL_CORE
Daughter, Adriana Namtai, ([phone])  PCP, Armen Pappas  Oncology, [facility]

## 2021-05-29 NOTE — H&P ADULT - NSICDXPASTSURGICALHX_GEN_ALL_CORE_FT
PAST SURGICAL HISTORY:  History of prostatectomy     History of thyroidectomy     History of tonsillectomy

## 2021-05-29 NOTE — ED ADULT TRIAGE NOTE - CHIEF COMPLAINT QUOTE
Pt. to the ED BIBA from Home C/O 101 Fever- Pt. denies CP and SOB- Hx. of recent admission to Hospital for Pneumonia -- Hx. of Leukemia, GERD, CVA and Pneumonia -- Pt. reports to be fully vaccinated against Covid 19

## 2021-05-29 NOTE — ED ADULT NURSE NOTE - NS_SISCREENINGSR_GEN_ALL_ED
Denied today   Request Reference Number: [id number]. FENOFIBRATE TAB 160MG is denied for not meeting the prior authorization requirement(s). For further questions, call [address] [place] [address] at [phone] for more information. Appeals are not supported through [address] [place] [address]. Please refer to the fax case notice for appeals information and instructions.
PA SUBMITTED VIA Replaced by [facility] FOR Fenofibrate 160MG tablets  Key: [id number] - PA Case ID: [id number]   STATUS: PENDING
Negative

## 2021-05-29 NOTE — ED PROVIDER NOTE - CONSTITUTIONAL, MLM
normal... thin, chronically ill appearing, awake, alert, oriented to person, place, time/situation and in no apparent distress.

## 2021-05-29 NOTE — ED PROVIDER NOTE - OBJECTIVE STATEMENT
86 y/o male with PMHx of dementia, HTN, hypothyroidism, leukemia, GERD, CVA, PNA presents to the ED for fever tmax 100.4. Pt was recently admitted to the hospital for PNA and was discharged yesterday. Pt here with no complaints, his family brought him in today for fever. Pt is fully vaccinated for COVID. No other complaints at this time.

## 2021-05-30 NOTE — DIETITIAN INITIAL EVALUATION ADULT. - OTHER INFO
87 year old male patient who was recently admitted and discharged one day prior (05/28/2021) presented to the ED endorsing a fever since being discharged. Patient states his temperature was 100.4, Denies any increased coughing, chills, fatigue or weakness. Patient ambulates at baseline with a walker. No noted chest pain, shortness of breath, palpitation or diarrhea.    Pt seen for assessment, but know to RD service. Pt on last admission was seen on 05/20/21 and noted by RD: not eating x 1 wk  persistent lack of appetite 88yo male with PMH significant for hypothyroidism, hairy cell leukemia, BPH, GERD, and CVA p/w weakness.  Pt admitted with CAP, sepsis, lactic acidosis, leukocytosis and tachycardia.  RLL bacterial PNA. Rt sided weakness.  failure to thrive in adult/dysphagia, alcohol abuse.  Pt full code.severe malnutrition in acute on chronic illness r/t decreased PO intake 2/2 infection with CVA AEB severe muscle/fat wasting; meeting <50% of ENN x 1 wk    Pt's nutrition profile remains largely unchanged. PLEASE NOTE PT WAS ON DYS 2 HONEY THICK LAST ADMISSION; recommend SLP monitoring. Recommend Gelatein TID to help meet needs, MVI, Vitamin c 500mg BID, Vitamin d. Add appetite stimulant. Pt made wishes known no TF on last admission. Will continue to monitor pt's nutritional status

## 2021-05-30 NOTE — DIETITIAN NUTRITION RISK NOTIFICATION - TREATMENT: THE FOLLOWING DIET HAS BEEN RECOMMENDED
Diet, Dysphagia 2 Mechanical Soft-Honey Consistency Fluid:   Supplement Feeding Modality:  Oral  Ensure Enlive Servings Per Day:  1       Frequency:  Two Times a day (05-30-21 @ 12:58) [Active]

## 2021-05-30 NOTE — DIETITIAN INITIAL EVALUATION ADULT. - PERTINENT MEDS FT
MEDICATIONS  (STANDING):  enoxaparin Injectable 40 milliGRAM(s) SubCutaneous daily  folic acid 1 milliGRAM(s) Oral daily  levothyroxine 175 MICROGram(s) Oral daily  mirtazapine 7.5 milliGRAM(s) Oral daily  multivitamin 1 Tablet(s) Oral daily  piperacillin/tazobactam IVPB.. 3.375 Gram(s) IV Intermittent every 8 hours    MEDICATIONS  (PRN):  ondansetron Injectable 4 milliGRAM(s) IV Push every 6 hours PRN Nausea  traMADol 25 milliGRAM(s) Oral every 6 hours PRN Moderate Pain (4 - 6)

## 2021-05-30 NOTE — DIETITIAN INITIAL EVALUATION ADULT. - PHYSICAL ASSESSMENT DORSAL HAND
Left message for patient at      Telephone Information:   [facility] [phone]    to schedule procedure.  Patient to return call to Polina ELAM [phone].     severe

## 2021-05-30 NOTE — DIETITIAN NUTRITION RISK NOTIFICATION - ADDITIONAL COMMENTS/DIETITIAN RECOMMENDATIONS
-Encourage PO intake  -Mech Soft and honey thick diet (per last admission)  -Gelatein TID   -MVI daily  -Vitamin c 500mg BID  -Vitamin d daily  -Appetite stimulant

## 2021-05-30 NOTE — DIETITIAN INITIAL EVALUATION ADULT. - PERTINENT LABORATORY DATA
05-30    138  |  104  |  11  ----------------------------<  89  3.9   |  28  |  0.58    Ca    7.9<L>      30 May 2021 07:10  Phos  2.7     05-30  Mg     2.0     05-30    TPro  5.9<L>  /  Alb  1.8<L>  /  TBili  0.6  /  DBili  x   /  AST  26  /  ALT  22  /  AlkPhos  81  05-30    BMI: BMI (kg/m2): 16.1 (05-29-21 @ 22:09)  HbA1c: A1C with Estimated Average Glucose Result: 5.2 % (05-20-21 @ 07:48)    Glucose: POCT Blood Glucose.: 224 mg/dL (05-21-21 @ 21:33)    BP: 114/48 (05-30-21 @ 07:46) (114/48 - 164/71)  Lipid Panel:

## 2021-05-31 NOTE — SWALLOW BEDSIDE ASSESSMENT ADULT - ORAL PHASE
Bolus formation/transfer were mildly to moderately prolonged and discontinuous but felt to be mechanically functional with some of the above modified food consistencies. Piecemeal deglutition was evident. Mild+ tongue debris noted with mechanical soft solids.
(4) no impairment

## 2021-05-31 NOTE — PHYSICAL THERAPY INITIAL EVALUATION ADULT - GENERAL OBSERVATIONS, REHAB EVAL
pt received in bed on 5E. general muscle atrophy noted. no c/o pain at rest, c/o pain with R shld elevation and c/o R lateral thigh/hip pain with RLE movement, RN aware. pt cooperative with PT with encouragement.

## 2021-05-31 NOTE — SWALLOW BEDSIDE ASSESSMENT ADULT - SWALLOW EVAL: DIAGNOSIS
1) The pt demonstrates chronic Oropharyngeal Dysphagia which subjectively appeared to be a functional condition with a restricted inventory of modified food consistencies. Overt aspiration signs noted w/liquids less viscous than honey consistency. Also at high risk for saliva aspiration. Dysphagia is chronic/pre-existing/irreversible in setting of Dementia, muscle wasting, failure to thrive w/underlying leukemia, ETOH abuse, & prior non acute stroke.  Swallow potential is maximized.  2) The pt is alert. Affect is flat. He is communicatively passive, internally distractible & irritable at times. Pt able to verbalize during communicative probes without evidence of a primary speech-language pathology, albeit utterances were somewhat brief/vague/reflective of decreased memory-insight indicative of chronic Cognitive Dysfunction associated with underlying Dementia/ETOH abuse/old stroke. Pt received ST prior to hospitalization with poor progress.

## 2021-05-31 NOTE — SWALLOW BEDSIDE ASSESSMENT ADULT - ASR SWALLOW RECOMMEND DIAG
DEFER MBS AS PT APPEARED CLINICALLY TOLERANT OF SUGGESTED PO TEXTURES FROM AN OROPHARYNGEAL SWALLOWING STANCE, DYSPHAGIA WITH A PROPENSITY TO FLUCTUATE IN SEVERITY GIVEN PROFILE AND CONSIDERING THAT STIMULABILITY FOR EFFECTIVE USE OF COMPENSATORY SWALLOWING MANEUVERS WAS REDUCED.

## 2021-05-31 NOTE — PHYSICAL THERAPY INITIAL EVALUATION ADULT - DISCHARGE DISPOSITION, PT EVAL
Recommend continued skilled PT and MARY when medically stable for discharge./rehabilitation facility

## 2021-05-31 NOTE — PROGRESS NOTE ADULT - ASSESSMENT
87 year old male patient who was recently admitted and discharged for acute respiratory failure/ pneumonia      # Acute Respiratory failure/ Community Acquired Pneumonia  -pt recently admitted and treated for pna  -s/p rocephin/ zithromax  -will switch to zosyn  -currently not hypoxic  -monitor temps    # Leukocytosis  -likely reactive to above  -on zosyn  -trend cbc    # Anemia  -stable and at baseline    # Hypothyroidism  - on synthroid    # Hx of ETOH abuse  -on CIWA protocol    #Hairy Cell Leukemia  -pt completed treatment  -supportive care    #Protein calorie malnutrition  -get Nutritionist consult  5/30  - Speech path eval     # Advanced Directives  -pt still full code for now  -to be discharged home on home hospice.    # DVT ppx  -scds      DISPO - original plan was to dc home with pall care, but patient with generalized weakness, eating very little, will need to revisit DC plan   cont to monitor closely; f/u Speech path eval   discussed with RN 
87 year old male patient who was recently admitted and discharged for acute respiratory failure/ pneumonia      # Acute Respiratory failure  RLL pneumonia, suspected aspiration  -pt recently admitted and treated for pna  -Continue IV zosyn  -currently not hypoxic  -swallow eval  -Aspiration  precautions    # Leukocytosis  -likely reactive to above  -on zosyn  -trend cbc    # Anemia  -stable and at baseline    # Hypothyroidism  - on synthroid    # Hx of ETOH abuse  -on CIWA protocol    #Hairy Cell Leukemia  -pt completed treatment  -supportive care    #Severe Protein calorie malnutrition  -Nutritionist consult appreciated    # Advanced Directives  -pt still full code for now  -to be discharged home on home hospice.    # DVT ppx  -scds      DISPO - original plan was to dc home with pall care, but patient with generalized weakness, eating very little, will need to revisit DC plan   cont to monitor closely; f/u Speech path eval

## 2021-05-31 NOTE — SWALLOW BEDSIDE ASSESSMENT ADULT - SWALLOW EVAL: CRITERIA FOR SKILLED INTERVENTION MET
DO NOT FEEL THAT ACUTE SPEECH PATHOLOGY FOLLOW UP WOULD CHANGE CLINICAL MANAGEMENT/OUTCOME WHILE IN ACUTE HOSPITAL SETTING. PT'S DYSPHAGIA AND COGNITIVE DYSFUNCTION ARE CHRONIC PRE-EXISTING CONDITIONS FOR WHICH HE PREVIOUSLY RECEIVED SPEECH PATHOLOGY INTERVENTION PTH. COMMUNICATIVE AND SWALLOWING POTENTIAL ARE FELT TO BE MAXIMIZED. GIVEN ABOVE, WILL NOT ACTIVELY FOLLOW. RECONSULT PRN SHOULD STATUS CHANGE AND CONDITION WARRANT.

## 2021-05-31 NOTE — SWALLOW BEDSIDE ASSESSMENT ADULT - COMMENTS
The patient was admitted to . Hospital course is notable fever, leukocytosis, RLL pneumonia, hypoxia and low albumin, Note that pt was recently hospitalized at [facility] this month with lactic acidosis, sepsis, and right lower lobe pneumonia. Pt was seen by speech pathology during last hospitalization and was diagnosed with chronic Dysphagia which was felt to be a grossly functional condition with a restricted inventory of modified food textures. Pt was also diagnosed with concomitant Cognitive Dysfunction. Pt was followed for speech pathology intervention during last hospitalization and was eventually D/C from program due to reaching a progress plateau in therapy/maximizing therapy potential This profile is superimposed upon a selected prior history which is remarkable for Dementia, failure to thrive, Hairy Cell Leukemia, ETOH abuse, GERD, BPH s/p surgery, hypothyroidism s/p thyroidectomy NOS, previous CVA, and prior tonsillectomy.

## 2021-05-31 NOTE — SWALLOW BEDSIDE ASSESSMENT ADULT - ADDITIONAL RECOMMENDATIONS
1) NUTRITION F/U. PT AT NUTRITION RISK.     2) SUGGEST A PALLIATIVE CARE CONSULT. PT WITH CHRONIC DYSPHAGIA AND IS AT HIGH RISK FOR RECURRENT PNA/REHOSPITALIZATION.  NOTE THAT I DO NOT FEEL THAT PLACEMENT OF A PEG WOULD ENHANCE HIS LIFE QUALITY GIVEN HIS ADVANCED AGE/MULTIPLE COMORBIDITIES. FURTHER NOTE THAT PLACEMENT OF A FEEDING TUBE WOULD NOT ELIMINATE RISK FOR SALIVA ASPIRATION. IT SHOULD BE INDICATED THAT PT DOES APPEAR TO ENJOY ORAL FEEDING ON AN ALTERED TEXTURED DIET SO EATING BY MOUTH IS OFFERING A QUALITY OF LIFE BENEFIT.     3) KEEP HEAD OF BED AT SOMEWHAT OF AN UPRIGHT ANGLE WHEN LYING DOWN TO GUARD AGAINST SALIVA ASPIRATION.

## 2021-05-31 NOTE — SWALLOW BEDSIDE ASSESSMENT ADULT - NS SPL SWALLOW CLINIC TRIAL FT
Thin liquids and coarse solids not offered given pre-existing dysphagic profile. Odynophagia was denied.

## 2021-05-31 NOTE — SWALLOW BEDSIDE ASSESSMENT ADULT - SWALLOW EVAL: RECOMMENDED DIET
SUGGEST A DYSPHAGIA 2 DIET WITH HONEY THICK LIQUIDS AS THIS IS THE LEAST RESTRICTIVE TOLERABLE DIET CONSISTENCIES FROM AN OROPHARYNGEAL SWALLOWING PERSPECTIVE ON EXAM.

## 2021-05-31 NOTE — PHYSICAL THERAPY INITIAL EVALUATION ADULT - PERTINENT HX OF CURRENT PROBLEM, REHAB EVAL
87M who was recently admitted and discharged one day prior (05/28/2021) presented to the ED endorsing a fever since being discharged.

## 2021-05-31 NOTE — SWALLOW BEDSIDE ASSESSMENT ADULT - ORAL PREPARATORY PHASE
Pt tended to accept PO in small volumes but seemed to enjoy oral feeding. Labial grading on utensils was functional.

## 2021-05-31 NOTE — SWALLOW BEDSIDE ASSESSMENT ADULT - SLP GENERAL OBSERVATIONS
On encounter, a loss of bulk was evident in strap muscle regions. Pt is deconditioned in appearance. Pt was somewhat congested but not in overt respiratory distress.  The pt is alert. Affect is flat. He is communicatively passive, internally distractible & irritable at times. Pt able to verbalize during communicative probes without evidence of a primary speech-language pathology, albeit utterances were somewhat brief/vague/reflective of decreased memory-insight indicative of chronic Cognitive Dysfunction associated with underlying Dementia/ETOH abuse/old stroke. Pt received ST prior to hospitalization with poor progress.

## 2021-06-01 NOTE — PROVIDER CONTACT NOTE (OTHER) - SITUATION
notified Dr Ramírez's office of admission please fax over d/c paperwork to [phone] once pt is dc

## 2021-06-02 NOTE — PROGRESS NOTE ADULT - REASON FOR ADMISSION
Acute Respiratory failure  Pneumonia

## 2021-06-02 NOTE — PROGRESS NOTE ADULT - NUTRITIONAL ASSESSMENT
This patient has been assessed with a concern for Malnutrition and has been determined to have a diagnosis/diagnoses of Severe protein-calorie malnutrition and Underweight (BMI < 19).    This patient is being managed with:   Diet Dysphagia 2 Mechanical Soft-Honey Consistency Fluid-  Supplement Feeding Modality:  Oral  Ensure Enlive Servings Per Day:  1       Frequency:  Two Times a day  Entered: May 30 2021 12:58PM    

## 2021-06-02 NOTE — PROGRESS NOTE ADULT - SUBJECTIVE AND OBJECTIVE BOX
87 year old male patient who was recently admitted and discharged one day prior (05/28/2021) presented to the ED endorsing a fever since being discharged. Patient states his temperature was 100.4, Denies any increased coughing, chills, fatigue or weakness. Patient ambulates at baseline with a walker. No noted chest pain, shortness of breath, palpitation or diarrhea.  In the ED patient with WBC of 11.45( was 8.22 on discharge)- RLL infiltrate seen on CXR.    5/30 - no cp palps sob, looks very weak  05/31/21: Patient seen and examined in am. Lying in bed having breakfast. Weak and cachectic looking. He denies any new complaints.         Vital Signs Last 24 Hrs  T(C): 36.7 (31 May 2021 07:45), Max: 36.7 (30 May 2021 19:24)  T(F): 98 (31 May 2021 07:45), Max: 98.1 (30 May 2021 19:24)  HR: 84 (31 May 2021 07:45) (84 - 100)  BP: 127/49 (31 May 2021 07:45) (116/51 - 149/53)  BP(mean): --  RR: 18 (31 May 2021 07:45) (18 - 18)  SpO2: 96% (31 May 2021 07:45) (92% - 96%)      PHYSICAL EXAM:    GENERAL: NAD, able to lie flat in bed, very weak, cachectic   HEAD:  Atraumatic, Normocephalic  EYES: EOMI, PERRLA, normal sclera  ENT: Moist mucous membranes  NECK: Supple, No JVD, no nuchal rigidity  CHEST/LUNG: Clear to auscultation bilaterally; No rales, rhonchi, wheezing, or rubs. Unlabored respirations  HEART: Regular rate and rhythm; No murmurs, rubs, or gallops  ABDOMEN: Bowel sounds present; Soft, Nontender, Nondistended. No hepatomegaly  EXTREMITIES:  no pitting bilaterally  NERVOUS SYSTEM:  Alert & Oriented X3, speech clear. No focal motor or sensory deficits  MSK: FROM all 4 extremities, full and equal strength  SKIN: No rashes or lesions        LABS: All Labs Reviewed:                                       9.0    11.19 )-----------( 292      ( 31 May 2021 08:28 )             27.6     31 May 2021 08:28    139    |  104    |  12     ----------------------------<  88     3.7     |  27     |  0.59     Ca    8.3        31 May 2021 08:28  Phos  2.7       30 May 2021 07:10  Mg     2.2       31 May 2021 08:28    TPro  5.9    /  Alb  1.8    /  TBili  0.6    /  DBili  x      /  AST  26     /  ALT  22     /  AlkPhos  81     30 May 2021 07:10    LIVER FUNCTIONS - ( 30 May 2021 07:10 )  Alb: 1.8 g/dL / Pro: 5.9 gm/dL / ALK PHOS: 81 U/L / ALT: 22 U/L / AST: 26 U/L / GGT: x                   MEDICATIONS  (STANDING):  enoxaparin Injectable 40 milliGRAM(s) SubCutaneous daily  folic acid 1 milliGRAM(s) Oral daily  levothyroxine 175 MICROGram(s) Oral daily  mirtazapine 7.5 milliGRAM(s) Oral daily  multivitamin 1 Tablet(s) Oral daily  piperacillin/tazobactam IVPB.. 3.375 Gram(s) IV Intermittent every 8 hours    MEDICATIONS  (PRN):  ondansetron Injectable 4 milliGRAM(s) IV Push every 6 hours PRN Nausea  traMADol 25 milliGRAM(s) Oral every 6 hours PRN Moderate Pain (4 - 6)        
CC:   Subjective and Objective:   87 year old male patient who was recently admitted and discharged one day prior (05/28/2021) presented to the ED endorsing a fever since being discharged. Patient states his temperature was 100.4, Denies any increased coughing, chills, fatigue or weakness. Patient ambulates at baseline with a walker. No noted chest pain, shortness of breath, palpitation or diarrhea.  In the ED patient with WBC of 11.45( was 8.22 on discharge)- RLL infiltrate seen on CXR.    5/30 - no cp palps sob, looks very weak  05/31/21: Patient seen and examined in am. Lying in bed having breakfast. Weak and cachectic looking. He denies any new complaints.   6/1:  No specific complaints, does have knee pain, requesting pain meds. No fever, chills, n, v.    REVIEW OF SYSTEMS: All other review of systems is negative unless indicated above.    Vital Signs Last 24 Hrs  T(C): 36.5 (01 Jun 2021 08:19), Max: 36.7 (31 May 2021 19:44)  T(F): 97.7 (01 Jun 2021 08:19), Max: 98.1 (31 May 2021 19:44)  HR: 83 (01 Jun 2021 08:19) (83 - 104)  BP: 120/49 (01 Jun 2021 08:19) (110/45 - 120/49)  BP(mean): --  RR: 18 (01 Jun 2021 08:19) (17 - 18)  SpO2: 97% (01 Jun 2021 08:19) (95% - 97%)      PHYSICAL EXAM:    GENERAL: NAD, weak and frail appearing, cachectic   HEAD:  Atraumatic, Normocephalic  EYES: EOMI, PERRLA, normal sclera  ENT: Moist mucous membranes  NECK: Supple, No JVD, no nuchal rigidity  CHEST/LUNG: Clear to auscultation bilaterally; No rales, rhonchi, wheezing, or rubs. Unlabored respirations  HEART: Regular rate and rhythm; No murmurs, rubs, or gallops  ABDOMEN: Bowel sounds present; Soft, Nontender, Nondistended. No hepatomegaly  EXTREMITIES:  no pitting bilaterally  NERVOUS SYSTEM:  Alert & Oriented X3, speech clear. No focal motor or sensory deficits  SKIN: No rashes or lesions    MEDICATIONS  (STANDING):  acetaminophen   Tablet .. 650 milliGRAM(s) Oral every 6 hours  enoxaparin Injectable 40 milliGRAM(s) SubCutaneous daily  folic acid 1 milliGRAM(s) Oral daily  levothyroxine 175 MICROGram(s) Oral daily  mirtazapine 7.5 milliGRAM(s) Oral daily  multivitamin 1 Tablet(s) Oral daily  piperacillin/tazobactam IVPB.. 3.375 Gram(s) IV Intermittent every 8 hours    MEDICATIONS  (PRN):  ondansetron Injectable 4 milliGRAM(s) IV Push every 6 hours PRN Nausea  traMADol 25 milliGRAM(s) Oral every 6 hours PRN Moderate Pain (4 - 6)                            8.7    9.74  )-----------( 271      ( 01 Jun 2021 07:52 )             26.5     05-31    139  |  104  |  12  ----------------------------<  88  3.7   |  27  |  0.59    Ca    8.3<L>      31 May 2021 08:28  Mg     2.2     05-31      CAPILLARY BLOOD GLUCOSE          Assessment and Plan:  87 year old male patient who was recently admitted and discharged for acute respiratory failure/ pneumonia      # Acute Respiratory failure:  -respiratory failure resolved, satting well on room air  RLL pneumonia, suspected aspiration  -pt recently admitted and treated for pna  -Continue IV zosyn day # 3  -swallow eval --> dys 2 w/ honey, tolerating   -Aspiration  precautions    # Anemia  -stable and at baseline    # Hypothyroidism  - on synthroid    # Hx of ETOH abuse  -on [facility] protocol    #Hairy Cell Leukemia  -pt completed treatment  -supportive care    #Severe Protein calorie malnutrition  -Nutritionist consult appreciated    # Advanced Directives  -pt still full code for now  -to be discharged home on home hospice.    # DVT ppx  -scds      DISPO:  - original plan was to dc home with pall care, but patient with generalized weakness, eating very little, will need to revisit DC plan   cont to monitor closely    
CC:   Subjective and Objective:   87 year old male patient who was recently admitted and discharged one day prior (05/28/2021) presented to the ED endorsing a fever since being discharged. Patient states his temperature was 100.4, Denies any increased coughing, chills, fatigue or weakness. Patient ambulates at baseline with a walker. No noted chest pain, shortness of breath, palpitation or diarrhea.  In the ED patient with WBC of 11.45( was 8.22 on discharge)- RLL infiltrate seen on CXR.    5/30 - no cp palps sob, looks very weak  05/31/21: Patient seen and examined in am. Lying in bed having breakfast. Weak and cachectic looking. He denies any new complaints.   6/1:  No specific complaints, does have knee pain, requesting pain meds. No fever, chills, n, v.  6/2: In bed, alert, no complaints.  Afebrile. Tolerating antibiotics.     REVIEW OF SYSTEMS: All other review of systems is negative unless indicated above.    Vital Signs Last 24 Hrs  T(C): 36.6 (02 Jun 2021 08:29), Max: 36.6 (02 Jun 2021 08:29)  T(F): 97.8 (02 Jun 2021 08:29), Max: 97.8 (02 Jun 2021 08:29)  HR: 72 (02 Jun 2021 08:29) (72 - 89)  BP: 106/44 (02 Jun 2021 08:29) (102/60 - 146/55)  BP(mean): --  RR: 18 (02 Jun 2021 08:29) (18 - 18)  SpO2: 98% (02 Jun 2021 08:29) (96% - 98%)    PHYSICAL EXAM:    GENERAL: NAD, weak and frail appearing, cachectic   HEAD:  Atraumatic, Normocephalic  EYES: EOMI, PERRLA, normal sclera  ENT: Moist mucous membranes  NECK: Supple, No JVD, no nuchal rigidity  CHEST/LUNG: Clear to auscultation bilaterally; No rales, rhonchi, wheezing, or rubs. Unlabored respirations  HEART: Regular rate and rhythm; No murmurs, rubs, or gallops  ABDOMEN: Bowel sounds present; Soft, Nontender, Nondistended. No hepatomegaly  EXTREMITIES:  no pitting bilaterally  NERVOUS SYSTEM:  Alert & Oriented X3, speech clear. No focal motor or sensory deficits  SKIN: No rashes or lesions    MEDICATIONS  (STANDING):  acetaminophen   Tablet .. 650 milliGRAM(s) Oral every 6 hours  enoxaparin Injectable 40 milliGRAM(s) SubCutaneous daily  folic acid 1 milliGRAM(s) Oral daily  levothyroxine 175 MICROGram(s) Oral daily  mirtazapine 7.5 milliGRAM(s) Oral daily  multivitamin 1 Tablet(s) Oral daily  piperacillin/tazobactam IVPB.. 3.375 Gram(s) IV Intermittent every 8 hours    MEDICATIONS  (PRN):  ondansetron Injectable 4 milliGRAM(s) IV Push every 6 hours PRN Nausea  traMADol 25 milliGRAM(s) Oral every 6 hours PRN Moderate Pain (4 - 6)                                8.8    9.46  )-----------( 243      ( 02 Jun 2021 07:47 )             27.5           CAPILLARY BLOOD GLUCOSE          Assessment and Plan:  87 year old male patient who was recently admitted and discharged for acute respiratory failure/ pneumonia      # Acute Respiratory failure:  -respiratory failure resolved, satting well on room air  RLL pneumonia, suspected aspiration  -pt recently admitted and treated for pna  -Continue IV zosyn day # 4  -swallow eval --> dys 2 w/ honey, tolerating   -Aspiration  precautions    # Anemia  -stable and at baseline    # Hypothyroidism  - on synthroid    # Hx of ETOH abuse  -on [facility] protocol    #Hairy Cell Leukemia  -pt completed treatment  -supportive care    #Severe Protein calorie malnutrition  -Nutritionist consult appreciated    # Advanced Directives  -pt still full code for now  -to be discharged home on home hospice.    # DVT ppx  -scds      DISPO:  - likely home with palliative once medically stable. I reached out to family, unable to get in touch today.         Assessment and Plan:   Nutritional Assessment:  · Nutritional Assessment	This patient has been assessed with a concern for Malnutrition and has been determined to have a diagnosis/diagnoses of Severe protein-calorie malnutrition and Underweight (BMI < 19).    This patient is being managed with:   Diet Dysphagia 2 Mechanical Soft-Honey Consistency Fluid-  Supplement Feeding Modality:  Oral  Ensure Enlive Servings Per Day:  1       Frequency:  Two Times a day  Entered: May 30 2021 12:58PM    
87 year old male patient who was recently admitted and discharged one day prior (05/28/2021) presented to the ED endorsing a fever since being discharged. Patient states his temperature was 100.4, Denies any increased coughing, chills, fatigue or weakness. Patient ambulates at baseline with a walker. No noted chest pain, shortness of breath, palpitation or diarrhea.  In the ED patient with WBC of 11.45( was 8.22 on discharge)- RLL infiltrate seen on CXR.    5/30 - no cp palps sob, looks very weak    PHYSICAL EXAM:  Vital Signs Last 24 Hrs  T(C): 36.3 (30 May 2021 15:18), Max: 36.8 (29 May 2021 20:13)  T(F): 97.4 (30 May 2021 15:18), Max: 98.2 (29 May 2021 20:13)  HR: 100 (30 May 2021 15:18) (87 - 100)  BP: 116/51 (30 May 2021 15:18) (114/48 - 164/71)  BP(mean): 68 (29 May 2021 20:13) (68 - 68)  RR: 18 (30 May 2021 15:18) (16 - 18)  SpO2: 92% (30 May 2021 15:18) (92% - 96%)  GENERAL: NAD, able to lie flat in bed, very weak, cachexic   HEAD:  Atraumatic, Normocephalic  EYES: EOMI, PERRLA, normal sclera  ENT: Moist mucous membranes  NECK: Supple, No JVD, no nuchal rigidity  CHEST/LUNG: Clear to auscultation bilaterally; No rales, rhonchi, wheezing, or rubs. Unlabored respirations  HEART: Regular rate and rhythm; No murmurs, rubs, or gallops  ABDOMEN: Bowel sounds present; Soft, Nontender, Nondistended. No hepatomegaly  EXTREMITIES:  no pitting bilaterally  NERVOUS SYSTEM:  Alert & Oriented X3, speech clear. No focal motor or sensory deficits  MSK: FROM all 4 extremities, full and equal strength  SKIN: No rashes or lesions    LABS: All Labs Reviewed:                        9.4    10.83 )-----------( 310      ( 30 May 2021 07:10 )             29.3     138  |  104  |  11  ----------------------------<  89  3.9   |  28  |  0.58  Ca    7.9<L>      30 May 2021 07:10  Phos  2.7     05-30  Mg     2.0     05-30  TPro  5.9<L>  /  Alb  1.8<L>  /  TBili  0.6  /  DBili  x   /  AST  26  /  ALT  22  /  AlkPhos  81  05-30    RADIOLOGY/EKG:  < from: US Abdomen Upper Quadrant Right (05.19.21 @ 21:37) >  1. No sonographic evidence of cirrhosis.  2. Small pancreatic head/neck cystic lesions.  3. Right pleural effusion.  < from: Xray Chest 1 View- PORTABLE-Routine (Xray Chest 1 View- PORTABLE-Routine .) (05.29.21 @ 18:59) >    IMPRESSION:  No significant change in right basilar infiltrate. There is a new small right parapneumonic effusion. No other interval change      enoxaparin Injectable 40 milliGRAM(s) SubCutaneous daily  folic acid 1 milliGRAM(s) Oral daily  levothyroxine 175 MICROGram(s) Oral daily  mirtazapine 7.5 milliGRAM(s) Oral daily  multivitamin 1 Tablet(s) Oral daily  ondansetron Injectable 4 milliGRAM(s) IV Push every 6 hours PRN  piperacillin/tazobactam IVPB.. 3.375 Gram(s) IV Intermittent every 8 hours  traMADol 25 milliGRAM(s) Oral every 6 hours PRN

## 2021-06-03 NOTE — CONSULT NOTE ADULT - ASSESSMENT
HPI: Pt is a 87y old Male recently admitted and discharged one day prior (05/28/2021) presented to the ED endorsing a fever since being discharged. Patient states his temperature was 100.4, Denies any increased coughing, chills, fatigue or weakness. Patient ambulates at baseline with a walker. In the ED patient with WBC of 11.45( was 8.22 on discharge)- RLL infiltrate seen on CXR. Palliative Medicine consult to re eval GOC as pt was seen by our team last week.  6/3/21 Seen and examined at bedside with no family present. Notes he is in the hospital however unable to provide hx. Denies pain or dyspnea at present.   Assessment and Plan:      1) Community-acquired bacterial pneumonia  -CXR 5/29=No significant change in right basilar infiltrate. There is a new small right parapneumonic effusion. No other interval change  -With persistent RLL bacterial pneumonia  -c/w IV antibiotics - F/u blood cx  -possible aspiration   -speech and swallow consult appreciated   -supplemental O2 PRN    2) Hairy Cell Leukemia  - Patient was diagnosed with hairy cell leukemia in 2012   - oncology consult appreciate   - to monitor H/H   - Status post cladribine therapy twice  - as per oncology patient currently not on chemotherapy does not Have any indication for retreatment    3) Severe protein calorie malnutrition   - patient severely cachetic   - Albumin, Serum: 1.8  - patient states that he just has no appetite anymore   -+ muscle wasting  -Swallow eval noted     4) Debility   - PPSV2: 40  %  - PT consult appreciated    - falls risk   - supportive care     5) Advance care planning   - limited capacity secondary to forgetfulness and [place]  - patient alert and oriented but has limited insight and intermittent confusion on his medical history   - patient lives at home with his wife ( who has advance dementia) and 24 HHA   - daughter lives in [place] and helps patient make medical decisions  - GOC discussion with daughter May 26 no limits set   -Will call daughter to discuss GOC further

## 2021-06-03 NOTE — DISCHARGE NOTE PROVIDER - NSDCCPCAREPLAN_GEN_ALL_CORE_FT
PRINCIPAL DISCHARGE DIAGNOSIS  Diagnosis: Pneumonia  Assessment and Plan of Treatment: Due to aspirationi  Adhere to dysphagia 2 diet with honey thickened liquid  take augmentin until 6/6  follow up with pcp 1 week

## 2021-06-03 NOTE — DISCHARGE NOTE NURSING/CASE MANAGEMENT/SOCIAL WORK - PATIENT PORTAL LINK FT
You can access the FollowMyHealth Patient Portal offered by [facility] by registering at the following website: [email or web address]. By joining [facility]’s FollowMyHealth portal, you will also be able to view your health information using other applications (apps) compatible with our system.

## 2021-06-03 NOTE — CONSULT NOTE ADULT - SUBJECTIVE AND OBJECTIVE BOX
HPI: Pt is a 87y old Male recently admitted and discharged one day prior (05/28/2021) presented to the ED endorsing a fever since being discharged. Patient states his temperature was 100.4, Denies any increased coughing, chills, fatigue or weakness. Patient ambulates at baseline with a walker. In the ED patient with WBC of 11.45( was 8.22 on discharge)- RLL infiltrate seen on CXR. Palliative Medicine consult to re ino [facility] as pt was seen by our team last week.  6/3/21 Seen and examined at bedside with no family present. Notes he is in the hospital however unable to provide hx. Denies pain or dyspnea at present.     PAIN: ( )Yes   (X )No  Denies  As per record C/O knee pain takes Tramadol PRN  DYSPNEA: ( ) Yes  ( X) No      PAST MEDICAL & SURGICAL HISTORY:  Hypothyroidism  s/p thyroidectomy  Hairy cell leukemia  GERD (gastroesophageal reflux disease)  BPH (benign prostatic hyperplasia)  Alcohol abuse  per daughter  Cerebrovascular accident (CVA)  per daughter, in 2019  History of tonsillectomy  History of thyroidectomy  History of prostatectomy      SOCIAL HX:  Lives home with aide support and wife  Hx opiate tolerance ( )YES  (X )NO    Baseline ADLs  (Prior to Admission)  ( ) Independent   (X )Dependent    FAMILY HISTORY:  Family history of cancer of genitourinary system (Sibling)        Review of Systems:      Unable to obtain/Limited due to: poor historian      PHYSICAL EXAM:    Vital Signs Last 24 Hrs  T(C): 36.7 (03 Jun 2021 07:19), Max: 36.7 (03 Jun 2021 07:19)  T(F): 98 (03 Jun 2021 07:19), Max: 98 (03 Jun 2021 07:19)  HR: 84 (03 Jun 2021 07:19) (76 - 84)  BP: 154/55 (03 Jun 2021 07:19) (104/44 - 154/55)  RR: 18 (03 Jun 2021 07:19) (18 - 18)  SpO2: 97% (03 Jun 2021 07:19) (96% - 97%)        PPSV2:  40 %    General: Frail elderly male in bed in NAD  Mental Status: A&O X2/confused  HEENT: oral mucosa dry  Lungs: clear diminished mary  Cardiac: S1S2+  GI: abd soft NT ND + BS  : voids  Ext: LE on bed  Neuro: confusion at times      LABS:                        8.8    9.46  )-----------( 243      ( 02 Jun 2021 07:47 )             27.5             Albumin: Albumin, Serum: 1.8 g/dL (05-30 @ 07:10)      Allergies    No Known Allergies    Intolerances      MEDICATIONS  (STANDING):  acetaminophen   Tablet .. 650 milliGRAM(s) Oral every 6 hours  enoxaparin Injectable 40 milliGRAM(s) SubCutaneous daily  folic acid 1 milliGRAM(s) Oral daily  levothyroxine 175 MICROGram(s) Oral daily  mirtazapine 7.5 milliGRAM(s) Oral daily  multivitamin 1 Tablet(s) Oral daily  piperacillin/tazobactam IVPB.. 3.375 Gram(s) IV Intermittent every 8 hours    MEDICATIONS  (PRN):  ondansetron Injectable 4 milliGRAM(s) IV Push every 6 hours PRN Nausea  traMADol 25 milliGRAM(s) Oral every 6 hours PRN Moderate Pain (4 - 6)      RADIOLOGY/ADDITIONAL STUDIES:  < from: Xray Chest 1 View- PORTABLE-Routine (Xray Chest 1 View- PORTABLE-Routine .) (05.29.21 @ 18:59) >    EXAM:  XR CHEST PORTABLE ROUTINE 1V                            PROCEDURE DATE:  05/29/2021          INTERPRETATION:  Follow-up pneumonia.    AP chest. Prior 5/19/2021.    IMPRESSION:  No significant change in right basilar infiltrate. There is a new small right parapneumonic effusion. No other interval change

## 2021-06-03 NOTE — DISCHARGE NOTE PROVIDER - NSDCMRMEDTOKEN_GEN_ALL_CORE_FT
acetaminophen 325 mg oral tablet: 2 tab(s) orally every 6 hours  amoxicillin-clavulanate 875 mg-125 mg oral tablet: 1 milligram(s) orally 2 times a day until 6/6  folic acid 1 mg oral tablet: 1 tab(s) orally once a day  levothyroxine 175 mcg (0.175 mg) oral tablet: 1 tab(s) orally once a day  mirtazapine 7.5 mg oral tablet: 1 tab(s) orally once a day  Multiple Vitamins oral tablet: 1 tab(s) orally once a day  traMADol 50 mg oral tablet: 0.5 tab(s) orally every 6 hours, As needed, Moderate Pain (4 - 6)

## 2021-06-03 NOTE — DISCHARGE NOTE PROVIDER - DETAILS OF MALNUTRITION DIAGNOSIS/DIAGNOSES
This patient has been assessed with a concern for Malnutrition and was treated during this hospitalization for the following Nutrition diagnosis/diagnoses:     -  05/30/2021: Severe protein-calorie malnutrition   -  05/30/2021: Underweight (BMI < 19)

## 2021-06-03 NOTE — DISCHARGE NOTE PROVIDER - HOSPITAL COURSE
CC: Fever  HPI:  87 year old male patient who was recently admitted and discharged one day prior (05/28/2021) presented to the ED endorsing a fever since being discharged. Patient states his temperature was 100.4, Denies any increased coughing, chills, fatigue or weakness. Patient ambulates at baseline with a walker. No noted chest pain, shortness of breath, palpitation or diarrhea.  In the ED patient with WBC of 11.45( was 8.22 on discharge)- RLL infiltrate seen on CXR.    Hospital course:  Pt presented with acute respiratory failure due to RLL pneumonia, suspected aspiration in etiology.  Pt given 5 day course of zosyn with improvement.  Pt off supplemental O2.  Pt now on dys 2 w/ honey thickened liquid diet per speech/swallow evaluation.  Pt is HD stable and ready for discharge to rehab.      REVIEW OF SYSTEMS: All other review of systems is negative unless indicated above.    Vital Signs Last 24 Hrs  T(C): 36.7 (03 Jun 2021 07:19), Max: 36.7 (03 Jun 2021 07:19)  T(F): 98 (03 Jun 2021 07:19), Max: 98 (03 Jun 2021 07:19)  HR: 84 (03 Jun 2021 07:19) (76 - 84)  BP: 154/55 (03 Jun 2021 07:19) (104/44 - 154/55)  BP(mean): --  RR: 18 (03 Jun 2021 07:19) (18 - 18)  SpO2: 97% (03 Jun 2021 07:19) (96% - 97%)      PHYSICAL EXAM:    GENERAL: NAD, weak and frail appearing, cachectic   HEAD:  Atraumatic, Normocephalic  EYES: EOMI, PERRLA, normal sclera  ENT: Moist mucous membranes  NECK: Supple, No JVD, no nuchal rigidity  CHEST/LUNG: Clear to auscultation bilaterally; No rales, rhonchi, wheezing, or rubs. Unlabored respirations  HEART: Regular rate and rhythm; No murmurs, rubs, or gallops  ABDOMEN: Bowel sounds present; Soft, Nontender, Nondistended. No hepatomegaly  EXTREMITIES:  no pitting bilaterally  NERVOUS SYSTEM:  Alert & Oriented X3, speech clear. No focal motor or sensory deficits  SKIN: No rashes or lesions    med/labs: Reviewed and interpreted           Assessment and Plan:  87 year old male patient who was recently admitted and discharged for acute respiratory failure/ pneumonia      # Acute Respiratory failure:  -respiratory failure resolved, satting well on room air  RLL pneumonia, suspected aspiration  -pt recently admitted and treated for pna  -Continue IV zosyn day # 5.  discharge on augmentin to complete 7 day course.   -swallow eval --> dys 2 w/ honey, tolerating   -Aspiration  precautions    # Anemia  -stable and at baseline    # Hypothyroidism  - on synthroid    # Hx of ETOH abuse      #Hairy Cell Leukemia  -pt completed treatment  -supportive care    #Severe Protein calorie malnutrition  -Nutritionist consult appreciated    # Advanced Directives  -FC    # DVT ppx  -scds      DISPO:  -Discharge to [facility]    Attending Statement: 40 minutes spent on total encounter and discharge planning.

## 2021-09-09 NOTE — PATIENT PROFILE ADULT - PRIMARY SOURCE OF SUPPORT/COMFORT
[facility]  [facility] [address] [address]     September 9, 2021    Patient: Radha Chavez   YOB: 1992   Date of Visit: 9/9/2021       To Whom It May Concern:    Radha Chavez was seen and treated in our department on 9/9/2021.  Please excuse from work on 9/10/2021 through 9/12/2021.  Currently being treated for allergic contact dermatitis.  Call with questions or concerns at [phone].    Sincerely,     Evans Topete P.A.-C.                
spouse

## 2022-01-11 NOTE — PATIENT PROFILE ADULT - NSSCCAGEEYEOPENER_GEN_A_NUR
Pt sent online request for new pt appt, I called back but she did not answer I left her our contact info when she has a minute to schedule   conor
no

## 2022-02-21 NOTE — ED PROVIDER NOTE - NS_ATTENDINGSCRIBE_ED_ALL_ED
I personally performed the service described in the documentation recorded by the scribe in my presence, and it accurately and completely records my words and actions. Home

## 2022-06-03 NOTE — ED ADULT NURSE NOTE - NSSISCREENINGQ1_ED_A_ED
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

## 2022-07-24 NOTE — ED PROVIDER NOTE - NS ED MD DISPO SPECIAL CONSIDERATION1
· BP borderline in the ED  · Metoprolol 100 mg q6  · Would eventually restart Lisinopril when Na improves for GMDT   · Amlodipine, Lisinopril dc'd None

## 2022-11-26 NOTE — DIETITIAN INITIAL EVALUATION ADULT. - NAME AND PHONE
Pt admitted from 10/19 to 10/22/2022 at [facility] for SDHs/p adnexa. Off of AC.   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  Mild mass effect with sulcal effacement and partial compression of the  left lateral ventricle. No new acute intracranial hemorrhage.  - Neuro Dr. Main, recs appreciated Pt admitted from 10/19 to 10/22/2022 at [facility] for SDH s/p s/p andexxa  Off of AC.   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  Mild mass effect with sulcal effacement and partial compression of the  left lateral ventricle. No new acute intracranial hemorrhage.  - Neuro Dr. Main, recs appreciated  Plan for Transfer to [facility] for further Eval & EEG Jenna Cantrell RD, [phone] (pager) [phone] (office)

## 2022-12-01 NOTE — PHYSICAL THERAPY INITIAL EVALUATION ADULT - REHAB POTENTIAL, PT EVAL
Detail Level: Detailed
Quality 130: Documentation Of Current Medications In The Medical Record: Current Medications Documented
Quality 110: Preventive Care And Screening: Influenza Immunization: Influenza Immunization Administered during Influenza season
fair, will monitor progress closely

## 2023-10-28 NOTE — ED PROVIDER NOTE - MEDICAL DECISION MAKING DETAILS
[[id number]] 85 y/o M c/o worsening rectal pain with constipation over the past x3 days with plans to receive rectal exam [[id number]],[[id number]] 83 y/o M c/o worsening rectal pain with constipation over the past x3 days + fecal impaction. will disimpact and give enema. dc home with laxative

## 2023-11-15 NOTE — ED PROVIDER NOTE - CROS ED CARDIOVAS ALL NEG
Kate Salinas  Surgery  [address] [place] [address]  Phone: [phone]  Fax: [phone]  Follow Up Time: 2 weeks  
negative...

## 2025-03-24 NOTE — ED ADULT NURSE NOTE - SUICIDE SCREENING QUESTION 3
Now resolved  Instructed to notify clinic if symptoms return.  
Well-controlled in office today   Continue losartan 25 mg daily and amlodipine 10 mg daily   Encouraged low-sodium diet   Continue to monitor blood pressure at home and report readings consistently above 140/90.  
No

## 2025-06-09 NOTE — PROVIDER CONTACT NOTE (OTHER) - DATE AND TIME:
home is asking if you would be willing to sign the death cert. Notes are in care everywhere from [facility] 
19-May-2021 22:05
Copied from CRM #[id number]. Topic: [facility] Administrative -  Medical Records  >> Dru 3, 2025  2:13 PM Ryanne BHANDARI wrote:  Niranjan Paige called regarding notification of a  patient.    > Input ' patient' in the comments  >Sent to clinician AND THONY SHAIKH  PATIENT NOTIFICATION [[id number]] pools--DO NOT REPLY - Sent from PACT - If sent to wrong pool, reroute to P ECO Reroute pool --     Patient Notification    Patient Name: Niranjan Paige  Patient MRN: [id number]  Patient YOB: 1979  Patient Address: [address] [place] [address]  Actual Date of Death (MM/DD/YYYY): 2025    Individual Sharing the Information:   [place]  home (Other) [phone]     Relationship to Patient:  Home  Individual's Phone Number: [phone]   Method of Notification: Call Received    Send/Route To: THONY SHAIKH  PATIENT NOTIFICATION      
Dr. Thakur:  I called [facility] in [facility] and spoke with med records staff. I was told the ER  doctors do nto sign death certificates because they are not the PCP. PCP needs to sign the death certificate.  
Spoke with Sue from  home she states she did send a request to [facility] in [place] regarding this and is waiting for a response .   
Thank you.    
20-May-2021 08:55
28-May-2021 11:05